# Patient Record
Sex: FEMALE | Race: WHITE | Employment: OTHER | URBAN - METROPOLITAN AREA
[De-identification: names, ages, dates, MRNs, and addresses within clinical notes are randomized per-mention and may not be internally consistent; named-entity substitution may affect disease eponyms.]

---

## 2017-02-28 ENCOUNTER — HOSPITAL ENCOUNTER (EMERGENCY)
Age: 37
Discharge: HOME | End: 2017-02-28
Attending: EMERGENCY MEDICINE | Admitting: EMERGENCY MEDICINE
Payer: MEDICAID

## 2017-02-28 VITALS
BODY MASS INDEX: 32.89 KG/M2 | DIASTOLIC BLOOD PRESSURE: 73 MMHG | HEART RATE: 81 BPM | HEIGHT: 63 IN | SYSTOLIC BLOOD PRESSURE: 104 MMHG | TEMPERATURE: 98.4 F | WEIGHT: 185.63 LBS | OXYGEN SATURATION: 100 % | RESPIRATION RATE: 18 BRPM

## 2017-02-28 DIAGNOSIS — L02.214 ABSCESS OF GROIN, LEFT: Primary | ICD-10-CM

## 2017-02-28 LAB — HCG UR QL: NEGATIVE

## 2017-02-28 PROCEDURE — 74011250637 HC RX REV CODE- 250/637: Performed by: PHYSICIAN ASSISTANT

## 2017-02-28 PROCEDURE — 74011000250 HC RX REV CODE- 250: Performed by: PHYSICIAN ASSISTANT

## 2017-02-28 PROCEDURE — 77030019895 HC PCKNG STRP IODO -A

## 2017-02-28 PROCEDURE — 81025 URINE PREGNANCY TEST: CPT

## 2017-02-28 PROCEDURE — 75810000289 HC I&D ABSCESS SIMP/COMP/MULT

## 2017-02-28 PROCEDURE — 99283 EMERGENCY DEPT VISIT LOW MDM: CPT

## 2017-02-28 RX ORDER — LIDOCAINE HYDROCHLORIDE AND EPINEPHRINE 10; 10 MG/ML; UG/ML
1.5 INJECTION, SOLUTION INFILTRATION; PERINEURAL
Status: COMPLETED | OUTPATIENT
Start: 2017-02-28 | End: 2017-02-28

## 2017-02-28 RX ORDER — CLINDAMYCIN HYDROCHLORIDE 150 MG/1
300 CAPSULE ORAL 4 TIMES DAILY
Qty: 56 CAP | Refills: 0 | Status: SHIPPED | OUTPATIENT
Start: 2017-02-28 | End: 2017-03-07

## 2017-02-28 RX ORDER — IBUPROFEN 800 MG/1
800 TABLET ORAL
Qty: 20 TAB | Refills: 0 | Status: SHIPPED | OUTPATIENT
Start: 2017-02-28 | End: 2017-03-07

## 2017-02-28 RX ORDER — CLINDAMYCIN HYDROCHLORIDE 150 MG/1
300 CAPSULE ORAL
Status: COMPLETED | OUTPATIENT
Start: 2017-02-28 | End: 2017-02-28

## 2017-02-28 RX ORDER — CLINDAMYCIN HYDROCHLORIDE 150 MG/1
300 CAPSULE ORAL 3 TIMES DAILY
Qty: 42 CAP | Refills: 0 | Status: SHIPPED | OUTPATIENT
Start: 2017-02-28 | End: 2017-02-28

## 2017-02-28 RX ADMIN — CLINDAMYCIN HYDROCHLORIDE 300 MG: 150 CAPSULE ORAL at 14:01

## 2017-02-28 RX ADMIN — LIDOCAINE HYDROCHLORIDE,EPINEPHRINE BITARTRATE 15 MG: 10; .01 INJECTION, SOLUTION INFILTRATION; PERINEURAL at 14:02

## 2017-02-28 NOTE — DISCHARGE INSTRUCTIONS
We hope that we have addressed all of your medical concerns. The examination and treatment you received in the Emergency Department were for an emergent problem and were not intended as complete care. It is important that you follow up with your healthcare provider(s) for ongoing care. If your symptoms worsen or do not improve as expected, and you are unable to reach your usual health care provider(s), you should return to the Emergency Department. Today's healthcare is undergoing tremendous change, and patient satisfaction surveys are one of the many tools to assess the quality of medical care. You may receive a survey from the [employer] regarding your experience in the Emergency Department. I hope that your experience has been completely positive, particularly the medical care that I provided. As such, please participate in the survey; anything less than excellent does not meet my expectations or intentions. [facility] and [address] [place] [address] participate in nationally recognized quality of care measures. If your blood pressure is greater than 120/80, as reported below, we urge that you seek medical care to address the potential of high blood pressure, commonly known as hypertension. Hypertension can be hereditary or can be caused by certain medical conditions, pain, stress, or \"white coat syndrome. \"       Please make an appointment with your health care provider(s) for follow up of your Emergency Department visit. VITALS:   Patient Vitals for the past 8 hrs:   Temp Pulse Resp BP SpO2   02/28/17 1252 98.4 °F (36.9 °C) 95 16 115/83 98 %          Thank you for allowing us to provide you with medical care today. We realize that you have many choices for your emergency care needs. Please choose us in the future for any continued health care needs. Yoan Fletcher, [address] [place] [address] [address]   Office: [phone]            No results found for this or any previous visit (from the past 24 hour(s)). No results found. Skin Abscess: Care Instructions  Your Care Instructions    A skin abscess is a bacterial infection that forms a pocket of pus. A boil is a kind of skin abscess. The doctor may have cut an opening in the abscess so that the pus can drain out. You may have gauze in the cut so that the abscess will stay open and keep draining. You may need antibiotics. You will need to follow up with your doctor to make sure the infection has gone away. The doctor has checked you carefully, but problems can develop later. If you notice any problems or new symptoms, get medical treatment right away. Follow-up care is a key part of your treatment and safety. Be sure to make and go to all appointments, and call your doctor if you are having problems. It's also a good idea to know your test results and keep a list of the medicines you take. How can you care for yourself at home? · Apply warm and dry compresses, a heating pad set on low, or a hot water bottle 3 or 4 times a day for pain. Keep a cloth between the heat source and your skin. · If your doctor prescribed antibiotics, take them as directed. Do not stop taking them just because you feel better. You need to take the full course of antibiotics. · Take pain medicines exactly as directed. ¨ If the doctor gave you a prescription medicine for pain, take it as prescribed. ¨ If you are not taking a prescription pain medicine, ask your doctor if you can take an over-the-counter medicine. · Keep your bandage clean and dry. Change the bandage whenever it gets wet or dirty, or at least one time a day. · If the abscess was packed with gauze:  ¨ Keep follow-up appointments to have the gauze changed or removed. If the doctor instructed you to remove the gauze, gently pull out all of the gauze when your doctor tells you to.   ¨ After the gauze is removed, soak the area in warm water for 15 to 20 minutes 2 times a day, until the wound closes. When should you call for help? Call your doctor now or seek immediate medical care if:  · You have signs of worsening infection, such as:  ¨ Increased pain, swelling, warmth, or redness. ¨ Red streaks leading from the infected skin. ¨ Pus draining from the wound. ¨ A fever. Watch closely for changes in your health, and be sure to contact your doctor if:  · You do not get better as expected. Where can you learn more? Go to http://irene-jing.info/. Enter I936 in the search box to learn more about \"Skin Abscess: Care Instructions. \"  Current as of: February 5, 2016  Content Version: 11.1  © 0752-1636 [employer]. Care instructions adapted under license by [employer] (which disclaims liability or warranty for this information). If you have questions about a medical condition or this instruction, always ask your healthcare professional. [address] any warranty or liability for your use of this information.

## 2017-02-28 NOTE — ED TRIAGE NOTES
Pt sent over by PCP for painful cyst on left inner groin. 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

## 2017-02-28 NOTE — ED PROVIDER NOTES
HPI Comments: David Marcial is a 39 y.o. female who presents ambulatory to ER with c/o abscess/cyst to left groin x past few days. Pt reports noticing a bump to left groin several days ago that has since worsened and increased in size and pain. Notes using warm compresses without relief in sx. No drainage/discharge. No fevers, chills. Notes several similar areas in the past that she has had to have opened and drained. No other complaints. She specifically denies any fevers, chills, nausea, vomiting, chest pain, shortness of breath, headache, rash, diarrhea, abdominal pain, urinary/bowel changes, sweating or weight loss. PCP: Shailesh Hayward, MD   PMHx significant for: Past Medical History:  No date: Anxiety  No date: Diabetes ([address]  No date: Fatigue  No date: Headache(784.0)  No date: Ringing in ears  No date: Snoring    PSHx significant for: Past Surgical History:  No date: HX GYN      -- Pcn (Penicillins) -- Unknown (comments)    There are no other complaints, changes or physical findings at this time. The history is provided by the patient. Past Medical History:   Diagnosis Date    Anxiety     Diabetes ([address]     Fatigue     Headache(784.0)     Ringing in ears     Snoring        Past Surgical History:   Procedure Laterality Date    HX GYN           History reviewed. No pertinent family history. Social History     Social History    Marital status: SINGLE     Spouse name: N/A    Number of children: N/A    Years of education: N/A     Occupational History    Not on file. Social History Main Topics    Smoking status: Current Every Day Smoker     Packs/day: 0.50    Smokeless tobacco: Not on file    Alcohol use Yes      Comment: weekends    Drug use: No    Sexual activity: Not on file     Other Topics Concern    Not on file     Social History Narrative         ALLERGIES: Pcn [penicillins]    Review of Systems   Constitutional: Negative. HENT: Negative. Eyes: Negative. Respiratory: Negative. Cardiovascular: Negative. Gastrointestinal: Negative. Genitourinary: Negative. Musculoskeletal: Negative. Skin: Positive for wound (abscess left groin). Neurological: Negative. Hematological: Negative. Psychiatric/Behavioral: Negative. All other systems reviewed and are negative. Vitals:    02/28/17 1252   BP: 115/83   Pulse: 95   Resp: 16   Temp: 98.4 °F (36.9 °C)   SpO2: 98%   Weight: 84.2 kg (185 lb 10 oz)   Height: 5' 3\" (1.6 m)            Physical Exam   Constitutional: She is oriented to person, place, and time. She appears well-developed and well-nourished. No distress. HENT:   Head: Normocephalic and atraumatic. Neck: Normal range of motion. Cardiovascular: Intact distal pulses and normal pulses. Musculoskeletal: Normal range of motion. She exhibits no edema or tenderness. Neurological: She is alert and oriented to person, place, and time. She has normal strength. No sensory deficit. Skin: Skin is warm and dry. No rash noted. She is not diaphoretic. No erythema. No pallor. Psychiatric: She has a normal mood and affect. Her behavior is normal.   Nursing note and vitals reviewed. MDM  Number of Diagnoses or Management Options  Abscess of groin, left:   Diagnosis management comments: DDx: cellulitis, abscess, folliculitis       Amount and/or Complexity of Data Reviewed  Discuss the patient with other providers: yes (Dr. Patsy Zuniga)    Patient Progress  Patient progress: stable    ED Course       Procedures               2:14 PM   Nisha Smith PA-C discussed patient with Harshad Martínez MD who is in agreement with care plan as outlined. No further recommendations. Nisha Smith PA-C      Procedure Note - Incision and Drainage:   2:14 PM  Performed by: Nisha Smith PA-C   Complexity: complex  Skin prepped with safclens. Sterile field established.   Anesthesia achieved with 5 mLs of Lidocaine 1% with epinephrine using a local infiltration. Abscess to left groin was incised with # 11 blade, and small amount of purulent, bloody drainage was expressed. Wound probed and irrigated. Area was packed using 1/4 inch iodoform gauze. Sterile dressing applied. Estimated blood loss: <5ccs  The procedure took 1-15 minutes, and pt tolerated well. 2:38 PM  Pt has been reevaluated. There are no new complaints, changes, or physical findings at this time. Medications have been reviewed w/ pt and/or family. Pt and/or family's questions have been answered. Pt and/or family expressed good understanding of the dx/tx/rx and is in agreement with plan of care. Pt instructed and agreed to f/u w/ PCP and to return to ED upon further deterioration. Pt is ready for discharge. LABORATORY TESTS:  No results found for this or any previous visit (from the past 12 hour(s)). IMAGING RESULTS:  No orders to display     No results found. MEDICATIONS GIVEN:  Medications   clindamycin (CLEOCIN) capsule 300 mg (300 mg Oral Given 2/28/17 1401)   lidocaine-EPINEPHrine (XYLOCAINE) 1 %-1:100,000 injection 15 mg (15 mg IntraDERMal Given 2/28/17 1402)       IMPRESSION:  1. Abscess of groin, left        PLAN:  1. Current Discharge Medication List      START taking these medications    Details   clindamycin (CLEOCIN) 150 mg capsule Take 2 Caps by mouth three (3) times daily for 7 days. Qty: 42 Cap, Refills: 0      ibuprofen (MOTRIN) 800 mg tablet Take 1 Tab by mouth every eight (8) hours as needed for Pain for up to 7 days. Qty: 20 Tab, Refills: 0         CONTINUE these medications which have NOT CHANGED    Details   bacitracin-neomycin-polymyxin b-hydrocortisone (CORTISPORIN) 1 % ointment Apply  to affected area two (2) times a day. topiramate (TOPAMAX) 100 mg tablet Take 1 tablet by mouth nightly. Qty: 30 tablet, Refills: 0    Comments: Follow up appointment required for further refills           2.    Follow-up Information     Follow up With Details Comments Contact Info    Your Primary Care Schedule an appointment as soon as possible for a visit in 2 days For wound re-check and packing removal     [facility] EMERGENCY DEPT Go in 2 days For wound re-check and packing removal Toni Henley [phone] [facility]    [facility] EMERGENCY DEPT  If symptoms worsen 30 [facility]  [phone]            Return to ED if worse

## 2017-03-03 ENCOUNTER — HOSPITAL ENCOUNTER (EMERGENCY)
Age: 37
Discharge: HOME | End: 2017-03-03
Attending: STUDENT IN AN ORGANIZED HEALTH CARE EDUCATION/TRAINING PROGRAM
Payer: MEDICAID

## 2017-03-03 VITALS
HEART RATE: 81 BPM | HEIGHT: 64 IN | OXYGEN SATURATION: 100 % | RESPIRATION RATE: 16 BRPM | SYSTOLIC BLOOD PRESSURE: 129 MMHG | BODY MASS INDEX: 31.58 KG/M2 | WEIGHT: 185 LBS | DIASTOLIC BLOOD PRESSURE: 54 MMHG | TEMPERATURE: 98 F

## 2017-03-03 DIAGNOSIS — Z51.89 VISIT FOR WOUND CHECK: Primary | ICD-10-CM

## 2017-03-03 PROCEDURE — 75810000275 HC EMERGENCY DEPT VISIT NO LEVEL OF CARE

## 2017-03-03 PROCEDURE — 77030011256 HC DRSG MEPILEX <16IN NO BORD MOLN -A

## 2017-03-03 PROCEDURE — 99282 EMERGENCY DEPT VISIT SF MDM: CPT

## 2017-03-03 NOTE — DISCHARGE INSTRUCTIONS
We hope that we have addressed all of your medical concerns. The examination and treatment you received in the Emergency Department were for an emergent problem and were not intended as complete care. It is important that you follow up with your healthcare provider(s) for ongoing care. If your symptoms worsen or do not improve as expected, and you are unable to reach your usual health care provider(s), you should return to the Emergency Department. Today's healthcare is undergoing tremendous change, and patient satisfaction surveys are one of the many tools to assess the quality of medical care. You may receive a survey from the [employer] regarding your experience in the Emergency Department. I hope that your experience has been completely positive, particularly the medical care that I provided. As such, please participate in the survey; anything less than excellent does not meet my expectations or intentions. [facility] and [address] [place] [address] participate in nationally recognized quality of care measures. If your blood pressure is greater than 120/80, as reported below, we urge that you seek medical care to address the potential of high blood pressure, commonly known as hypertension. Hypertension can be hereditary or can be caused by certain medical conditions, pain, stress, or \"white coat syndrome. \"       Please make an appointment with your health care provider(s) for follow up of your Emergency Department visit. VITALS:   Patient Vitals for the past 8 hrs:   Temp Pulse Resp BP SpO2   03/03/17 1122 98 °F (36.7 °C) 81 16 129/54 100 %          Thank you for allowing us to provide you with medical care today. We realize that you have many choices for your emergency care needs. Please choose us in the future for any continued health care needs. Derek Jeffers, [address] [place] [address] [address] Office: [phone]            No results found for this or any previous visit (from the past 24 hour(s)). No results found.

## 2017-03-03 NOTE — ED PROVIDER NOTES
HPI Comments: The pt is a 44year old female who presents for wound evaluation. She was seen in the ED two days prior to abscess, s/p I & D and initiation of clindamycin. She reports compliance with Clindamycin. Pt denies fevers, chills, night sweats, chest pain, pressure, SOB, abdominal pain, n/v/d, melena, hematuria, dysuria, constipation, HA, dizziness, and syncope. Past Medical History:  No date: Anxiety  No date: Diabetes ([address]  No date: Fatigue  No date: Headache(784.0)  No date: Ringing in ears  No date: Snoring    Past Surgical History:  No date: HX GYN          Patient is a 44 y.o. female presenting with wound check. The history is provided by the patient. Wound Check    This is a new problem. Pertinent negatives include no back pain and no neck pain. Past Medical History:   Diagnosis Date    Anxiety     Diabetes ([address]     Fatigue     Headache(784.0)     Ringing in ears     Snoring        Past Surgical History:   Procedure Laterality Date    HX GYN           History reviewed. No pertinent family history. Social History     Social History    Marital status: SINGLE     Spouse name: N/A    Number of children: N/A    Years of education: N/A     Occupational History    Not on file. Social History Main Topics    Smoking status: Current Every Day Smoker     Packs/day: 0.50    Smokeless tobacco: Not on file    Alcohol use Yes      Comment: weekends    Drug use: No    Sexual activity: Not on file     Other Topics Concern    Not on file     Social History Narrative         ALLERGIES: Pcn [penicillins]    Review of Systems   Constitutional: Negative for activity change, appetite change, chills, diaphoresis, fatigue, fever and unexpected weight change. HENT: Negative for congestion, ear pain, rhinorrhea, sinus pressure, sore throat and tinnitus. Eyes: Negative for photophobia, pain, discharge and visual disturbance.    Respiratory: Negative for apnea, cough, choking, chest tightness, shortness of breath, wheezing and stridor. Cardiovascular: Negative for chest pain, palpitations and leg swelling. Gastrointestinal: Negative for abdominal pain, constipation, diarrhea, nausea and vomiting. Endocrine: Negative for polydipsia, polyphagia and polyuria. Genitourinary: Negative for decreased urine volume, dyspareunia, dysuria, enuresis, flank pain, frequency, hematuria and urgency. Musculoskeletal: Negative for arthralgias, back pain, gait problem, myalgias and neck pain. Skin: Positive for wound. Negative for color change, pallor and rash. Allergic/Immunologic: Negative for immunocompromised state. Neurological: Negative for dizziness, seizures, syncope, weakness, light-headedness and headaches. Hematological: Does not bruise/bleed easily. Psychiatric/Behavioral: Negative for agitation and confusion. The patient is not nervous/anxious. Vitals:    03/03/17 1118 03/03/17 1122   BP: 129/46 129/54   Pulse:  81   Resp:  16   Temp:  98 °F (36.7 °C)   SpO2: 99% 100%   Weight:  83.9 kg (185 lb)   Height:  5' 4\" (1.626 m)            Physical Exam   Constitutional: She is oriented to person, place, and time. She appears well-developed and well-nourished. No distress. HENT:   Head: Normocephalic. Right Ear: External ear normal.   Left Ear: External ear normal.   Mouth/Throat: Oropharynx is clear and moist. No oropharyngeal exudate. Eyes: Conjunctivae and EOM are normal. Pupils are equal, round, and reactive to light. Right eye exhibits no discharge. Left eye exhibits no discharge. No scleral icterus. Neck: Normal range of motion. Neck supple. No JVD present. No tracheal deviation present. No thyromegaly present. Cardiovascular: Normal rate, regular rhythm, normal heart sounds and intact distal pulses. Exam reveals no gallop and no friction rub. No murmur heard. Pulmonary/Chest: Effort normal and breath sounds normal. No stridor. No respiratory distress.  She has no wheezes. She has no rales. She exhibits no tenderness. Abdominal: Soft. Bowel sounds are normal. She exhibits no distension and no mass. There is no tenderness. There is no rebound and no guarding. Musculoskeletal: Normal range of motion. She exhibits no edema or tenderness. Lymphadenopathy:     She has no cervical adenopathy. Neurological: She is alert and oriented to person, place, and time. She displays normal reflexes. No cranial nerve deficit. Coordination normal.   Skin: Skin is warm and dry. No rash noted. She is not diaphoretic. No erythema. No pallor. Psychiatric: She has a normal mood and affect. Her behavior is normal. Judgment and thought content normal.   Nursing note and vitals reviewed. MDM  Number of Diagnoses or Management Options  Visit for wound check:   Diagnosis management comments:    * packing removal    Risk of Complications, Morbidity, and/or Mortality  General comments:    - stable, ambulatory pt in NAD    Patient Progress  Patient progress: stable    ED Course       Procedures         12:19 PM  Pt has been reevaluated. There are no new complaints, changes, or physical findings at this time. Medications have been reviewed w/ pt and/or family. Pt and/or family's questions have been answered. Pt and/or family expressed good understanding of the dx/tx/rx and is in agreement with plan of care. Pt instructed and agreed to f/u w/ PCP and to return to ED upon further deterioration. Pt is ready for discharge. LABORATORY TESTS:  No results found for this or any previous visit (from the past 12 hour(s)). IMAGING RESULTS:  No orders to display     No results found. MEDICATIONS GIVEN:  Medications - No data to display    IMPRESSION:  1. Visit for wound check        PLAN:  1.    Discharge Medication List as of 3/3/2017 11:51 AM      CONTINUE these medications which have NOT CHANGED    Details   ibuprofen (MOTRIN) 800 mg tablet Take 1 Tab by mouth every eight (8) hours as needed for Pain for up to 7 days. , Print, Disp-20 Tab, R-0      clindamycin (CLEOCIN) 150 mg capsule Take 2 Caps by mouth four (4) times daily for 7 days. , Print, Disp-56 Cap, R-0           2. Follow-up Information     Follow up With Details Comments Contact Info    Your Primary Care Physician  In 3 days      [facility] EMERGENCY DEPT  As needed, If symptoms worsen [address] [facility]  [phone]        3.  Supportive care     Return to ED if worse         Doni HERBIE Colunga  12:19 PM

## 2017-03-03 NOTE — ED NOTES
Imani Vilchis NP, reviewed discharge instructions with the patient. The patient verbalized understanding.

## 2017-09-22 ENCOUNTER — OFFICE VISIT (OUTPATIENT)
Dept: NEUROLOGY | Age: 37
End: 2017-09-22

## 2017-09-22 ENCOUNTER — TELEPHONE (OUTPATIENT)
Dept: NEUROLOGY | Age: 37
End: 2017-09-22

## 2017-09-22 VITALS
HEART RATE: 89 BPM | RESPIRATION RATE: 16 BRPM | TEMPERATURE: 98.3 F | BODY MASS INDEX: 31.57 KG/M2 | DIASTOLIC BLOOD PRESSURE: 90 MMHG | WEIGHT: 184.9 LBS | HEIGHT: 64 IN | OXYGEN SATURATION: 99 % | SYSTOLIC BLOOD PRESSURE: 130 MMHG

## 2017-09-22 DIAGNOSIS — T39.95XA ANALGESIC OVERUSE HEADACHE: ICD-10-CM

## 2017-09-22 DIAGNOSIS — R41.89 UNRESPONSIVE: Primary | ICD-10-CM

## 2017-09-22 DIAGNOSIS — R40.4 TRANSIENT ALTERATION OF AWARENESS: ICD-10-CM

## 2017-09-22 DIAGNOSIS — G44.40 ANALGESIC OVERUSE HEADACHE: ICD-10-CM

## 2017-09-22 DIAGNOSIS — R40.4 SPELL OF ALTERED CONSCIOUSNESS: ICD-10-CM

## 2017-09-22 DIAGNOSIS — R55 SYNCOPE AND COLLAPSE: ICD-10-CM

## 2017-09-22 RX ORDER — DIAZEPAM 5 MG/1
5 TABLET ORAL
COMMUNITY
Start: 2017-09-06 | End: 2018-05-16

## 2017-09-22 RX ORDER — TRIAMCINOLONE ACETONIDE 5 MG/G
OINTMENT TOPICAL
COMMUNITY
Start: 2017-09-20

## 2017-09-22 RX ORDER — SERTRALINE HYDROCHLORIDE 50 MG/1
50 TABLET, FILM COATED ORAL DAILY
COMMUNITY
Start: 2017-08-28 | End: 2018-02-16

## 2017-09-22 RX ORDER — AZITHROMYCIN 250 MG/1
250 TABLET, FILM COATED ORAL DAILY
COMMUNITY
Start: 2017-09-19 | End: 2017-11-03

## 2017-09-22 NOTE — PROGRESS NOTES
New patient presenting with daily headaches. Have tried imitrex injection and pill and topamax. Reports pain at back of head and temples. Last seen in office 03/2014. Reports blackouts and memory loss for past years. No awareness of what is happening around her when she is having a blackout spells. No testing done.
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
## 2017-09-22 NOTE — TELEPHONE ENCOUNTER
----- Message from Cande Jimenez sent at 9/21/2017  3:39 PM EDT -----  Regarding: Dr. Vanessa Lucia  Pt is calling because the doctors office who has her medical records needs the medical record release form to be sent to them. Fax number [phone].  Pt best contact number [phone]

## 2017-09-22 NOTE — PATIENT INSTRUCTIONS
Information Regarding Testing     If you have physican order for a test or a medication denied by your insurance company, this does not mean the test or medication is not appropriate for you as that is a medical decision, not a decision to be made by an insurance company representative or by an [facility] physician who has not interviewed and examined you. This is a decision to be made between you and your physician. The denial of services is a contractual matter between you and your insurance company, not an issue between your physician and the insurance company. If your test or medication is denied, you can take the following steps to help resolve the issue:    1. File a complaint with the [facility] regarding your insurance company's denial of services ordered for you. You can do this either by calling them directly or by completing an on-line complaint form on the [email or web address]. This can be found at [email or web address]    2. Also file a formal complaint with your insurance company and ask to have the name of the person denying the service so that you may explore a legal option should you be harmed by this denial of service. Again, the fact the insurance company will not pay for the service does not mean it is not medically necessary and I would encourage you to follow through with the plan that was made with your physician    3. File a written complaint with your employer so your employer and benefit manager is aware of the poor coverage they are providing their employees. If you have medicare/medicaid, complain to your representative in the House and to your Bryan Fernandes.     [facility]   Statement to Patients  April 1, 2014      In an effort to ensure the large volume of patient prescription refills is processed in the most efficient and expeditious manner, we are asking our patients to assist us by calling your Pharmacy for all prescription refills, this will include also your  Mail Order Pharmacy. The pharmacy will contact our office electronically to continue the refill process. Please do not wait until the last minute to call your pharmacy. We need at least 48 hours (2days) to fill prescriptions. We also encourage you to call your pharmacy before going to  your prescription to make sure it is ready. With regard to controlled substance prescription refill requests (narcotic refills) that need to be picked up at our office, we ask your cooperation by providing us with at least 72 hours (3days) notice that you will need a refill. We will not refill narcotic prescription refill requests after 4:00pm on any weekday, Monday through Thursday, or after 2:00pm on Fridays, or on the weekends. We encourage everyone to explore another way of getting your prescription refill request processed using [employer], our patient web portal through our electronic medical record system. [employer] is an efficient and effective way to communicate your medication request directly to the office and  downloadable as an noel on your smart phone . [employer] also features a review functionality that allows you to view your medication list as well as leave messages for your physician. Are you ready to get connected? If so please review the attatched instructions or speak to any of our staff to get you set up right away! Thank you so much for your cooperation. Should you have any questions please contact our Practice Administrator. The Physicians and Staff,  [address] [place] [address] [facility]     If we have ordered testing for you, we do not call patients with results and we do not give test results over the phone. We schedule follow up appointments so that your results can be discussed in person and any questions you have regarding them may be addressed.   If something of concern is revealed on your test, we will call you for a sooner follow up appointment. Additionally, results may be found by using the My Chart feature and one of our patient service representatives at the  can give you instructions on how to access this feature of our electronic medical record system    . Learning About Living Efrain  What is a living will? A living will is a legal form you use to write down the kind of care you want at the end of your life. It is used by the health professionals who will treat you if you aren't able to decide for yourself. If you put your wishes in writing, your loved ones and others will know what kind of care you want. They won't need to guess. This can ease your mind and be helpful to others. A living will is not the same as an estate or property will. An estate will explains what you want to happen with your money and property after you die. Is a living will a legal document? A living will is a legal document. Each state has its own laws about living naqvi. If you move to another state, make sure that your living will is legal in the state where you now live. Or you might use a universal form that has been approved by many states. This kind of form can sometimes be completed and stored online. Your electronic copy will then be available wherever you have a connection to the Internet. In most cases, doctors will respect your wishes even if you have a form from a different state. · You don't need an  to complete a living will. But legal advice can be helpful if your state's laws are unclear, your health history is complicated, or your family can't agree on what should be in your living will. · You can change your living will at any time. Some people find that their wishes about end-of-life care change as their health changes. · In addition to making a living will, think about completing a medical power of  form.  This form lets you name the person you want to make end-of-life treatment decisions for you (your \"health care agent\") if you're not able to. Many hospitals and nursing homes will give you the forms you need to complete a living will and a medical power of . · Your living will is used only if you can't make or communicate decisions for yourself anymore. If you become able to make decisions again, you can accept or refuse any treatment, no matter what you wrote in your living will. · Your state may offer an online registry. This is a place where you can store your living will online so the doctors and nurses who need to treat you can find it right away. What should you think about when creating a living will? Talk about your end-of-life wishes with your family members and your doctor. Let them know what you want. That way the people making decisions for you won't be surprised by your choices. Think about these questions as you make your living will:  · Do you know enough about life support methods that might be used? If not, talk to your doctor so you know what might be done if you can't breathe on your own, your heart stops, or you're unable to swallow. · What things would you still want to be able to do after you receive life-support methods? Would you want to be able to walk? To speak? To eat on your own? To live without the help of machines? · If you have a choice, where do you want to be cared for? In your home? At a hospital or nursing home? · Do you want certain Denominational practices performed if you become very ill? · If you have a choice at the end of your life, where would you prefer to die? At home? In a hospital or nursing home? Somewhere else? · Would you prefer to be buried or cremated? · Do you want your organs to be donated after you die? What should you do with your living will? · Make sure that your family members and your health care agent have copies of your living will. · Give your doctor a copy of your living will to keep in your medical record.  If you have more than one doctor, make sure that each one has a copy. · You may want to put a copy of your living will where it can be easily found. Where can you learn more? Go to http://irene-jing.info/. Enter G909 in the search box to learn more about \"Learning About Living [employer]. \"  Current as of: August 8, 2016  Content Version: 11.3  © 8516-7634 [employer]. Care instructions adapted under license by [employer] (which disclaims liability or warranty for this information). If you have questions about a medical condition or this instruction, always ask your healthcare professional. [address] any warranty or liability for your use of this information.

## 2017-09-22 NOTE — MR AVS SNAPSHOT
Visit Information Date & Time Provider Department Dept. Phone Encounter #  
 9/22/2017  2:30 PM Pasquale Angulo MD [facility] [phone] [id number] Follow-up Instructions Return for After Tests. Upcoming Health Maintenance Date Due Pneumococcal 19-64 Medium Risk (1 of 1 - PPSV23) 3/3/1998 DTaP/Tdap/Td series (1 - Tdap) 3/3/2000 PAP AKA CERVICAL CYTOLOGY 3/3/2000 INFLUENZA AGE 9 TO ADULT 8/1/2017 Allergies as of 9/22/2017  Review Complete On: 9/22/2017 By: Pasquale Angulo MD  
  
 Severity Noted Reaction Type Reactions Pcn [Penicillins]  03/28/2014    Unknown (comments) Current Immunizations  Never Reviewed No immunizations on file. Not reviewed this visit You Were Diagnosed With   
  
 Codes Comments Unresponsive    -  Primary ICD-10-CM: R41.89 ICD-9-CM: 780.09 Spell of altered consciousness     ICD-10-CM: R40.4 ICD-9-CM: 780.09 Transient alteration of awareness     ICD-10-CM: R40.4 ICD-9-CM: 780.02 Analgesic overuse headache     ICD-10-CM: T39.91XA, G44.40 ICD-9-CM: 339.3, E935.9 Vitals BP Pulse Temp Resp Height(growth percentile) Weight(growth percentile) 130/90 89 98.3 °F (36.8 °C) 16 5' 4\" (1.626 m) 184 lb 14.4 oz (83.9 kg) SpO2 BMI OB Status Smoking Status 99% 31.74 kg/m2 IUD Current Every Day Smoker Vitals History BMI and BSA Data Body Mass Index Body Surface Area 31.74 kg/m 2 1.95 m 2 Preferred Pharmacy Pharmacy Name Phone [facility] [address] [place], [address] Your Updated Medication List  
  
   
This list is accurate as of: 9/22/17  2:45 PM.  Always use your most recent med list.  
  
  
  
  
 azithromycin 250 mg tablet Commonly known as:  ZITHROMAX  
250 mg daily. diazePAM 5 mg tablet Commonly known as:  VALIUM  
5 mg three (3) times daily as needed. sertraline 50 mg tablet Commonly known as:  ZOLOFT  
100 mg daily. triamcinolone acetonide 0.5 % ointment Commonly known as:  KENALOG Follow-up Instructions Return for After Tests. To-Do List   
 09/22/2017 Imaging:  DUPLEX CAROTID BILATERAL AMB NEURO   
  
 09/22/2017 Neurology:  EEG   
  
 09/22/2017 Imaging:  MRI BRAIN W WO CONT Patient Instructions Information Regarding Testing If you have physican order for a test or a medication denied by your insurance company, this does not mean the test or medication is not appropriate for you as that is a medical decision, not a decision to be made by an insurance company representative or by an [facility] physician who has not interviewed and examined you. This is a decision to be made between you and your physician. The denial of services is a contractual matter between you and your insurance company, not an issue between your physician and the insurance company. If your test or medication is denied, you can take the following steps to help resolve the issue: 1. File a complaint with the [facility] regarding your insurance company's denial of services ordered for you. You can do this either by calling them directly or by completing an on-line complaint form on the [employer]. This can be found at www.[place].[employer] Records & Resources 2. Also file a formal complaint with your insurance company and ask to have the name of the person denying the service so that you may explore a legal option should you be harmed by this denial of service. Again, the fact the insurance company will not pay for the service does not mean it is not medically necessary and I would encourage you to follow through with the plan that was made with your physician 3.    File a written complaint with your employer so your employer and benefit manager is aware of the poor coverage they are providing their employees. If you have medicare/medicaid, complain to your representative in the House and to your Bryan Fernandes. PRESCRIPTION REFILL POLICY [facility] Statement to Patients April 1, 2014 In an effort to ensure the large volume of patient prescription refills is processed in the most efficient and expeditious manner, we are asking our patients to assist us by calling your Pharmacy for all prescription refills, this will include also your  Mail Order Pharmacy. The pharmacy will contact our office electronically to continue the refill process. Please do not wait until the last minute to call your pharmacy. We need at least 48 hours (2days) to fill prescriptions. We also encourage you to call your pharmacy before going to  your prescription to make sure it is ready. With regard to controlled substance prescription refill requests (narcotic refills) that need to be picked up at our office, we ask your cooperation by providing us with at least 72 hours (3days) notice that you will need a refill. We will not refill narcotic prescription refill requests after 4:00pm on any weekday, Monday through Thursday, or after 2:00pm on Fridays, or on the weekends. We encourage everyone to explore another way of getting your prescription refill request processed using [employer], our patient web portal through our electronic medical record system. [employer] is an efficient and effective way to communicate your medication request directly to the office and  downloadable as an noel on your smart phone . [employer] also features a review functionality that allows you to view your medication list as well as leave messages for your physician. Are you ready to get connected? If so please review the attatched instructions or speak to any of our staff to get you set up right away! Thank you so much for your cooperation. Should you have any questions please contact our Practice Administrator. The Physicians and Staff,  [facility] If we have ordered testing for you, we do not call patients with results and we do not give test results over the phone. We schedule follow up appointments so that your results can be discussed in person and any questions you have regarding them may be addressed. If something of concern is revealed on your test, we will call you for a sooner follow up appointment. Additionally, results may be found by using the My Chart feature and one of our patient service representatives at the  can give you instructions on how to access this feature of our electronic medical record system Starla Chaudhary Watson [id number] What is a living will? A living will is a legal form you use to write down the kind of care you want at the end of your life. It is used by the health professionals who will treat you if you aren't able to decide for yourself. If you put your wishes in writing, your loved ones and others will know what kind of care you want. They won't need to guess. This can ease your mind and be helpful to others. A living will is not the same as an estate or property will. An estate will explains what you want to happen with your money and property after you die. Is a living will a legal document? A living will is a legal document. Each state has its own laws about living naqvi. If you move to another state, make sure that your living will is legal in the state where you now live. Or you might use a universal form that has been approved by many states. This kind of form can sometimes be completed and stored online. Your electronic copy will then be available wherever you have a connection to the Internet. In most cases, doctors will respect your wishes even if you have a form from a different state. · You don't need an  to complete a living will.  But legal advice can be helpful if your state's laws are unclear, your health history is complicated, or your family can't agree on what should be in your living will. · You can change your living will at any time. Some people find that their wishes about end-of-life care change as their health changes. · In addition to making a living will, think about completing a medical power of  form. This form lets you name the person you want to make end-of-life treatment decisions for you (your \"health care agent\") if you're not able to. Many hospitals and nursing homes will give you the forms you need to complete a living will and a medical power of . · Your living will is used only if you can't make or communicate decisions for yourself anymore. If you become able to make decisions again, you can accept or refuse any treatment, no matter what you wrote in your living will. · Your state may offer an online registry. This is a place where you can store your living will online so the doctors and nurses who need to treat you can find it right away. What should you think about when creating a living will? Talk about your end-of-life wishes with your family members and your doctor. Let them know what you want. That way the people making decisions for you won't be surprised by your choices. Think about these questions as you make your living will: · Do you know enough about life support methods that might be used? If not, talk to your doctor so you know what might be done if you can't breathe on your own, your heart stops, or you're unable to swallow. · What things would you still want to be able to do after you receive life-support methods? Would you want to be able to walk? To speak? To eat on your own? To live without the help of machines? · If you have a choice, where do you want to be cared for? In your home? At a hospital or nursing home? · Do you want certain Spiritism practices performed if you become very ill? · If you have a choice at the end of your life, where would you prefer to die? At home? In a hospital or nursing home? Somewhere else? · Would you prefer to be buried or cremated? · Do you want your organs to be donated after you die? What should you do with your living will? · Make sure that your family members and your health care agent have copies of your living will. · Give your doctor a copy of your living will to keep in your medical record. If you have more than one doctor, make sure that each one has a copy. · You may want to put a copy of your living will where it can be easily found. Where can you learn more? Go to http://irene-jing.info/. Enter M598 in the search box to learn more about \"Learning About Living Cornel Blanca. \" Current as of: August 8, 2016 Content Version: 11.3 © 0824-3816 [employer]. Care instructions adapted under license by [employer] (which disclaims liability or warranty for this information). If you have questions about a medical condition or this instruction, always ask your healthcare professional. [address] any warranty or liability for your use of this information. Introducing [facility]! Dear Grace Mckenzie: 
Thank you for requesting a Fresh ! account. Our records indicate that you already have an active Fresh ! account. You can access your account anytime at [email or web address] Did you know that you can access your hospital and ER discharge instructions at any time in Fresh !? You can also review all of your test results from your hospital stay or ER visit. Additional Information If you have questions, please visit the Frequently Asked Questions section of the Fresh ! website at [email or web address] Remember, Fresh ! is NOT to be used for urgent needs. For medical emergencies, dial 911. Now available from your iPhone and Android! Please provide this summary of care documentation to your next provider. Your primary care clinician is listed as \[facility] [place] [facility]\"" Host. If you have any questions after today's visit, please call [phone].

## 2017-09-25 NOTE — PROCEDURES
Carotid Doppler:     Date:  09/22/17    Requesting Physician:  Alem Madrigal. MD [place]     Indication:  Syncope and alteration in consciousness. .     B-mode imaging reveals no significant plaque throughout the carotid systems bilaterally. Doppler spectral analysis reveals no elevated velocities. Vertebral artery flow antegrade bilaterally. Interpretation:  Normal study.

## 2017-09-28 ENCOUNTER — TELEPHONE (OUTPATIENT)
Dept: NEUROLOGY | Age: 37
End: 2017-09-28

## 2017-09-28 NOTE — TELEPHONE ENCOUNTER
----- Message from Lavinia Kehr sent at 9/28/2017  3:11 PM EDT -----  Regarding: Dr. Irina Quick  Pt is scheduled for an EEG and MRI tomorrow. Pt is extremely claustrophobic. Pt would like to know if she can take diazepam or take something else that will not interfere with the MRI. Pt has  that will bring her to the appt. Pt can be reached at  [phone].

## 2017-09-29 ENCOUNTER — HOSPITAL ENCOUNTER (OUTPATIENT)
Dept: MRI IMAGING | Age: 37
Discharge: HOME | End: 2017-09-29
Attending: PSYCHIATRY & NEUROLOGY
Payer: MEDICAID

## 2017-09-29 ENCOUNTER — HOSPITAL ENCOUNTER (OUTPATIENT)
Dept: NEUROLOGY | Age: 37
Discharge: HOME | End: 2017-09-29
Attending: PSYCHIATRY & NEUROLOGY
Payer: MEDICAID

## 2017-09-29 ENCOUNTER — HOSPITAL ENCOUNTER (EMERGENCY)
Age: 37
Discharge: STILL ADMITTED OR ADMINISTRATIVE | End: 2017-09-29
Attending: EMERGENCY MEDICINE
Payer: MEDICAID

## 2017-09-29 VITALS — WEIGHT: 184 LBS | BODY MASS INDEX: 31.58 KG/M2

## 2017-09-29 DIAGNOSIS — R40.4 TRANSIENT ALTERATION OF AWARENESS: ICD-10-CM

## 2017-09-29 DIAGNOSIS — R41.89 UNRESPONSIVE: ICD-10-CM

## 2017-09-29 DIAGNOSIS — T39.95XA ANALGESIC OVERUSE HEADACHE: ICD-10-CM

## 2017-09-29 DIAGNOSIS — R40.4 SPELL OF ALTERED CONSCIOUSNESS: ICD-10-CM

## 2017-09-29 DIAGNOSIS — G44.40 ANALGESIC OVERUSE HEADACHE: ICD-10-CM

## 2017-09-29 LAB — CREAT BLD-MCNC: 0.9 MG/DL (ref 0.6–1.3)

## 2017-09-29 PROCEDURE — 82565 ASSAY OF CREATININE: CPT

## 2017-09-29 PROCEDURE — 95816 EEG AWAKE AND DROWSY: CPT

## 2017-09-29 PROCEDURE — 75810000275 HC EMERGENCY DEPT VISIT NO LEVEL OF CARE

## 2017-09-29 PROCEDURE — A9576 INJ PROHANCE MULTIPACK: HCPCS | Performed by: RADIOLOGY

## 2017-09-29 PROCEDURE — 70553 MRI BRAIN STEM W/O & W/DYE: CPT

## 2017-09-29 PROCEDURE — 74011250636 HC RX REV CODE- 250/636: Performed by: RADIOLOGY

## 2017-09-29 RX ADMIN — GADOTERIDOL 15 ML: 279.3 INJECTION, SOLUTION INTRAVENOUS at 16:07

## 2017-10-03 NOTE — PROCEDURES
[facility]   [address] [facility], [address]   ROUTINE EEG REPORT       Name:  Juan Ospina   MR#:  [id number]   :  1980   Account #:  [de-identified]    Date of Procedure:  2017   Date of Adm:  2017       REQUESTING PHYSICIAN: Deniz Ruelas MD    INDICATIONS: An EEG is requested in this 26-year-old woman with   spells of staring, as well as falling out of bed and episodes suspicious   for seizure to evaluate for epileptiform abnormalities. MEDICATIONS   Her medications are said to include:   1. Valium. 2. Zoloft. 3. Kenalog. FINDINGS: This tracing is obtained during the awake state. During   wakefulness, the background consists of diffuse low-voltage fast   frequency beta wave activity. Hyperventilation is performed for 3 minutes and little alters the tracing. During hyperventilation, the technologist notes the patient is reporting   seeing colorful floaters. There is no change to the EEG background. Intermittent photic stimulation induces symmetric posterior driving   responses. Sleep is not attained. INTERPRETATION: This EEG recorded during the awake state is   normal. No epileptiform abnormalities are seen; however, this does not   exclude the possibility of seizures and/or epilepsy.  Should seizures   remain a clinical concern, a repeat tracing following sleep deprivation   may add additional sensitivity to the test.        Shelby Tabares MD      SLE / MP   D:  10/02/2017   16:54   T:  10/03/2017   01:24   Job #:  [id number]

## 2017-10-16 ENCOUNTER — OFFICE VISIT (OUTPATIENT)
Dept: NEUROLOGY | Age: 37
End: 2017-10-16

## 2017-10-16 VITALS
HEIGHT: 64 IN | WEIGHT: 190 LBS | SYSTOLIC BLOOD PRESSURE: 118 MMHG | BODY MASS INDEX: 32.44 KG/M2 | DIASTOLIC BLOOD PRESSURE: 82 MMHG

## 2017-10-16 DIAGNOSIS — R93.89 ABNORMAL MRI: ICD-10-CM

## 2017-10-16 DIAGNOSIS — R40.4 SPELL OF ALTERED CONSCIOUSNESS: Primary | ICD-10-CM

## 2017-10-16 DIAGNOSIS — G43.919 INTRACTABLE MIGRAINE WITHOUT STATUS MIGRAINOSUS, UNSPECIFIED MIGRAINE TYPE: ICD-10-CM

## 2017-10-16 DIAGNOSIS — H53.9 VISUAL DISTURBANCE: ICD-10-CM

## 2017-10-16 RX ORDER — SUMATRIPTAN 100 MG/1
TABLET, FILM COATED ORAL
Qty: 12 TAB | Refills: 5 | Status: SHIPPED | OUTPATIENT
Start: 2017-10-16

## 2017-10-16 RX ORDER — TOPIRAMATE 25 MG/1
TABLET ORAL
Qty: 42 TAB | Refills: 0 | Status: SHIPPED | OUTPATIENT
Start: 2017-10-16 | End: 2017-11-15

## 2017-10-16 RX ORDER — TOPIRAMATE 100 MG/1
TABLET, FILM COATED ORAL
Qty: 30 TAB | Refills: 5 | Status: SHIPPED | OUTPATIENT
Start: 2017-10-16 | End: 2017-11-15 | Stop reason: SDUPTHER

## 2017-10-16 NOTE — PROGRESS NOTES
Ruddy Lindo is a 44 y.o. female who presents with the following  Chief Complaint   Patient presents with    Follow-up       HPI Patient comes in with  for a follow up for test results. She is still having multiple issues. She has noticed since last visit she has wet herself multiple times over night. Is waking up having bite her tongue and side of her mouth. Has even woken up on the floor of the room a few times. She has woken up very stiff and sore out of bed also. No LOC but just feels slow, confused. She is noticing that she is getting about 20 migrianes a month lasting all day everyday. She states they are in the entire head. Get dizziness, visual disturbance where she will see flashing lights, nausea, light sound smell sensitive. She notices that her vision is distorted a lot of the times and this comes for hours upon end. She has tried Topamax in the past with imitrex injections and pills and these worked well to decrease her headaches. imitrex and zembrace both worked well for her abortive therapy and quick. She is having some numbness and tingling in her extremities also. She has not had any trouble during the day. Allergies   Allergen Reactions    Pcn [Penicillins] Unknown (comments)       Current Outpatient Prescriptions   Medication Sig    SUMAtriptan (IMITREX) 100 mg tablet 1 at HA onset and repeat in 2 hours if needed. Max 2 in 24 hours    topiramate (TOPAMAX) 100 mg tablet Take 1 tablet by mouth nightly    topiramate (TOPAMAX) 25 mg tablet 25 mg nightly for 1 week then, 50 mg nightly for 1 week then, 75 mg nightly for 1 week then, 100mg nightly thereafter    SUMAtriptan succinate (ZEMBRACE SYMTOUCH) 3 mg/0.5 mL pnij 1 Syringe by SubCUTAneous route as needed. At headache onset. May repeat every hour X 4 doses. Max 4 doses in 24 hours.  sertraline (ZOLOFT) 50 mg tablet 100 mg daily.  diazePAM (VALIUM) 5 mg tablet 5 mg three (3) times daily as needed.     triamcinolone acetonide (KENALOG) 0.5 % ointment     azithromycin (ZITHROMAX) 250 mg tablet 250 mg daily. No current facility-administered medications for this visit. History   Smoking Status    Current Every Day Smoker    Packs/day: 0.50   Smokeless Tobacco    Never Used       Past Medical History:   Diagnosis Date    Anxiety     Anxiety     Chest pain     Depression     Diabetes (HCC)     Fatigue     Headache     Headache(784.0)     Memory loss     Neuropathy     PTSD (post-traumatic stress disorder)     Rash     Ringing in ears     Ringing in ears     Skipped beats     Snoring     Visual disturbance        Past Surgical History:   Procedure Laterality Date    HX GYN         Family History   Problem Relation Age of Onset    Headache Mother     Neuropathy Mother     Cancer Father     Seizures Other     Heart Disease Other     Stroke Other        Social History     Social History    Marital status: SINGLE     Spouse name: N/A    Number of children: N/A    Years of education: N/A     Social History Main Topics    Smoking status: Current Every Day Smoker     Packs/day: 0.50    Smokeless tobacco: Never Used    Alcohol use Yes      Comment: weekends    Drug use: No    Sexual activity: Not Asked     Other Topics Concern    None     Social History Narrative       Review of Systems   HENT: Negative for ear pain, hearing loss and tinnitus. Eyes: Positive for blurred vision, double vision and photophobia. Respiratory: Negative for shortness of breath and wheezing. Cardiovascular: Negative for chest pain and palpitations. Gastrointestinal: Negative for nausea and vomiting. Neurological: Positive for tingling and headaches. Negative for dizziness, seizures, loss of consciousness and weakness. Remainder of comprehensive review is negative.      Physical Exam :    Visit Vitals    /82    Ht 5' 4\" (1.626 m)    Wt 86.2 kg (190 lb)    BMI 32.61 kg/m2               Results for orders placed or performed during the hospital encounter of 17   POC CREATININE   Result Value Ref Range    Creatinine (POC) 0.9 0.6 - 1.3 MG/DL    GFRAA, POC >60 >60 ml/min/1.73m2    GFRNA, POC >60 >60 ml/min/1.73m2       Orders Placed This Encounter    MRI CERV SPINE W WO CONT     Standing Status:   Future     Standing Expiration Date:   2018     Order Specific Question:   Is Patient Allergic to Contrast Dye? Answer:   No     Order Specific Question:   Is Patient Pregnant? Answer:   No     Order Specific Question:   Stat POC Creatinine as needed for Radiology Policy     Answer: Yes    REFERRAL TO NEUROLOGY     Referral Priority:   Routine     Referral Type:   Consultation     Referral Reason:   Specialty Services Required     Referred to Provider:   Michael Fitzgerald MD    NEURO EEG 24 HR      Standing Status:   Future     Standing Expiration Date:   2018     Order Specific Question:   Reason for Exam:     Answer:   seizures, ALA, incontinence    SUMAtriptan (IMITREX) 100 mg tablet     Si at HA onset and repeat in 2 hours if needed. Max 2 in 24 hours     Dispense:  12 Tab     Refill:  5    topiramate (TOPAMAX) 100 mg tablet     Sig: Take 1 tablet by mouth nightly     Dispense:  30 Tab     Refill:  5    topiramate (TOPAMAX) 25 mg tablet     Si mg nightly for 1 week then, 50 mg nightly for 1 week then, 75 mg nightly for 1 week then, 100mg nightly thereafter     Dispense:  42 Tab     Refill:  0    SUMAtriptan succinate (ZEMBRACE SYMTOUCH) 3 mg/0.5 mL pnij     Si Syringe by SubCUTAneous route as needed. At headache onset. May repeat every hour X 4 doses. Max 4 doses in 24 hours. Dispense:  1 Box     Refill:  5       1. Spell of altered consciousness    2. Visual disturbance    3. Abnormal MRI    4. Intractable migraine without status migrainosus, unspecified migraine type        Follow-up Disposition:  Return after test.    Multiple complaints.  EEG was normal. She is having her symptoms overnight and they seem to be epileptic from what she is saying so we need a 24 hour EEG to look at this further. We also need to get an MRI of the cervical spine to look specifically at lesions indicative of MS as her brain MRI showed abnormalities concerning for age. We also discussed a VEP to look at the optic nerve to look for lesions. We will restart Topamax to 100 mg nightly for headache prevention. Also reinstate imitrex and zembrace depending on the severity of symptoms and pain she is having. We discussed her POC and medications and side effects and she has no questions at this time.            This note will not be viewable in [place]

## 2017-10-16 NOTE — MR AVS SNAPSHOT
Visit Information Date & Time Provider Department Dept. Phone Encounter #  
 10/16/2017  1:00 PM HERBIE [facility] [phone] [id number] Follow-up Instructions Return after test. Upcoming Health Maintenance Date Due Pneumococcal 19-64 Medium Risk (1 of 1 - PPSV23) 3/3/1998 DTaP/Tdap/Td series (1 - Tdap) 3/3/2000 PAP AKA CERVICAL CYTOLOGY 3/3/2000 INFLUENZA AGE 9 TO ADULT 8/1/2017 Allergies as of 10/16/2017  Review Complete On: 10/16/2017 By: Zenaida Lugo Severity Noted Reaction Type Reactions Pcn [Penicillins]  03/28/2014    Unknown (comments) Current Immunizations  Never Reviewed No immunizations on file. Not reviewed this visit You Were Diagnosed With   
  
 Codes Comments Spell of altered consciousness    -  Primary ICD-10-CM: R40.4 ICD-9-CM: 780.09 Visual disturbance     ICD-10-CM: H53.9 ICD-9-CM: 368. 9 Abnormal MRI     ICD-10-CM: R93.8 ICD-9-CM: 793.99 Intractable migraine without status migrainosus, unspecified migraine type     ICD-10-CM: G43.919 ICD-9-CM: 346.91 Vitals BP Height(growth percentile) Weight(growth percentile) BMI OB Status Smoking Status 118/82 5' 4\" (1.626 m) 190 lb (86.2 kg) 32.61 kg/m2 IUD Current Every Day Smoker Vitals History BMI and BSA Data Body Mass Index Body Surface Area  
 32.61 kg/m 2 1.97 m 2 Preferred Pharmacy Pharmacy Name Phone [facility] [address] [phone] Your Updated Medication List  
  
   
This list is accurate as of: 10/16/17  1:40 PM.  Always use your most recent med list.  
  
  
  
  
 azithromycin 250 mg tablet Commonly known as:  ZITHROMAX  
250 mg daily. diazePAM 5 mg tablet Commonly known as:  VALIUM  
5 mg three (3) times daily as needed. sertraline 50 mg tablet Commonly known as:  ZOLOFT  
100 mg daily. * SUMAtriptan 100 mg tablet Commonly known as:  IMITREX  
1 at HA onset and repeat in 2 hours if needed. Max 2 in 24 hours * SUMAtriptan succinate 3 mg/0.5 mL Pnij Commonly known as:  [place]  
1 Syringe by SubCUTAneous route as needed. At headache onset. May repeat every hour X 4 doses. Max 4 doses in 24 hours. * topiramate 100 mg tablet Commonly known as:  TOPAMAX Take 1 tablet by mouth nightly * topiramate 25 mg tablet Commonly known as:  TOPAMAX 25 mg nightly for 1 week then, 50 mg nightly for 1 week then, 75 mg nightly for 1 week then, 100mg nightly thereafter  
  
 triamcinolone acetonide 0.5 % ointment Commonly known as:  KENALOG * Notice: This list has 4 medication(s) that are the same as other medications prescribed for you. Read the directions carefully, and ask your doctor or other care provider to review them with you. Prescriptions Sent to Pharmacy Refills SUMAtriptan (IMITREX) 100 mg tablet 5 Si at HA onset and repeat in 2 hours if needed. Max 2 in 24 hours Class: Normal  
 Pharmacy: [address] Ph #: [phone]  
 topiramate (TOPAMAX) 100 mg tablet 5 Sig: Take 1 tablet by mouth nightly Class: Normal  
 Pharmacy: [address] Ph #: [phone]  
 topiramate (TOPAMAX) 25 mg tablet 0 Si mg nightly for 1 week then, 50 mg nightly for 1 week then, 75 mg nightly for 1 week then, 100mg nightly thereafter Class: Normal  
 Pharmacy: [address] Ph #: [phone] SUMAtriptan succinate (ZEMBRACE SYMTOUCH) 3 mg/0.5 mL pnij 5 Si Syringe by SubCUTAneous route as needed. At headache onset. May repeat every hour X 4 doses. Max 4 doses in 24 hours.   
 Class: Normal  
 Pharmacy: [place] [address] [facility] CLAUDETTE  #: [phone] Route: SubCUTAneous We Performed the Following REFERRAL TO NEUROLOGY [JUI56 Custom] Comments: VEP Follow-up Instructions Return after test.  
  
To-Do List   
 10/16/2017 Imaging:  MRI CERV SPINE W WO CONT   
  
 10/16/2017 Neurology:  NEURO EEG 24 HR Referral Information Referral ID Referred By [facility]  
  
 [id number] Status Start Date End Date 1 New Request 10/16/17 10/16/18 If your referral has a status of pending review or denied, additional information will be sent to support the outcome of this decision. Referral ID Referred By Referred To  
 [id number] [facility] Not Available Visits Status Start Date End Date 1 New Request 10/16/17 10/16/18 If your referral has a status of pending review or denied, additional information will be sent to support the outcome of this decision. Referral ID Referred By Referred To  
 [id number] Lauren, [address] [place] [address], MD Fernandez [address] [place] [address] [address] Phone: [phone] Fax: [phone] Visits Status Start Date End Date 1 New Request 10/16/17 10/16/18 If your referral has a status of pending review or denied, additional information will be sent to support the outcome of this decision. Patient Instructions PRESCRIPTION REFILL POLICY [facility] Statement to Patients April 1, 2014 In an effort to ensure the large volume of patient prescription refills is processed in the most efficient and expeditious manner, we are asking our patients to assist us by calling your Pharmacy for all prescription refills, this will include also your  Mail Order Pharmacy. The pharmacy will contact our office electronically to continue the refill process. Please do not wait until the last minute to call your pharmacy. We need at least 48 hours (2days) to fill prescriptions. We also encourage you to call your pharmacy before going to  your prescription to make sure it is ready. With regard to controlled substance prescription refill requests (narcotic refills) that need to be picked up at our office, we ask your cooperation by providing us with at least 72 hours (3days) notice that you will need a refill. We will not refill narcotic prescription refill requests after 4:00pm on any weekday, Monday through Thursday, or after 2:00pm on Fridays, or on the weekends. We encourage everyone to explore another way of getting your prescription refill request processed using GreatCall, our patient web portal through our electronic medical record system. GreatCall is an efficient and effective way to communicate your medication request directly to the office and  downloadable as an noel on your smart phone . GreatCall also features a review functionality that allows you to view your medication list as well as leave messages for your physician. Are you ready to get connected? If so please review the attatched instructions or speak to any of our staff to get you set up right away! Thank you so much for your cooperation. Should you have any questions please contact our Practice Administrator. The Physicians and Staff,  [facility] Eddie Watson [address] What is a living will? A living will is a legal form you use to write down the kind of care you want at the end of your life. It is used by the health professionals who will treat you if you aren't able to decide for yourself. If you put your wishes in writing, your loved ones and others will know what kind of care you want. They won't need to guess. This can ease your mind and be helpful to others. A living will is not the same as an estate or property will.  An estate will explains what you want to happen with your money and property after you die. Is a living will a legal document? A living will is a legal document. Each state has its own laws about living naqvi. If you move to another state, make sure that your living will is legal in the state where you now live. Or you might use a universal form that has been approved by many states. This kind of form can sometimes be completed and stored online. Your electronic copy will then be available wherever you have a connection to the Internet. In most cases, doctors will respect your wishes even if you have a form from a different state. · You don't need an  to complete a living will. But legal advice can be helpful if your state's laws are unclear, your health history is complicated, or your family can't agree on what should be in your living will. · You can change your living will at any time. Some people find that their wishes about end-of-life care change as their health changes. · In addition to making a living will, think about completing a medical power of  form. This form lets you name the person you want to make end-of-life treatment decisions for you (your \"health care agent\") if you're not able to. Many hospitals and nursing homes will give you the forms you need to complete a living will and a medical power of . · Your living will is used only if you can't make or communicate decisions for yourself anymore. If you become able to make decisions again, you can accept or refuse any treatment, no matter what you wrote in your living will. · Your state may offer an online registry. This is a place where you can store your living will online so the doctors and nurses who need to treat you can find it right away. What should you think about when creating a living will?  
Talk about your end-of-life wishes with your family members and your doctor. Let them know what you want. That way the people making decisions for you won't be surprised by your choices. Think about these questions as you make your living will: · Do you know enough about life support methods that might be used? If not, talk to your doctor so you know what might be done if you can't breathe on your own, your heart stops, or you're unable to swallow. · What things would you still want to be able to do after you receive life-support methods? Would you want to be able to walk? To speak? To eat on your own? To live without the help of machines? · If you have a choice, where do you want to be cared for? In your home? At a hospital or nursing home? · Do you want certain Islam practices performed if you become very ill? · If you have a choice at the end of your life, where would you prefer to die? At home? In a hospital or nursing home? Somewhere else? · Would you prefer to be buried or cremated? · Do you want your organs to be donated after you die? What should you do with your living will? · Make sure that your family members and your health care agent have copies of your living will. · Give your doctor a copy of your living will to keep in your medical record. If you have more than one doctor, make sure that each one has a copy. · You may want to put a copy of your living will where it can be easily found. Where can you learn more? Go to http://irene-jing.info/. Enter X643 in the search box to learn more about \"Learning About Living Efrain. \" Current as of: August 8, 2016 Content Version: 11.3 © 7115-0361 [employer]. Care instructions adapted under license by [employer] (which disclaims liability or warranty for this information).  If you have questions about a medical condition or this instruction, always ask your healthcare professional. [address]  any warranty or liability for your use of this information. Advance Directives: Care Instructions Your Care Instructions An advance directive is a legal way to state your wishes at the end of your life. It tells your family and your doctor what to do if you can no longer say what you want. There are two main types of advance directives. You can change them any time that your wishes change. · A living will tells your family and your doctor your wishes about life support and other treatment. · A durable power of  for health care lets you name a person to make treatment decisions for you when you can't speak for yourself. This person is called a health care agent. If you do not have an advance directive, decisions about your medical care may be made by a doctor or a  who doesn't know you. It may help to think of an advance directive as a gift to the people who care for you. If you have one, they won't have to make tough decisions by themselves. Follow-up care is a key part of your treatment and safety. Be sure to make and go to all appointments, and call your doctor if you are having problems. It's also a good idea to know your test results and keep a list of the medicines you take. How can you care for yourself at home? · Discuss your wishes with your loved ones and your doctor. This way, there are no surprises. · Many states have a unique form. Or you might use a universal form that has been approved by many states. This kind of form can sometimes be completed and stored online. Your electronic copy will then be available wherever you have a connection to the Internet. In most cases, doctors will respect your wishes even if you have a form from a different state. · You don't need a  to do an advance directive. But you may want to get legal advice. · Think about these questions when you prepare an advance directive: ¨ Who do you want to make decisions about your medical care if you are not able to? Many people choose a family member or close friend. ¨ Do you know enough about life support methods that might be used? If not, talk to your doctor so you understand. ¨ What are you most afraid of that might happen? You might be afraid of having pain, losing your independence, or being kept alive by machines. ¨ Where would you prefer to die? Choices include your home, a hospital, or a nursing home. ¨ Would you like to have information about hospice care to support you and your family? ¨ Do you want to donate organs when you die? ¨ Do you want certain Christian practices performed before you die? If so, put your wishes in the advance directive. · Read your advance directive every year, and make changes as needed. When should you call for help? Be sure to contact your doctor if you have any questions. Where can you learn more? Go to http://irene-jing.info/. Enter R264 in the search box to learn more about \"Advance Directives: Care Instructions. \" Current as of: November 17, 2016 Content Version: 11.3 © 5664-1920 [employer]. Care instructions adapted under license by [employer] (which disclaims liability or warranty for this information). If you have questions about a medical condition or this instruction, always ask your healthcare professional. [address] any warranty or liability for your use of this information. Introducing [facility]! Dear Lurlean Runner: 
Thank you for requesting a Playdom account. Our records indicate that you already have an active Playdom account. You can access your account anytime at [email or web address]. [facility]/UNITY Mobile Did you know that you can access your hospital and ER discharge instructions at any time in Playdom? You can also review all of your test results from your hospital stay or ER visit. Additional Information If you have questions, please visit the Frequently Asked Questions section of the [employer] website at [email or web address] Remember, [facility] is NOT to be used for urgent needs. For medical emergencies, dial 911. Now available from your iPhone and Android! Please provide this summary of care documentation to your next provider. Your primary care clinician is listed as Brandee Means. If you have any questions after today's visit, please call [phone].

## 2017-10-16 NOTE — PATIENT INSTRUCTIONS
10 [facility]   Statement to Patients  April 1, 2014      In an effort to ensure the large volume of patient prescription refills is processed in the most efficient and expeditious manner, we are asking our patients to assist us by calling your Pharmacy for all prescription refills, this will include also your  Mail Order Pharmacy. The pharmacy will contact our office electronically to continue the refill process. Please do not wait until the last minute to call your pharmacy. We need at least 48 hours (2days) to fill prescriptions. We also encourage you to call your pharmacy before going to  your prescription to make sure it is ready. With regard to controlled substance prescription refill requests (narcotic refills) that need to be picked up at our office, we ask your cooperation by providing us with at least 72 hours (3days) notice that you will need a refill. We will not refill narcotic prescription refill requests after 4:00pm on any weekday, Monday through Thursday, or after 2:00pm on Fridays, or on the weekends. We encourage everyone to explore another way of getting your prescription refill request processed using Offerum, our patient web portal through our electronic medical record system. Offerum is an efficient and effective way to communicate your medication request directly to the office and  downloadable as an noel on your smart phone . Offerum also features a review functionality that allows you to view your medication list as well as leave messages for your physician. Are you ready to get connected? If so please review the attatched instructions or speak to any of our staff to get you set up right away! Thank you so much for your cooperation. Should you have any questions please contact our Practice Administrator. The Physicians and Staff,  [facility] [address] [address]  What is a living will?   A living will is a legal form you use to write down the kind of care you want at the end of your life. It is used by the health professionals who will treat you if you aren't able to decide for yourself. If you put your wishes in writing, your loved ones and others will know what kind of care you want. They won't need to guess. This can ease your mind and be helpful to others. A living will is not the same as an estate or property will. An estate will explains what you want to happen with your money and property after you die. Is a living will a legal document? A living will is a legal document. Each state has its own laws about living naqvi. If you move to another state, make sure that your living will is legal in the state where you now live. Or you might use a universal form that has been approved by many states. This kind of form can sometimes be completed and stored online. Your electronic copy will then be available wherever you have a connection to the Internet. In most cases, doctors will respect your wishes even if you have a form from a different state. · You don't need an  to complete a living will. But legal advice can be helpful if your state's laws are unclear, your health history is complicated, or your family can't agree on what should be in your living will. · You can change your living will at any time. Some people find that their wishes about end-of-life care change as their health changes. · In addition to making a living will, think about completing a medical power of  form. This form lets you name the person you want to make end-of-life treatment decisions for you (your \"health care agent\") if you're not able to. Many hospitals and nursing homes will give you the forms you need to complete a living will and a medical power of . · Your living will is used only if you can't make or communicate decisions for yourself anymore.  If you become able to make decisions again, you can accept or refuse any treatment, no matter what you wrote in your living will. · Your state may offer an online registry. This is a place where you can store your living will online so the doctors and nurses who need to treat you can find it right away. What should you think about when creating a living will? Talk about your end-of-life wishes with your family members and your doctor. Let them know what you want. That way the people making decisions for you won't be surprised by your choices. Think about these questions as you make your living will:  · Do you know enough about life support methods that might be used? If not, talk to your doctor so you know what might be done if you can't breathe on your own, your heart stops, or you're unable to swallow. · What things would you still want to be able to do after you receive life-support methods? Would you want to be able to walk? To speak? To eat on your own? To live without the help of machines? · If you have a choice, where do you want to be cared for? In your home? At a hospital or nursing home? · Do you want certain Roman Catholic practices performed if you become very ill? · If you have a choice at the end of your life, where would you prefer to die? At home? In a hospital or nursing home? Somewhere else? · Would you prefer to be buried or cremated? · Do you want your organs to be donated after you die? What should you do with your living will? · Make sure that your family members and your health care agent have copies of your living will. · Give your doctor a copy of your living will to keep in your medical record. If you have more than one doctor, make sure that each one has a copy. · You may want to put a copy of your living will where it can be easily found. Where can you learn more? Go to http://irene-jing.info/. Enter O280 in the search box to learn more about \"Learning About Living Persydnee. \"  Current as of: August 8, 2016  Content Version: 11.3  © 7755-6728 [employer]. Care instructions adapted under license by [employer] (which disclaims liability or warranty for this information). If you have questions about a medical condition or this instruction, always ask your healthcare professional. [facility] [address] any warranty or liability for your use of this information. Advance Directives: Care Instructions  Your Care Instructions  An advance directive is a legal way to state your wishes at the end of your life. It tells your family and your doctor what to do if you can no longer say what you want. There are two main types of advance directives. You can change them any time that your wishes change. · A living will tells your family and your doctor your wishes about life support and other treatment. · A durable power of  for health care lets you name a person to make treatment decisions for you when you can't speak for yourself. This person is called a health care agent. If you do not have an advance directive, decisions about your medical care may be made by a doctor or a  who doesn't know you. It may help to think of an advance directive as a gift to the people who care for you. If you have one, they won't have to make tough decisions by themselves. Follow-up care is a key part of your treatment and safety. Be sure to make and go to all appointments, and call your doctor if you are having problems. It's also a good idea to know your test results and keep a list of the medicines you take. How can you care for yourself at home? · Discuss your wishes with your loved ones and your doctor. This way, there are no surprises. · Many states have a unique form. Or you might use a universal form that has been approved by many states. This kind of form can sometimes be completed and stored online.  Your electronic copy will then be available wherever you have a connection to the Internet. In most cases, doctors will respect your wishes even if you have a form from a different state. · You don't need a  to do an advance directive. But you may want to get legal advice. · Think about these questions when you prepare an advance directive:  ¨ Who do you want to make decisions about your medical care if you are not able to? Many people choose a family member or close friend. ¨ Do you know enough about life support methods that might be used? If not, talk to your doctor so you understand. ¨ What are you most afraid of that might happen? You might be afraid of having pain, losing your independence, or being kept alive by machines. ¨ Where would you prefer to die? Choices include your home, a hospital, or a nursing home. ¨ Would you like to have information about hospice care to support you and your family? ¨ Do you want to donate organs when you die? ¨ Do you want certain Advent practices performed before you die? If so, put your wishes in the advance directive. · Read your advance directive every year, and make changes as needed. When should you call for help? Be sure to contact your doctor if you have any questions. Where can you learn more? Go to http://irene-jing.info/. Enter R264 in the search box to learn more about \"Advance Directives: Care Instructions. \"  Current as of: November 17, 2016  Content Version: 11.3  © 2492-9827 [employer]. Care instructions adapted under license by [employer] (which disclaims liability or warranty for this information). If you have questions about a medical condition or this instruction, always ask your healthcare professional. [address] any warranty or liability for your use of this information.

## 2017-10-16 NOTE — PROGRESS NOTES
Pt is here for follow up and MRI, EEG and doppler. Patients  states that flashing lights causes severe migraines, dizziness and nausea. Dr. Sue Navarro told pt not to take any tylenol or aleve and this started after she stopped. Pt has had incontinence on 3 separate occasions, all while sleeping and within the past 3 weeks following a migraine.  states she has fallen out of bed as well. She has bitten the inside of her cheeks. Her left hand through her wrist is numb. Patient states one afternoon she woke up from a nap and her pupils were dilated and had a haziness in her vision for 3 hours. Patient has approximately 20 migraines per month lasting 2-3 hours minimum.

## 2017-10-20 ENCOUNTER — OFFICE VISIT (OUTPATIENT)
Dept: NEUROLOGY | Age: 37
End: 2017-10-20

## 2017-10-20 DIAGNOSIS — H53.9 VISUAL DISTURBANCE: Primary | ICD-10-CM

## 2017-10-20 NOTE — PROGRESS NOTES
EMG/ NCS Report  [facility], [address]  Ph: [phone]/ [phone]  FAX: [phone]/ [phone]  Test Date:  10/20/2017    Patient: Mirna Sanz : 1980 Physician: PREMA GOMES MD   Sex: Female Height: ' \" Ref Phys: Darol Plush   ID#: [id number] Weight:  lbs. Technician: Yadira Miller     Patient History / Exam:  CC:ABN MRI,VISUAL DISTURBANCE. EMG & NCV Findings:    Impression:        ___________________________  PREMA GOMES MD      VEP     Trace N75 (ms) P100 (ms) N145 (ms) H18-N397 (µV)   Norm  <117     *L :  69.9 94.9 150.0 2.16   *R :  69.9 94.9 150.0 3.09   L-R Norm  <7     L-R 0.0 0.0 0.0 0.93   *A 92 millisecond delay was used for the monitor.                    Waveforms:

## 2017-10-23 ENCOUNTER — TELEPHONE (OUTPATIENT)
Dept: NEUROLOGY | Age: 37
End: 2017-10-23

## 2017-10-23 NOTE — TELEPHONE ENCOUNTER
Patient called and would like to know what type of creams she can use for the issues she's having. Also if she can get the flu shot?

## 2017-10-24 NOTE — TELEPHONE ENCOUNTER
Called and spoke to patient she states she has been having loss of balance and last fri she had loss of vision in both eyes lasting 3-4 hrs dizziness and she has been checking her blood sugar it has been staying low even while she has been eating #69    Also would like to know if she can have flu/pneumonia vaccine?   Please advise

## 2017-10-25 NOTE — TELEPHONE ENCOUNTER
Called and spoke to patient relayed per NP      Low blood sugar can cause some of those symptoms especially at 68. Her MRI cervical spine is tomorrow this can evaluate for trouble with balance, and her neck.    with her vision loss she has a VEP scheduled I think soon and her 24 hour EEG also. If she feels like her symptoms are to the point where they are bad enoguh to go to the ER she should.  Stroke can cause some fo these if she has weakness, slurred speech, trouble with balance acutely, or anything questionable go to the ER otherwise her testing is all set up for her and we do not do flu or pneumonia the local drug stores will do both and so will PCP      Patient states understanding

## 2017-10-26 ENCOUNTER — HOSPITAL ENCOUNTER (OUTPATIENT)
Dept: NEUROLOGY | Age: 37
Discharge: HOME | End: 2017-10-26
Attending: NURSE PRACTITIONER
Payer: MEDICAID

## 2017-10-26 ENCOUNTER — HOSPITAL ENCOUNTER (OUTPATIENT)
Dept: MRI IMAGING | Age: 37
Discharge: HOME | End: 2017-10-26
Attending: NURSE PRACTITIONER
Payer: MEDICAID

## 2017-10-26 VITALS — BODY MASS INDEX: 31.58 KG/M2 | WEIGHT: 184 LBS

## 2017-10-26 DIAGNOSIS — H53.9 VISUAL DISTURBANCE: ICD-10-CM

## 2017-10-26 DIAGNOSIS — R93.89 ABNORMAL MRI: ICD-10-CM

## 2017-10-26 DIAGNOSIS — G43.919 INTRACTABLE MIGRAINE WITHOUT STATUS MIGRAINOSUS, UNSPECIFIED MIGRAINE TYPE: ICD-10-CM

## 2017-10-26 DIAGNOSIS — R40.4 SPELL OF ALTERED CONSCIOUSNESS: ICD-10-CM

## 2017-10-26 PROCEDURE — 72156 MRI NECK SPINE W/O & W/DYE: CPT

## 2017-10-26 PROCEDURE — A9576 INJ PROHANCE MULTIPACK: HCPCS | Performed by: RADIOLOGY

## 2017-10-26 PROCEDURE — 95953 NEURO EEG 24 HR: CPT

## 2017-10-26 PROCEDURE — 74011250636 HC RX REV CODE- 250/636: Performed by: RADIOLOGY

## 2017-10-26 RX ADMIN — GADOTERIDOL 15 ML: 279.3 INJECTION, SOLUTION INTRAVENOUS at 11:40

## 2017-10-30 NOTE — PROCEDURES
[facility] [place]   Visual Evoked Potential Report    Date of Service: 10/20/2017  Referring: Deejay Benson NP  Indication: Visual Disturbance    Bilateral full field pattern reversal visual evoked potential is performed. Waveforms are appropriate for interpretation. Absolute latency of the P100 is normal bilaterally.     Interpretation  Normal Study

## 2017-10-31 NOTE — PROCEDURES
[facility] [address]   [address] [facility], [address]   ROUTINE EEG REPORT       Name:  Sarah Rosenthal   MR#:  [id number]   :  1980   Account #:  [de-identified]    Date of Procedure:  10/26/2017   Date of Adm:  10/26/2017       REQUESTING PHYSICIAN: Shari Mitchell NP     INDICATIONS: A 24-hour ambulatory EEG is requested in this 44  year-old woman with nocturnal episodes suspicious for seizure to   evaluate for epileptiform abnormalities. MEDICATIONS: Listed as   1. Imitrex. 2. Topamax. 3. Zoloft. 4. Valium. RECORDING START TIME: 1231 hours, 10/26/2017. RECORDING END TIME: 1000 hours, 10/27/2017. TOTAL RECORDING TIME: 21 hours 29 minutes. A patient diary accompanies the tracing. Computerized spike and   seizure detection software was utilized. FINDINGS: During wakefulness, the background consists of diffuse   low-voltage fast frequency beta wave activity. Normal sleep architecture is seen. The patient diary has entries for loss of balance, right arm goes numb. During these times review of EEG data prior to, during and   immediately following events finds no change in EEG background. Review of computerized spike and seizure detection software reveals   no spikes and no seizures. INTERPRETATION: A 24-hour ambulatory electroencephalogram is   normal. No epileptiform abnormalities are seen. No electrographic   correlate to the patient's diary entries. Should the patient continue to   have frequent events suspicious for seizure, consideration should be   given for continuous electroencephalogram with simultaneous video   monitoring as such is on the epilepsy monitoring unit if clinically   indicated.         MD ANGIE Gonzalez / PAG   D:  10/30/2017   08:06   T:  10/31/2017   09:08   Job #:  [id number]

## 2017-11-03 ENCOUNTER — OFFICE VISIT (OUTPATIENT)
Dept: NEUROLOGY | Age: 37
End: 2017-11-03

## 2017-11-03 ENCOUNTER — TELEPHONE (OUTPATIENT)
Dept: NEUROLOGY | Age: 37
End: 2017-11-03

## 2017-11-03 VITALS
BODY MASS INDEX: 31.99 KG/M2 | SYSTOLIC BLOOD PRESSURE: 112 MMHG | DIASTOLIC BLOOD PRESSURE: 86 MMHG | WEIGHT: 187.4 LBS | HEIGHT: 64 IN

## 2017-11-03 DIAGNOSIS — R40.4 SPELL OF ALTERED CONSCIOUSNESS: Primary | ICD-10-CM

## 2017-11-03 DIAGNOSIS — R20.2 NUMBNESS AND TINGLING IN LEFT ARM: ICD-10-CM

## 2017-11-03 DIAGNOSIS — G43.919 INTRACTABLE MIGRAINE WITHOUT STATUS MIGRAINOSUS, UNSPECIFIED MIGRAINE TYPE: ICD-10-CM

## 2017-11-03 DIAGNOSIS — R20.0 NUMBNESS AND TINGLING IN LEFT ARM: ICD-10-CM

## 2017-11-03 RX ORDER — SUMATRIPTAN 100 MG/1
TABLET, FILM COATED ORAL
Qty: 9 TAB | Refills: 5 | Status: SHIPPED | OUTPATIENT
Start: 2017-11-03 | End: 2018-05-16

## 2017-11-03 RX ORDER — PREDNISONE 10 MG/1
TABLET ORAL
Qty: 42 TAB | Refills: 0 | Status: SHIPPED | OUTPATIENT
Start: 2017-11-03 | End: 2017-11-15 | Stop reason: SDUPTHER

## 2017-11-03 NOTE — PATIENT INSTRUCTIONS
10 [facility]   Statement to Patients  April 1, 2014      In an effort to ensure the large volume of patient prescription refills is processed in the most efficient and expeditious manner, we are asking our patients to assist us by calling your Pharmacy for all prescription refills, this will include also your  Mail Order Pharmacy. The pharmacy will contact our office electronically to continue the refill process. Please do not wait until the last minute to call your pharmacy. We need at least 48 hours (2days) to fill prescriptions. We also encourage you to call your pharmacy before going to  your prescription to make sure it is ready. With regard to controlled substance prescription refill requests (narcotic refills) that need to be picked up at our office, we ask your cooperation by providing us with at least 72 hours (3days) notice that you will need a refill. We will not refill narcotic prescription refill requests after 4:00pm on any weekday, Monday through Thursday, or after 2:00pm on Fridays, or on the weekends. We encourage everyone to explore another way of getting your prescription refill request processed using Introhive, our patient web portal through our electronic medical record system. Introhive is an efficient and effective way to communicate your medication request directly to the office and  downloadable as an noel on your smart phone . Introhive also features a review functionality that allows you to view your medication list as well as leave messages for your physician. Are you ready to get connected? If so please review the attatched instructions or speak to any of our staff to get you set up right away! Thank you so much for your cooperation. Should you have any questions please contact our Practice Administrator.     The Physicians and Staff,  [facility] [address] [address]  What is a living will?    A living will is a legal form you use to write down the kind of care you want at the end of your life. It is used by the health professionals who will treat you if you aren't able to decide for yourself. If you put your wishes in writing, your loved ones and others will know what kind of care you want. They won't need to guess. This can ease your mind and be helpful to others. A living will is not the same as an estate or property will. An estate will explains what you want to happen with your money and property after you die. Is a living will a legal document? A living will is a legal document. Each state has its own laws about living naqvi. If you move to another state, make sure that your living will is legal in the state where you now live. Or you might use a universal form that has been approved by many states. This kind of form can sometimes be completed and stored online. Your electronic copy will then be available wherever you have a connection to the Internet. In most cases, doctors will respect your wishes even if you have a form from a different state. · You don't need an  to complete a living will. But legal advice can be helpful if your state's laws are unclear, your health history is complicated, or your family can't agree on what should be in your living will. · You can change your living will at any time. Some people find that their wishes about end-of-life care change as their health changes. · In addition to making a living will, think about completing a medical power of  form. This form lets you name the person you want to make end-of-life treatment decisions for you (your \"health care agent\") if you're not able to. Many hospitals and nursing homes will give you the forms you need to complete a living will and a medical power of . · Your living will is used only if you can't make or communicate decisions for yourself anymore.  If you become able to make decisions again, you can accept or refuse any treatment, no matter what you wrote in your living will. · Your state may offer an online registry. This is a place where you can store your living will online so the doctors and nurses who need to treat you can find it right away. What should you think about when creating a living will? Talk about your end-of-life wishes with your family members and your doctor. Let them know what you want. That way the people making decisions for you won't be surprised by your choices. Think about these questions as you make your living will:  · Do you know enough about life support methods that might be used? If not, talk to your doctor so you know what might be done if you can't breathe on your own, your heart stops, or you're unable to swallow. · What things would you still want to be able to do after you receive life-support methods? Would you want to be able to walk? To speak? To eat on your own? To live without the help of machines? · If you have a choice, where do you want to be cared for? In your home? At a hospital or nursing home? · Do you want certain Christianity practices performed if you become very ill? · If you have a choice at the end of your life, where would you prefer to die? At home? In a hospital or nursing home? Somewhere else? · Would you prefer to be buried or cremated? · Do you want your organs to be donated after you die? What should you do with your living will? · Make sure that your family members and your health care agent have copies of your living will. · Give your doctor a copy of your living will to keep in your medical record. If you have more than one doctor, make sure that each one has a copy. · You may want to put a copy of your living will where it can be easily found. Where can you learn more? Go to http://irene-jing.info/. Enter H620 in the search box to learn more about \"Learning About Living Persydnee. \"  Current as of: September 24, 2016  Content Version: 11.4  © 9657-9010 [employer]. Care instructions adapted under license by [employer] (which disclaims liability or warranty for this information). If you have questions about a medical condition or this instruction, always ask your healthcare professional. [address] any warranty or liability for your use of this information. Advance Directives: Care Instructions  Your Care Instructions  An advance directive is a legal way to state your wishes at the end of your life. It tells your family and your doctor what to do if you can no longer say what you want. There are two main types of advance directives. You can change them any time that your wishes change. · A living will tells your family and your doctor your wishes about life support and other treatment. · A durable power of  for health care lets you name a person to make treatment decisions for you when you can't speak for yourself. This person is called a health care agent. If you do not have an advance directive, decisions about your medical care may be made by a doctor or a  who doesn't know you. It may help to think of an advance directive as a gift to the people who care for you. If you have one, they won't have to make tough decisions by themselves. Follow-up care is a key part of your treatment and safety. Be sure to make and go to all appointments, and call your doctor if you are having problems. It's also a good idea to know your test results and keep a list of the medicines you take. How can you care for yourself at home? · Discuss your wishes with your loved ones and your doctor. This way, there are no surprises. · Many states have a unique form. Or you might use a universal form that has been approved by many states. This kind of form can sometimes be completed and stored online.  Your electronic copy will then be available wherever you have a connection to the Internet. In most cases, doctors will respect your wishes even if you have a form from a different state. · You don't need a  to do an advance directive. But you may want to get legal advice. · Think about these questions when you prepare an advance directive:  ¨ Who do you want to make decisions about your medical care if you are not able to? Many people choose a family member or close friend. ¨ Do you know enough about life support methods that might be used? If not, talk to your doctor so you understand. ¨ What are you most afraid of that might happen? You might be afraid of having pain, losing your independence, or being kept alive by machines. ¨ Where would you prefer to die? Choices include your home, a hospital, or a nursing home. ¨ Would you like to have information about hospice care to support you and your family? ¨ Do you want to donate organs when you die? ¨ Do you want certain Sikh practices performed before you die? If so, put your wishes in the advance directive. · Read your advance directive every year, and make changes as needed. When should you call for help? Be sure to contact your doctor if you have any questions. Where can you learn more? Go to http://irene-jing.info/. Enter R264 in the search box to learn more about \"Advance Directives: Care Instructions. \"  Current as of: September 24, 2016  Content Version: 11.4  © 7242-3295 [employer]. Care instructions adapted under license by [employer] (which disclaims liability or warranty for this information). If you have questions about a medical condition or this instruction, always ask your healthcare professional. [address] any warranty or liability for your use of this information.

## 2017-11-03 NOTE — PROGRESS NOTES
Jena Mott is a 44 y.o. female who presents with the following  Chief Complaint   Patient presents with    Results     VEP, EEG, MRI cervical spine       HPI Patient comes in for a follow up for test results. She has noticed since last visit she has wet herself multiple times over night. Is waking up having bite her tongue and side of her mouth. Has even woken up on the floor of the room a few times. She has woken up very stiff and sore out of bed also. No during the day but just feels slow, confused. She is noticing that she is getting about 20 migrianes a month lasting all day everyday. Located in entire head. Has associated dizziness, visual disturbance where she will see flashing lights, nausea, light sound smell sensitive. Also has visual concerns. Back on Topamax now and titrating up to 100 mg but is on 75 right now. Has noticed not as painful or intense as they usually are but are still daily and lasting all day. She uses imitrex tablets for abortive therapy and these work well usually after 2 full tablets. She states her left arm weakness, numbness, and tingling is the same. Chronic, daily and she has trouble opening jars at times, keeping strength up in her left arm. She had an MRI cervical spine, VEP, 24 hour EEG and doppler. Allergies   Allergen Reactions    Pcn [Penicillins] Unknown (comments)       Current Outpatient Prescriptions   Medication Sig    predniSONE (DELTASONE) 10 mg tablet 6 po x2 days, 5 po x 2days, 4 po x 2days, 3 po x2days, 2 po x2days, 1 po x 2days    SUMAtriptan (IMITREX) 100 mg tablet 1 at HA onset and repeat in 2 hours if needed. Max 2 in 24 hours    SUMAtriptan (IMITREX) 100 mg tablet 1 at HA onset and repeat in 2 hours if needed.   Max 2 in 24 hours    topiramate (TOPAMAX) 100 mg tablet Take 1 tablet by mouth nightly    topiramate (TOPAMAX) 25 mg tablet 25 mg nightly for 1 week then, 50 mg nightly for 1 week then, 75 mg nightly for 1 week then, 100mg nightly thereafter    sertraline (ZOLOFT) 50 mg tablet 100 mg daily.  diazePAM (VALIUM) 5 mg tablet 5 mg three (3) times daily as needed.  triamcinolone acetonide (KENALOG) 0.5 % ointment      No current facility-administered medications for this visit. History   Smoking Status    Current Every Day Smoker    Packs/day: 0.50   Smokeless Tobacco    Never Used       Past Medical History:   Diagnosis Date    Anxiety     Anxiety     Chest pain     Depression     Diabetes (HCC)     Fatigue     Headache     Headache(784.0)     Memory loss     Neuropathy     PTSD (post-traumatic stress disorder)     Rash     Ringing in ears     Ringing in ears     Skipped beats     Snoring     Visual disturbance        Past Surgical History:   Procedure Laterality Date    HX GYN         Family History   Problem Relation Age of Onset    Headache Mother     Neuropathy Mother     Cancer Father     Seizures Other     Heart Disease Other     Stroke Other        Social History     Social History    Marital status: SINGLE     Spouse name: N/A    Number of children: N/A    Years of education: N/A     Social History Main Topics    Smoking status: Current Every Day Smoker     Packs/day: 0.50    Smokeless tobacco: Never Used    Alcohol use Yes      Comment: weekends    Drug use: No    Sexual activity: Not Asked     Other Topics Concern    None     Social History Narrative       Review of Systems   HENT: Negative for ear pain, hearing loss and tinnitus. Eyes: Positive for blurred vision and photophobia. Respiratory: Negative for shortness of breath and wheezing. Cardiovascular: Negative for chest pain and palpitations. Gastrointestinal: Positive for nausea. Negative for vomiting. Neurological: Positive for dizziness, tingling, sensory change, seizures, loss of consciousness, weakness and headaches. Negative for speech change. Remainder of comprehensive review is negative.      Physical Exam :    Visit Vitals    /86    Ht 5' 4\" (1.626 m)    Wt 85 kg (187 lb 6.4 oz)    BMI 32.17 kg/m2               Results for orders placed or performed during the hospital encounter of 17   POC CREATININE   Result Value Ref Range    Creatinine (POC) 0.9 0.6 - 1.3 MG/DL    GFRAA, POC >60 >60 ml/min/1.73m2    GFRNA, POC >60 >60 ml/min/1.73m2       Orders Placed This Encounter    REFERRAL TO NEUROLOGY     Referral Priority:   Routine     Referral Type:   Consultation     Referral Reason:   Specialty Services Required    REFERRAL TO PHYSICAL THERAPY     Referral Priority:   Routine     Referral Type:   PT/OT/ST     Referral Reason:   Specialty Services Required    predniSONE (DELTASONE) 10 mg tablet     Si po x2 days, 5 po x 2days, 4 po x 2days, 3 po x2days, 2 po x2days, 1 po x 2days     Dispense:  42 Tab     Refill:  0    SUMAtriptan (IMITREX) 100 mg tablet     Si at HA onset and repeat in 2 hours if needed. Max 2 in 24 hours     Dispense:  9 Tab     Refill:  5       1. Spell of altered consciousness    2. Numbness and tingling in left arm    3. Intractable migraine without status migrainosus, unspecified migraine type        Follow-up Disposition:  Return in about 6 weeks (around 12/15/2017). Frequent altered level of consciousness vs. Questionable seizure activity at night. We have had normal EEG and normal 24 hour eeg. She is still having frequent spells weekly basis. We will refer to EMU to figure out what her spells are as they are frequent. MRI cervical spine showed severe foraminal stenosis on left side in a few levels and for this she has neck pain and left arm symptoms. We will try prednisone taper and PT referral to help with this at [facility]. We will perhaps need an EMG to correlate in the future we will see how PT goes. Continue Topamax and call if this does not help headachs at the higher dose.        This note will not be viewable in [facility]

## 2017-11-03 NOTE — MR AVS SNAPSHOT
Visit Information Date & Time Provider Department Dept. Phone Encounter #  
 11/3/2017  2:00 PM HERBIE Goldman Neurology [facility] [phone] [id number] Follow-up Instructions Return in about 6 weeks (around 12/15/2017). Upcoming Health Maintenance Date Due Pneumococcal 19-64 Medium Risk (1 of 1 - PPSV23) 3/3/1998 DTaP/Tdap/Td series (1 - Tdap) 3/3/2000 PAP AKA CERVICAL CYTOLOGY 3/3/2000 INFLUENZA AGE 9 TO ADULT 8/1/2017 Allergies as of 11/3/2017  Review Complete On: 11/3/2017 By: Delma Perkins Severity Noted Reaction Type Reactions Pcn [Penicillins]  03/28/2014    Unknown (comments) Current Immunizations  Never Reviewed No immunizations on file. Not reviewed this visit You Were Diagnosed With   
  
 Codes Comments Spell of altered consciousness    -  Primary ICD-10-CM: R40.4 ICD-9-CM: 780.09 Numbness and tingling in left arm     ICD-10-CM: R20.0, R20.2 ICD-9-CM: 782.0 Intractable migraine without status migrainosus, unspecified migraine type     ICD-10-CM: G43.919 ICD-9-CM: 346.91 Vitals BP Height(growth percentile) Weight(growth percentile) BMI OB Status Smoking Status 112/86 5' 4\" (1.626 m) 187 lb 6.4 oz (85 kg) 32.17 kg/m2 IUD Current Every Day Smoker BMI and BSA Data Body Mass Index Body Surface Area  
 32.17 kg/m 2 1.96 m 2 Preferred Pharmacy Pharmacy Name Phone [facility] [phone] Your Updated Medication List  
  
   
This list is accurate as of: 11/3/17  2:50 PM.  Always use your most recent med list.  
  
  
  
  
 diazePAM 5 mg tablet Commonly known as:  VALIUM  
5 mg three (3) times daily as needed. predniSONE 10 mg tablet Commonly known as:  DELTASONE  
6 po x2 days, 5 po x 2days, 4 po x 2days, 3 po x2days, 2 po x2days, 1 po x 2days  
  
 sertraline 50 mg tablet Commonly known as:  ZOLOFT  
100 mg daily. * SUMAtriptan 100 mg tablet Commonly known as:  IMITREX  
1 at HA onset and repeat in 2 hours if needed. Max 2 in 24 hours * SUMAtriptan 100 mg tablet Commonly known as:  IMITREX  
1 at HA onset and repeat in 2 hours if needed. Max 2 in 24 hours * topiramate 100 mg tablet Commonly known as:  TOPAMAX Take 1 tablet by mouth nightly * topiramate 25 mg tablet Commonly known as:  TOPAMAX 25 mg nightly for 1 week then, 50 mg nightly for 1 week then, 75 mg nightly for 1 week then, 100mg nightly thereafter  
  
 triamcinolone acetonide 0.5 % ointment Commonly known as:  KENALOG * Notice: This list has 4 medication(s) that are the same as other medications prescribed for you. Read the directions carefully, and ask your doctor or other care provider to review them with you. Prescriptions Sent to Pharmacy Refills  
 predniSONE (DELTASONE) 10 mg tablet 0 Si po x2 days, 5 po x 2days, 4 po x 2days, 3 po x2days, 2 po x2days, 1 po x 2days Class: Normal  
 Pharmacy: [address] Ph #: [phone] SUMAtriptan (IMITREX) 100 mg tablet 5 Si at HA onset and repeat in 2 hours if needed. Max 2 in 24 hours Class: Normal  
 Pharmacy: [facility] Ma [address] [place] [address] [place], [facility]  [facility] Ph #: [phone] We Performed the Following REFERRAL TO NEUROLOGY [QHQ56 Custom] Comments: EMU  
 REFERRAL TO PHYSICAL THERAPY [WJU20 Custom] Comments:  
 [facility] Villa Follow-up Instructions Return in about 6 weeks (around 12/15/2017). Referral Information Referral ID Referred By Referred To  
  
 [id number] Millie Born [facility] Not Available Visits Status Start Date End Date 1 New Request 11/3/17 11/3/18  If your referral has a status of pending review or denied, additional information will be sent to support the outcome of this decision. Referral ID Referred By Referred To  
 [id number] Damir Case Not Available Visits Status Start Date End Date 1 New Request 11/3/17 11/3/18 If your referral has a status of pending review or denied, additional information will be sent to support the outcome of this decision. Patient Instructions PRESCRIPTION REFILL POLICY [facility] Statement to Patients April 1, 2014 In an effort to ensure the large volume of patient prescription refills is processed in the most efficient and expeditious manner, we are asking our patients to assist us by calling your Pharmacy for all prescription refills, this will include also your  Mail Order Pharmacy. The pharmacy will contact our office electronically to continue the refill process. Please do not wait until the last minute to call your pharmacy. We need at least 48 hours (2days) to fill prescriptions. We also encourage you to call your pharmacy before going to  your prescription to make sure it is ready. With regard to controlled substance prescription refill requests (narcotic refills) that need to be picked up at our office, we ask your cooperation by providing us with at least 72 hours (3days) notice that you will need a refill. We will not refill narcotic prescription refill requests after 4:00pm on any weekday, Monday through Thursday, or after 2:00pm on Fridays, or on the weekends. We encourage everyone to explore another way of getting your prescription refill request processed using [employer], our patient web portal through our electronic medical record system. [employer] is an efficient and effective way to communicate your medication request directly to the office and  downloadable as an noel on your smart phone .  [employer] also features a review functionality that allows you to view your medication list as well as leave messages for your physician. Are you ready to get connected? If so please review the attatched instructions or speak to any of our staff to get you set up right away! Thank you so much for your cooperation. Should you have any questions please contact our Practice Administrator. The Physicians and Staff,  [facility] [address] 1896 What is a living will? A living will is a legal form you use to write down the kind of care you want at the end of your life. It is used by the health professionals who will treat you if you aren't able to decide for yourself. If you put your wishes in writing, your loved ones and others will know what kind of care you want. They won't need to guess. This can ease your mind and be helpful to others. A living will is not the same as an estate or property will. An estate will explains what you want to happen with your money and property after you die. Is a living will a legal document? A living will is a legal document. Each state has its own laws about living naqvi. If you move to another state, make sure that your living will is legal in the state where you now live. Or you might use a universal form that has been approved by many states. This kind of form can sometimes be completed and stored online. Your electronic copy will then be available wherever you have a connection to the Internet. In most cases, doctors will respect your wishes even if you have a form from a different state. · You don't need an  to complete a living will. But legal advice can be helpful if your state's laws are unclear, your health history is complicated, or your family can't agree on what should be in your living will. · You can change your living will at any time. Some people find that their wishes about end-of-life care change as their health changes.  
· In addition to making a living will, think about completing a medical power of  form. This form lets you name the person you want to make end-of-life treatment decisions for you (your \"health care agent\") if you're not able to. Many hospitals and nursing homes will give you the forms you need to complete a living will and a medical power of . · Your living will is used only if you can't make or communicate decisions for yourself anymore. If you become able to make decisions again, you can accept or refuse any treatment, no matter what you wrote in your living will. · Your state may offer an online registry. This is a place where you can store your living will online so the doctors and nurses who need to treat you can find it right away. What should you think about when creating a living will? Talk about your end-of-life wishes with your family members and your doctor. Let them know what you want. That way the people making decisions for you won't be surprised by your choices. Think about these questions as you make your living will: · Do you know enough about life support methods that might be used? If not, talk to your doctor so you know what might be done if you can't breathe on your own, your heart stops, or you're unable to swallow. · What things would you still want to be able to do after you receive life-support methods? Would you want to be able to walk? To speak? To eat on your own? To live without the help of machines? · If you have a choice, where do you want to be cared for? In your home? At a hospital or nursing home? · Do you want certain Restorationism practices performed if you become very ill? · If you have a choice at the end of your life, where would you prefer to die? At home? In a hospital or nursing home? Somewhere else? · Would you prefer to be buried or cremated? · Do you want your organs to be donated after you die? What should you do with your living will?  
· Make sure that your family members and your health care agent have copies of your living will. · Give your doctor a copy of your living will to keep in your medical record. If you have more than one doctor, make sure that each one has a copy. · You may want to put a copy of your living will where it can be easily found. Where can you learn more? Go to http://irene-jing.info/. Enter Q510 in the search box to learn more about \"Learning About Living Efrain. \" Current as of: September 24, 2016 Content Version: 11.4 © 1603-8926 [employer]. Care instructions adapted under license by [employer] (which disclaims liability or warranty for this information). If you have questions about a medical condition or this instruction, always ask your healthcare professional. [address] any warranty or liability for your use of this information. Advance Directives: Care Instructions Your Care Instructions An advance directive is a legal way to state your wishes at the end of your life. It tells your family and your doctor what to do if you can no longer say what you want. There are two main types of advance directives. You can change them any time that your wishes change. · A living will tells your family and your doctor your wishes about life support and other treatment. · A durable power of  for health care lets you name a person to make treatment decisions for you when you can't speak for yourself. This person is called a health care agent. If you do not have an advance directive, decisions about your medical care may be made by a doctor or a  who doesn't know you. It may help to think of an advance directive as a gift to the people who care for you. If you have one, they won't have to make tough decisions by themselves. Follow-up care is a key part of your treatment and safety.  Be sure to make and go to all appointments, and call your doctor if you are having problems. It's also a good idea to know your test results and keep a list of the medicines you take. How can you care for yourself at home? · Discuss your wishes with your loved ones and your doctor. This way, there are no surprises. · Many states have a unique form. Or you might use a universal form that has been approved by many states. This kind of form can sometimes be completed and stored online. Your electronic copy will then be available wherever you have a connection to the Internet. In most cases, doctors will respect your wishes even if you have a form from a different state. · You don't need a  to do an advance directive. But you may want to get legal advice. · Think about these questions when you prepare an advance directive: ¨ Who do you want to make decisions about your medical care if you are not able to? Many people choose a family member or close friend. ¨ Do you know enough about life support methods that might be used? If not, talk to your doctor so you understand. ¨ What are you most afraid of that might happen? You might be afraid of having pain, losing your independence, or being kept alive by machines. ¨ Where would you prefer to die? Choices include your home, a hospital, or a nursing home. ¨ Would you like to have information about hospice care to support you and your family? ¨ Do you want to donate organs when you die? ¨ Do you want certain Sabianism practices performed before you die? If so, put your wishes in the advance directive. · Read your advance directive every year, and make changes as needed. When should you call for help? Be sure to contact your doctor if you have any questions. Where can you learn more? Go to http://irene-jing.info/. Enter R264 in the search box to learn more about \"Advance Directives: Care Instructions. \" Current as of: September 24, 2016 Content Version: 11.4 © 7389-3223 [employer]. Care instructions adapted under license by [employer] (which disclaims liability or warranty for this information). If you have questions about a medical condition or this instruction, always ask your healthcare professional. [address] any warranty or liability for your use of this information. Introducing [facility]! Dear Radha Trimble: 
Thank you for requesting a mth sense account. Our records indicate that you already have an active mth sense account. You can access your account anytime at [email or web address] Did you know that you can access your hospital and ER discharge instructions at any time in mth sense? You can also review all of your test results from your hospital stay or ER visit. Additional Information If you have questions, please visit the Frequently Asked Questions section of the mth sense website at [email or web address] Remember, mth sense is NOT to be used for urgent needs. For medical emergencies, dial 911. Now available from your iPhone and Android! Please provide this summary of care documentation to your next provider. Your primary care clinician is listed as Vik Freeman. If you have any questions after today's visit, please call [phone].

## 2017-11-03 NOTE — PROGRESS NOTES
Patient is here for MRI, VEP, EEg. She keeps a journal of headaches and symptomes she has. Patient cannot fill treximet until  11/7/17.

## 2017-11-08 ENCOUNTER — TELEPHONE (OUTPATIENT)
Dept: NEUROLOGY | Age: 37
End: 2017-11-08

## 2017-11-10 ENCOUNTER — HOSPITAL ENCOUNTER (OUTPATIENT)
Dept: PHYSICAL THERAPY | Age: 37
Discharge: HOME | End: 2017-11-10
Payer: MEDICAID

## 2017-11-10 ENCOUNTER — APPOINTMENT (OUTPATIENT)
Dept: PHYSICAL THERAPY | Age: 37
End: 2017-11-10
Payer: MEDICAID

## 2017-11-10 PROCEDURE — 97014 ELECTRIC STIMULATION THERAPY: CPT | Performed by: PHYSICAL THERAPIST

## 2017-11-10 PROCEDURE — 97162 PT EVAL MOD COMPLEX 30 MIN: CPT | Performed by: PHYSICAL THERAPIST

## 2017-11-10 PROCEDURE — 97110 THERAPEUTIC EXERCISES: CPT | Performed by: PHYSICAL THERAPIST

## 2017-11-10 PROCEDURE — 97140 MANUAL THERAPY 1/> REGIONS: CPT | Performed by: PHYSICAL THERAPIST

## 2017-11-10 NOTE — PROGRESS NOTES
[address] [facility], [facility], [address]  Phone: [phone]  Fax: [phone]    Plan of Care/ Statement of Necessity for Physical Therapy Services 2-15    Patient name: Rich Salazar  : 1980  Provider#: [id number]  Referral source: Yancy Mendoza NP      Medical/Treatment Diagnosis: Left arm pain [M79.602]     Prior Hospitalization: see medical history     Comorbidities: Depression, heart disease, DM, arthritis, seizure disorder, anxiety, migraines  Prior Level of Function: Independent ADLs, 5year old daughter,   Medications: Verified on Patient Summary List    Start of Care: 11/10/17      Onset Date: 3 months ago       The Plan of Care and following information is based on the information from the initial evaluation. Assessment/ key information: The patient presents with signs and symptoms consistent with cervical radiculopathy complicated by complex PMH,chronic nature of condition, postural dysfunction and inactive lifestyle. Evaluation Complexity History HIGH Complexity :3+ comorbidities / personal factors will impact the outcome/ POC ; Examination HIGH Complexity : 4+ Standardized tests and measures addressing body structure, function, activity limitation and / or participation in recreation  ;Presentation HIGH Complexity : Unstable and unpredictable characteristics  ; Clinical Decision Making MEDIUM Complexity : FOTO score of 26-74  Overall Complexity Rating: MEDIUM    Problem List: pain affecting function, decrease ROM, decrease strength, impaired gait/ balance, decrease ADL/ functional abilitiies, decrease activity tolerance, decrease flexibility/ joint mobility and decrease transfer abilities   Treatment Plan may include any combination of the following: Therapeutic exercise, Physical agent/modality, Gait/balance training, Manual therapy, Patient education, Functional mobility training and Home safety training  Patient / Family readiness to learn indicated by: asking questions, trying to perform skills and interest  Persons(s) to be included in education: patient (P)  Barriers to Learning/Limitations: None  Patient Goal (s): be able to take care of my daughter  Patient Self Reported Health Status: fair  Rehabilitation Potential: good    Short Term Goals: To be accomplished in 4 weeks:  1) The patient will be independent with introductory HEP  2) The patient will improve L shoulder flexion AROM to 120 deg to improve ease with reaching tasks  3) The patient will improve L cervical rotation AROM to 45 deg to improve ease with driving  Long Term Goals: To be accomplished in 12 weeks:  1) The patient will be independent with finalized HEP  2) The patient will demonstrate pain free shoulder AROM WFL to improve ease with household chores  3) The patient will demonstrate pain free cervcial AROM WFL to improve ease with  duties  Frequency / Duration: Patient to be seen 2 times per week for 12 weeks. Patient/ Caregiver education and instruction: self care, activity modification and exercises    [x]  Plan of care has been reviewed with ALEX Marin PT 11/10/2017 9:06 AM    ________________________________________________________________________    I certify that the above Therapy Services are being furnished while the patient is under my care. I agree with the treatment plan and certify that this therapy is necessary.     [de-identified] Signature:____________________  Date:____________Time: _________

## 2017-11-10 NOTE — PROGRESS NOTES
PT INITIAL EVALUATION NOTE 2-15    Patient Name: Jc Griggs  Date:11/10/2017  : 1980  [x]  Patient  Verified  Payor: Rigoberto Ritchie / Plan: Russell Talley / Product Type: Managed Care Medicaid /    In time:8:35 AM  Out time:9:40 AM  Total Treatment Time (min): 65  Visit #: 1     Treatment Area: Left arm pain [M79.602]    SUBJECTIVE  Pain Level (0-10 scale): 9-10/10  Any medication changes, allergies to medications, adverse drug reactions, diagnosis change, or new procedure performed?: [] No    [x] Yes (see summary sheet for update)  Subjective:     Pt reports her doctor thinks he is having seizures at night. Pt reports when she wakes up she looses vision for a few hours and has lost control of her bowels. Pt reports this has happened 3 times this week. \"I have woken up on the floor. \"  Pt reports her pupils are dilated. Pt reports she is scheduled for a sleep study. Pt reports she has been having neck pain and L arm numbness for 3 months. Pain began insidiously. \"I don't feel the cold. \"  Pt reports she has difficulty driving, lifting \"I drop things a lot,\" raising L arm. Pt reports she has had 4-5 falls in the past 3 months. Pt reports if she bends over and goes to stand, she experiences dizziness and then falls. Pt reports she has trouble sleeping. Pt reports at this time she has neck pain and L arm numbness. Pt reports ice helps \"a little bit\"  Pt is taking Prednisone but that has not helped. No hx of neck, shoulder pain. MRI: Mild multilevel disc and facet degenerative change.  Mild canal and moderate to severe left foraminal stenosis at C5-6. Mild canal stenoses at C4-5 and C6-7.       PLOF: Independent ADLs, mother of her 5year old daughter  Mechanism of Injury: Insidious onset  Previous Treatment/Compliance: Yes  PMHx/Surgical Hx: Depression, heart disease, DM, arthritis, Epilepsy?, migraines (04), anxiety  Work Hx: Not working  Living Situation: Pt lives with her parents and her daughter  Pt Goals: \"Take care of my daughter\"  Barriers: complex PMH  Motivation: Good  Substance use: Tobacco, alcohol   FABQ Score: 19%  Cognition: A & O x 3        OBJECTIVE/EXAMINATION  Posture:   Forward head, rounded shoulders, thoracic kyphosis  Other Observations:  Elevated BMI  Gait and Functional Mobility:    Palpation: TTP B levator, B UT      UPPER QUARTER MUSCLE STRENGTH      R  L  C1, C2 Neck Flex  4   C3 Side Flex   4  3+   C4 Sh Elev   4  4   C5 Deltoid/Biceps  4+  4+   C6 Wrist Ext   4+  4+   C7 Triceps   4+  4   C8 Thumb Ext   4+  4   T1 Hand Inst   4+  4        MMT:  see above   : R 53.0 psi L 39.1, 34.7, 40.9 psi    Cervical AROM:        R  L    Flexion    50 deg        Extension   15 deg        Side Bending   30 deg  25 deg        Rotation   50 deg  40 deg    Upper Extremity AROM:          Flexion: R 125 deg L 110 deg  Extension: R 35 deg L 20 deg  Abduction: R 140 deg L 135 deg  External Rotation: R 70 deg L 55 deg  Internal Rotation: R T8 L buttocks     Neurological: Sensation: decreased throughout LUE    Modality rationale: decrease pain and increase tissue extensibility to improve the patients ability to sit, stand, transfer, ambulate, lift, carry, reach, complete ADLs   Min Type Additional Details   15 [x] Estim: []Att   [x]Unatt        []TENS instruct                  []IFC  [x]Premod   []NMES                     []Other:  []w/US   []w/ice   [x]w/heat  Position: supine  Location: neck    []  Traction: [] Cervical       []Lumbar                       [] Prone          []Supine                       []Intermittent   []Continuous Lbs:  [] before manual  [] after manual  []w/heat    []  Ultrasound: []Continuous   [] Pulsed at:                            []1MHz   []3MHz Location:  W/cm2:    []  Paraffin         Location:  []w/heat    []  Ice     []  Heat  []  Ice massage Position:  Location:    []  Laser  []  Other: Position:  Location:    []  Vasopneumatic Device Pressure: [] lo [] med [] hi   Temperature:    [x] Skin assessment post-treatment:  [x]intact []redness- no adverse reaction    []redness  adverse reaction:     15 min Therapeutic Exercise:  [x] See flow sheet :   Rationale: increase ROM, increase strength and improve coordination to improve the patients ability to sit, stand, transfer, ambulate, lift, carry, reach, complete ADLs    10 min Manual Therapy:  MFR to B UT,  B levator, Cervical distraction with suboccipital release, cervical PROM to tolerance   Rationale: decrease pain, increase ROM, increase tissue extensibility and decrease trigger points  to improve the patients ability to sit, stand, transfer, ambulate, lift, carry, reach, complete ADLs         With   [x] TE   [] TA   [] neuro   [] other: Patient Education: [x] Review HEP    [] Progressed/Changed HEP based on:   [x] positioning   [x] body mechanics   [] transfers   [x] heat/ice application    [] other:      Pain Level (0-10 scale) post treatment: \"off\" pt denies pain    ASSESSMENT:      [x]  See Plan of [address] 11/10/2017  8:35 AM

## 2017-11-14 ENCOUNTER — TELEPHONE (OUTPATIENT)
Dept: NEUROLOGY | Age: 37
End: 2017-11-14

## 2017-11-14 ENCOUNTER — HOSPITAL ENCOUNTER (OUTPATIENT)
Dept: PHYSICAL THERAPY | Age: 37
Discharge: HOME | End: 2017-11-14
Payer: MEDICAID

## 2017-11-14 PROCEDURE — 97014 ELECTRIC STIMULATION THERAPY: CPT

## 2017-11-14 PROCEDURE — 97110 THERAPEUTIC EXERCISES: CPT

## 2017-11-14 PROCEDURE — 97140 MANUAL THERAPY 1/> REGIONS: CPT

## 2017-11-14 NOTE — PROGRESS NOTES
PT DAILY TREATMENT NOTE 2-15    Patient Name: Maura Altamirano  Date:2017  : 1980  [x]  Patient  Verified  Payor: Sherren Bari / Plan: Cesar Abarca / Product Type: Managed Care Medicaid /    In time:8:30a  Out time:9:45a  Total Treatment Time (min): 75  Visit #: 2     Treatment Area: Left arm pain [M79.602]    SUBJECTIVE  Pain Level (0-10 scale): 0  Any medication changes, allergies to medications, adverse drug reactions, diagnosis change, or new procedure performed?: [] No    [x] Yes (see summary sheet for update)  Subjective functional status/changes:   [] No changes reported  Patient reports she does not have any pain right now, but is very tired. Patient states she was put on medication for seizures and feels like they are becoming more frequent. Patient reports she had 2 seizures this morning and is having difficulty with her vision being a little blurred.     OBJECTIVE    Modality rationale: decrease pain and increase tissue extensibility to improve the patients ability to lift, carry, reach and complete ADL's   Min Type Additional Details   15 [x] Estim: []Att   [x]Unatt        []TENS instruct                  []IFC  [x]Premod   []NMES                     []Other:  []w/US   []w/ice   [x]w/heat  Position:supine  Location:cspine    []  Traction: [] Cervical       []Lumbar                       [] Prone          []Supine                       []Intermittent   []Continuous Lbs:  [] before manual  [] after manual  []w/heat    []  Ultrasound: []Continuous   [] Pulsed at:                            []1MHz   []3MHz Location:  W/cm2:    []  Paraffin         Location:  []w/heat    []  Ice     []  Heat  []  Ice massage Position:  Location:    []  Laser  []  Other: Position:  Location:    []  Vasopneumatic Device Pressure:       [] lo [] med [] hi   Temperature:    [x] Skin assessment post-treatment:  [x]intact []redness- no adverse reaction    []redness  adverse reaction:     50 min Therapeutic Exercise:  [x] See flow sheet :   Rationale: increase ROM and increase strength to improve the patients ability to lift, carry, reach and complete ADL's    10 min Manual Therapy:  MFR UT, levator scap, suboccipitals   Rationale: decrease pain, increase ROM, increase tissue extensibility and decrease trigger points  to improve the patients ability to lift, carry, reach and complete ADL's      With   [x] TE   [] TA   [] neuro   [] other: Patient Education: [x] Review HEP    [x] Progressed/Changed HEP based on:   [x] positioning   [x] body mechanics   [] transfers   [] heat/ice application    [x] other: Discussed with patient talking with MD about frequency of seizures to get medications adjusted. Patient to go after PT today and talk with MD.     Other Objective/Functional Measures: Vitals pre-exercise: BP- 110/82, SpO2- 98%, HR-71     Pain Level (0-10 scale) post treatment: 0    ASSESSMENT/Changes in Function:     Patient will continue to benefit from skilled PT services to modify and progress therapeutic interventions, address functional mobility deficits, address ROM deficits, address strength deficits, analyze and address soft tissue restrictions, analyze and cue movement patterns, analyze and modify body mechanics/ergonomics and assess and modify postural abnormalities to attain remaining goals. []  See Plan of Care  []  See progress note/recertification  []  See Discharge Summary         Progress towards goals / Updated goals:  Patient able to tolerate advanced exercises with no pain throughout. Patient required mod verbal cues for postural awareness in sitting and standing and is making good progress towards goals.      PLAN  [x]  Upgrade activities as tolerated     [x]  Continue plan of care  []  Update interventions per flow sheet       []  Discharge due to:_  []  Other:_      Deacon Vang 11/14/2017  8:42 AM

## 2017-11-14 NOTE — TELEPHONE ENCOUNTER
Had physical therapy. her Pupils dilated, loose control of bowel, had 2 in morning, over the weekend 4 or 5, had topomax 100mg,   Physical therapy advised patient if you can prescribed prednisone for the neck swelling.

## 2017-11-15 DIAGNOSIS — R93.89 ABNORMAL MRI: ICD-10-CM

## 2017-11-15 DIAGNOSIS — G43.919 INTRACTABLE MIGRAINE WITHOUT STATUS MIGRAINOSUS, UNSPECIFIED MIGRAINE TYPE: ICD-10-CM

## 2017-11-15 DIAGNOSIS — H53.9 VISUAL DISTURBANCE: ICD-10-CM

## 2017-11-15 DIAGNOSIS — R40.4 SPELL OF ALTERED CONSCIOUSNESS: ICD-10-CM

## 2017-11-15 RX ORDER — PREDNISONE 10 MG/1
TABLET ORAL
Qty: 42 TAB | Refills: 0 | Status: SHIPPED | OUTPATIENT
Start: 2017-11-15 | End: 2017-11-30

## 2017-11-15 RX ORDER — TOPIRAMATE 100 MG/1
100 TABLET, FILM COATED ORAL 2 TIMES DAILY
Qty: 60 TAB | Refills: 5 | Status: SHIPPED | OUTPATIENT
Start: 2017-11-15 | End: 2018-05-16 | Stop reason: SDUPTHER

## 2017-11-15 NOTE — TELEPHONE ENCOUNTER
Called and spoke to patient. Over the weekend she had 4 or 5 focal seizures. Yesterday morning she had 2 (1am and 4am), then had 1 at physical therapy yesterday and 1 this morning. She is completely drained after the episodes and feels exhausted. She does not think the topamax is strong enough. She wants to know if it should be switched or if dose should be stronger. Her pupils will become dilated and she will zone out. She has lost complete control of her bowels during these episodes and her urine has had a terrible smell. Therapist has cut her exercise down because of low blood pressure. PT states that patient still had a lot of swelling in her neck and needed prednisone from NP to help the swelling go down.      Patient can be reached on her cell phone @ [phone]

## 2017-11-15 NOTE — TELEPHONE ENCOUNTER
Called and spoke to patient. Informed her of NP's response. She states understanding and appreciation. Per NP      Increase topamax to 100 mg BID. Will send a new prescriptio. Will also send a steroid. Her insurance does not allow them to go into the EMU to look at her symptoms further so we can not get her admittied to [facility] we will have to try to treat her accordingly. Increase Topamax first and we can switch or see simone zayas with that. Follow up with me in December for her appt. Call if things continue to progress.    Thanks (Routing comment)

## 2017-11-17 ENCOUNTER — HOSPITAL ENCOUNTER (OUTPATIENT)
Dept: PHYSICAL THERAPY | Age: 37
Discharge: HOME | End: 2017-11-17
Payer: MEDICAID

## 2017-11-17 PROCEDURE — 97140 MANUAL THERAPY 1/> REGIONS: CPT

## 2017-11-17 PROCEDURE — 97110 THERAPEUTIC EXERCISES: CPT

## 2017-11-17 PROCEDURE — 97014 ELECTRIC STIMULATION THERAPY: CPT

## 2017-11-17 NOTE — PROGRESS NOTES
PT DAILY TREATMENT NOTE 2-15    Patient Name: Kelsea Hernandez  Date:2017  : 1980  [x]  Patient  Verified  Payor: Elio Rey / Plan: Kirill Fried / Product Type: Managed Care Medicaid /    In time:8:30a  Out time:9:45a  Total Treatment Time (min): 75  Visit #: 3     Treatment Area: Left arm pain [M79.602]    SUBJECTIVE  Pain Level (0-10 scale): 0  Any medication changes, allergies to medications, adverse drug reactions, diagnosis change, or new procedure performed?: [] No    [x] Yes (see summary sheet for update)  Subjective functional status/changes:   [] No changes reported  Patient reports she talked with her MD's and was told the Zoloft she has been taking has caused the increased episodes of seizures and is now being weaned off of it. Patient states she had 2 seizures this morning, but her vision has not been affected. Patient reports neck feels good today with some clicking still present with cervical AROM.   Patient states she has a migraine this morning    OBJECTIVE    Modality rationale: decrease pain and increase tissue extensibility to improve the patients ability to lift, carry, reach and complete ADL's   Min Type Additional Details   15 [x] Estim: []Att   [x]Unatt        []TENS instruct                  []IFC  [x]Premod   []NMES                     []Other:  []w/US   []w/ice   [x]w/heat  Position:supine  Location:cspine    []  Traction: [] Cervical       []Lumbar                       [] Prone          []Supine                       []Intermittent   []Continuous Lbs:  [] before manual  [] after manual  []w/heat    []  Ultrasound: []Continuous   [] Pulsed at:                            []1MHz   []3MHz Location:  W/cm2:    []  Paraffin         Location:  []w/heat    []  Ice     []  Heat  []  Ice massage Position:  Location:    []  Laser  []  Other: Position:  Location:    []  Vasopneumatic Device Pressure:       [] lo [] med [] hi   Temperature:    [x] Skin assessment post-treatment:  [x]intact []redness- no adverse reaction    []redness  adverse reaction:     50 min Therapeutic Exercise:  [x] See flow sheet :   Rationale: increase ROM and increase strength to improve the patients ability to lift, carry, reach and complete ADL's    10 min Manual Therapy:  MFR UT, levator scap, suboccipitals   Rationale: decrease pain, increase ROM, increase tissue extensibility and decrease trigger points  to improve the patients ability to lift, carry, reach and complete ADL's      With   [x] TE   [] TA   [] neuro   [] other: Patient Education: [x] Review HEP    [x] Progressed/Changed HEP based on:   [x] positioning   [x] body mechanics   [] transfers   [] heat/ice application    [] other:      Other Objective/Functional Measures: pt with no migraine after manual.     Pain Level (0-10 scale) post treatment: 0    ASSESSMENT/Changes in Function:     Patient will continue to benefit from skilled PT services to modify and progress therapeutic interventions, address functional mobility deficits, address ROM deficits, address strength deficits, analyze and address soft tissue restrictions, analyze and cue movement patterns, analyze and modify body mechanics/ergonomics and assess and modify postural abnormalities to attain remaining goals. []  See Plan of Care  []  See progress note/recertification  []  See Discharge Summary         Progress towards goals / Updated goals:   Patient able to tolerate advanced exercises with no pain throughout. Patient required mod verbal cues for postural awareness in sitting and standing and is making good progress towards goals. Patient will do well with continued progression at next visit.     PLAN  [x]  Upgrade activities as tolerated     [x]  Continue plan of care  []  Update interventions per flow sheet       []  Discharge due to:_  []  Other:_      Michelle Grier 11/17/2017  8:42 AM

## 2017-11-21 ENCOUNTER — HOSPITAL ENCOUNTER (OUTPATIENT)
Dept: PHYSICAL THERAPY | Age: 37
Discharge: HOME | End: 2017-11-21
Payer: MEDICAID

## 2017-11-21 ENCOUNTER — TELEPHONE (OUTPATIENT)
Dept: NEUROLOGY | Age: 37
End: 2017-11-21

## 2017-11-21 DIAGNOSIS — M54.2 NECK PAIN: Primary | ICD-10-CM

## 2017-11-21 DIAGNOSIS — R29.898 LEFT ARM WEAKNESS: ICD-10-CM

## 2017-11-21 PROCEDURE — 97110 THERAPEUTIC EXERCISES: CPT

## 2017-11-21 PROCEDURE — 97014 ELECTRIC STIMULATION THERAPY: CPT

## 2017-11-21 PROCEDURE — 97140 MANUAL THERAPY 1/> REGIONS: CPT

## 2017-11-21 RX ORDER — DICLOFENAC SODIUM 75 MG/1
75 TABLET, DELAYED RELEASE ORAL 2 TIMES DAILY
Qty: 60 TAB | Refills: 5 | Status: SHIPPED | OUTPATIENT
Start: 2017-11-21 | End: 2018-11-13 | Stop reason: SDUPTHER

## 2017-11-21 NOTE — PROGRESS NOTES
PT DAILY TREATMENT NOTE 2-15    Patient Name: Kelsea Hernandez  Date:2017  : 1980  [x]  Patient  Verified  Payor: Elio Rey / Plan: [facility] MEDICAID / Product Type: Managed Care Medicaid /    In time:8:00a  Out time: 9:00a  Total Treatment Time (min): 60  Visit #: 4     Treatment Area: Left arm pain [M79.602]    SUBJECTIVE  Pain Level (0-10 scale): 0  Any medication changes, allergies to medications, adverse drug reactions, diagnosis change, or new procedure performed?: [] No    [x] Yes (see summary sheet for update)  Subjective functional status/changes:   [] No changes reported  Patient reports she was under a lot of stress on  and had 4 episodes of seizures, one on Saturday. Patient reports her neck has been feeling good and feels like it gets less tight everyday.      OBJECTIVE    Modality rationale: decrease pain and increase tissue extensibility to improve the patients ability to lift, carry, reach and complete ADL's   Min Type Additional Details   15 [x] Estim: []Att   [x]Unatt        []TENS instruct                  []IFC  [x]Premod   []NMES                     []Other:  []w/US   []w/ice   [x]w/heat  Position:supine  Location:cspine    []  Traction: [] Cervical       []Lumbar                       [] Prone          []Supine                       []Intermittent   []Continuous Lbs:  [] before manual  [] after manual  []w/heat    []  Ultrasound: []Continuous   [] Pulsed at:                            []1MHz   []3MHz Location:  W/cm2:    []  Paraffin         Location:  []w/heat    []  Ice     []  Heat  []  Ice massage Position:  Location:    []  Laser  []  Other: Position:  Location:    []  Vasopneumatic Device Pressure:       [] lo [] med [] hi   Temperature:    [x] Skin assessment post-treatment:  [x]intact []redness- no adverse reaction    []redness  adverse reaction:     35 min Therapeutic Exercise:  [x] See flow sheet :   Rationale: increase ROM and increase strength to improve the patients ability to lift, carry, reach and complete ADL's    10 min Manual Therapy:  MFR UT, levator scap, suboccipitals   Rationale: decrease pain, increase ROM, increase tissue extensibility and decrease trigger points  to improve the patients ability to lift, carry, reach and complete ADL's      With   [x] TE   [] TA   [] neuro   [] other: Patient Education: [x] Review HEP    [x] Progressed/Changed HEP based on:   [x] positioning   [x] body mechanics   [] transfers   [] heat/ice application    [] other:      Other Objective/Functional Measures: no pain with interventions     Pain Level (0-10 scale) post treatment: 0    ASSESSMENT/Changes in Function:     Patient will continue to benefit from skilled PT services to modify and progress therapeutic interventions, address functional mobility deficits, address ROM deficits, address strength deficits, analyze and address soft tissue restrictions, analyze and cue movement patterns, analyze and modify body mechanics/ergonomics and assess and modify postural abnormalities to attain remaining goals. []  See Plan of Care  []  See progress note/recertification  []  See Discharge Summary         Progress towards goals / Updated goals:   Patient able to tolerate advanced exercises with no pain throughout. Patient required mod verbal cues for postural awareness in sitting and standing and is making good progress towards goals. Short Term Goals: To be accomplished in 4 weeks:  1) The patient will be independent with introductory HEP Met  2) The patient will improve L shoulder flexion AROM to 120 deg to improve ease with reaching tasks. Progressing  3) The patient will improve L cervical rotation AROM to 45 deg to improve ease with driving Progressing  Long Term Goals:  To be accomplished in 12 weeks:  1) The patient will be independent with finalized HEP  2) The patient will demonstrate pain free shoulder AROM WFL to improve ease with household chores  3) The patient will demonstrate pain free cervcial AROM WFL to improve ease with  duties  Frequency / Duration: Patient to be seen 2 times per week for 12 weeks.     PLAN  [x]  Upgrade activities as tolerated     [x]  Continue plan of care  []  Update interventions per flow sheet       []  Discharge due to:_  []  Other:_      Sara Ortega 11/21/2017  8:42 AM

## 2017-11-21 NOTE — TELEPHONE ENCOUNTER
Called and spoke to patient. She just went to physical therapy this morning and is currently going down on the prednisone. The swelling in her neck is better but physical therapist is worried it is going to get worse when she completes the steroid. Patient has no feeling down her left arm and is now dropping things out of her left hand. She had another seizure today after a migraine started. Patient goes back to physical therapy 11/28/17, and is continuing to do the exercises at home. She would like to know what NP would suggest; if she should continue to take more prednisone or something else.

## 2017-11-21 NOTE — TELEPHONE ENCOUNTER
Attempted to contact patient. Left message on voicemail for return call. Per NP:  Dr. Yuridia Laws put in referral         Diclofenac 75 mg tablets BID   Do not take any other ibuprofen or advil with this. Can take tylenol.      Take with a meal  (Routing comment)

## 2017-11-21 NOTE — TELEPHONE ENCOUNTER
----- Message from Jian Sam sent at 11/21/2017 11:46 AM EST -----  Regarding: /Telephone  Pt stated that [facility] was concerned that the swollen over the holidays because she does not come back until 12/28/17. Best contact number is [phone].

## 2017-11-21 NOTE — TELEPHONE ENCOUNTER
Called and spoke to patient. Patient is okay with both. She would like any medication be sent to [facility] on [address]    Per NP:      We can start some anti infmallatory like diclofenac tablets to help with longer acting inflammation and can send to  Orthopedic surgery to evaluate her spine further looking at injections vs. Surgical intervention is she ok with ths?    Thanks  (Routing comment)

## 2017-11-22 ENCOUNTER — TELEPHONE (OUTPATIENT)
Dept: NEUROLOGY | Age: 37
End: 2017-11-22

## 2017-11-22 NOTE — TELEPHONE ENCOUNTER
Called and spoke to patient. Gave her name, address, and phone number of Dr. Walker Marques at [facility]. Patient states understanding. Informed patient NP sent medication diclofenac to pharmacy. And reinforced that she cannot take with ibuprofen and advil.  Patient states understanding

## 2017-11-26 ENCOUNTER — APPOINTMENT (OUTPATIENT)
Dept: CT IMAGING | Age: 37
End: 2017-11-26
Attending: EMERGENCY MEDICINE
Payer: COMMERCIAL

## 2017-11-26 ENCOUNTER — HOSPITAL ENCOUNTER (EMERGENCY)
Age: 37
Discharge: HOME | End: 2017-11-26
Attending: EMERGENCY MEDICINE
Payer: COMMERCIAL

## 2017-11-26 VITALS
DIASTOLIC BLOOD PRESSURE: 77 MMHG | SYSTOLIC BLOOD PRESSURE: 107 MMHG | TEMPERATURE: 97.9 F | HEIGHT: 62 IN | WEIGHT: 180 LBS | OXYGEN SATURATION: 96 % | RESPIRATION RATE: 17 BRPM | BODY MASS INDEX: 33.13 KG/M2 | HEART RATE: 83 BPM

## 2017-11-26 DIAGNOSIS — S01.01XA LACERATION OF SCALP, INITIAL ENCOUNTER: ICD-10-CM

## 2017-11-26 DIAGNOSIS — R56.9 SEIZURE (HCC): Primary | ICD-10-CM

## 2017-11-26 DIAGNOSIS — Z78.9 ALCOHOL USE: ICD-10-CM

## 2017-11-26 LAB
ALBUMIN SERPL-MCNC: 3.6 G/DL (ref 3.5–5)
ALBUMIN/GLOB SERPL: 1 {RATIO} (ref 1.1–2.2)
ALP SERPL-CCNC: 78 U/L (ref 45–117)
ALT SERPL-CCNC: 34 U/L (ref 12–78)
AMPHET UR QL SCN: NEGATIVE
ANION GAP SERPL CALC-SCNC: 10 MMOL/L (ref 5–15)
APPEARANCE UR: CLEAR
AST SERPL-CCNC: 18 U/L (ref 15–37)
BACTERIA URNS QL MICRO: NEGATIVE /HPF
BARBITURATES UR QL SCN: NEGATIVE
BASOPHILS # BLD: 0 K/UL (ref 0–0.1)
BASOPHILS NFR BLD: 0 % (ref 0–1)
BENZODIAZ UR QL: POSITIVE
BILIRUB SERPL-MCNC: 0.1 MG/DL (ref 0.2–1)
BILIRUB UR QL: NEGATIVE
BUN SERPL-MCNC: 9 MG/DL (ref 6–20)
BUN/CREAT SERPL: 10 (ref 12–20)
CALCIUM SERPL-MCNC: 8.1 MG/DL (ref 8.5–10.1)
CANNABINOIDS UR QL SCN: NEGATIVE
CHLORIDE SERPL-SCNC: 108 MMOL/L (ref 97–108)
CO2 SERPL-SCNC: 26 MMOL/L (ref 21–32)
COCAINE UR QL SCN: NEGATIVE
COLOR UR: NORMAL
CREAT SERPL-MCNC: 0.89 MG/DL (ref 0.55–1.02)
DIFFERENTIAL METHOD BLD: ABNORMAL
DRUG SCRN COMMENT,DRGCM: ABNORMAL
EOSINOPHIL # BLD: 0.5 K/UL (ref 0–0.4)
EOSINOPHIL NFR BLD: 3 % (ref 0–7)
EPITH CASTS URNS QL MICRO: NORMAL /LPF
ERYTHROCYTE [DISTWIDTH] IN BLOOD BY AUTOMATED COUNT: 13.2 % (ref 11.5–14.5)
ETHANOL SERPL-MCNC: 185 MG/DL
GLOBULIN SER CALC-MCNC: 3.5 G/DL (ref 2–4)
GLUCOSE SERPL-MCNC: 90 MG/DL (ref 65–100)
GLUCOSE UR STRIP.AUTO-MCNC: NEGATIVE MG/DL
HCG UR QL: NEGATIVE
HCT VFR BLD AUTO: 43.8 % (ref 35–47)
HGB BLD-MCNC: 14.7 G/DL (ref 11.5–16)
HGB UR QL STRIP: NEGATIVE
HYALINE CASTS URNS QL MICRO: NORMAL /LPF (ref 0–5)
KETONES UR QL STRIP.AUTO: NEGATIVE MG/DL
LEUKOCYTE ESTERASE UR QL STRIP.AUTO: NEGATIVE
LYMPHOCYTES # BLD: 5.9 K/UL (ref 0.8–3.5)
LYMPHOCYTES NFR BLD: 39 % (ref 12–49)
MCH RBC QN AUTO: 32.5 PG (ref 26–34)
MCHC RBC AUTO-ENTMCNC: 33.6 G/DL (ref 30–36.5)
MCV RBC AUTO: 96.7 FL (ref 80–99)
METHADONE UR QL: NEGATIVE
MONOCYTES # BLD: 0.9 K/UL (ref 0–1)
MONOCYTES NFR BLD: 6 % (ref 5–13)
NEUTS SEG # BLD: 7.9 K/UL (ref 1.8–8)
NEUTS SEG NFR BLD: 52 % (ref 32–75)
NITRITE UR QL STRIP.AUTO: NEGATIVE
OPIATES UR QL: NEGATIVE
PCP UR QL: NEGATIVE
PH UR STRIP: 6.5 [PH] (ref 5–8)
PLATELET # BLD AUTO: 313 K/UL (ref 150–400)
POTASSIUM SERPL-SCNC: 3.3 MMOL/L (ref 3.5–5.1)
PROT SERPL-MCNC: 7.1 G/DL (ref 6.4–8.2)
PROT UR STRIP-MCNC: NEGATIVE MG/DL
RBC # BLD AUTO: 4.53 M/UL (ref 3.8–5.2)
RBC #/AREA URNS HPF: NORMAL /HPF (ref 0–5)
RBC MORPH BLD: ABNORMAL
SODIUM SERPL-SCNC: 144 MMOL/L (ref 136–145)
SP GR UR REFRACTOMETRY: <1.005 (ref 1–1.03)
UR CULT HOLD, URHOLD: NORMAL
UROBILINOGEN UR QL STRIP.AUTO: 0.2 EU/DL (ref 0.2–1)
WBC # BLD AUTO: 15.2 K/UL (ref 3.6–11)
WBC URNS QL MICRO: NORMAL /HPF (ref 0–4)

## 2017-11-26 PROCEDURE — 36415 COLL VENOUS BLD VENIPUNCTURE: CPT | Performed by: EMERGENCY MEDICINE

## 2017-11-26 PROCEDURE — 70450 CT HEAD/BRAIN W/O DYE: CPT

## 2017-11-26 PROCEDURE — 85025 COMPLETE CBC W/AUTO DIFF WBC: CPT | Performed by: EMERGENCY MEDICINE

## 2017-11-26 PROCEDURE — 90715 TDAP VACCINE 7 YRS/> IM: CPT | Performed by: EMERGENCY MEDICINE

## 2017-11-26 PROCEDURE — 93005 ELECTROCARDIOGRAM TRACING: CPT

## 2017-11-26 PROCEDURE — 74011250636 HC RX REV CODE- 250/636: Performed by: EMERGENCY MEDICINE

## 2017-11-26 PROCEDURE — 94762 N-INVAS EAR/PLS OXIMTRY CONT: CPT

## 2017-11-26 PROCEDURE — 80307 DRUG TEST PRSMV CHEM ANLYZR: CPT | Performed by: EMERGENCY MEDICINE

## 2017-11-26 PROCEDURE — 80201 ASSAY OF TOPIRAMATE: CPT | Performed by: EMERGENCY MEDICINE

## 2017-11-26 PROCEDURE — 81001 URINALYSIS AUTO W/SCOPE: CPT | Performed by: EMERGENCY MEDICINE

## 2017-11-26 PROCEDURE — 90471 IMMUNIZATION ADMIN: CPT

## 2017-11-26 PROCEDURE — 99285 EMERGENCY DEPT VISIT HI MDM: CPT

## 2017-11-26 PROCEDURE — 81025 URINE PREGNANCY TEST: CPT

## 2017-11-26 PROCEDURE — 80053 COMPREHEN METABOLIC PANEL: CPT | Performed by: EMERGENCY MEDICINE

## 2017-11-26 PROCEDURE — 74011250637 HC RX REV CODE- 250/637: Performed by: EMERGENCY MEDICINE

## 2017-11-26 PROCEDURE — 75810000293 HC SIMP/SUPERF WND  RPR

## 2017-11-26 PROCEDURE — 77030008460 HC STPLR SKN PRECIS 3M -A

## 2017-11-26 PROCEDURE — 74011000250 HC RX REV CODE- 250: Performed by: EMERGENCY MEDICINE

## 2017-11-26 RX ORDER — SODIUM CHLORIDE 0.9 % (FLUSH) 0.9 %
5-10 SYRINGE (ML) INJECTION AS NEEDED
Status: DISCONTINUED | OUTPATIENT
Start: 2017-11-26 | End: 2017-11-27 | Stop reason: HOSPADM

## 2017-11-26 RX ORDER — SODIUM CHLORIDE 0.9 % (FLUSH) 0.9 %
5-10 SYRINGE (ML) INJECTION EVERY 8 HOURS
Status: DISCONTINUED | OUTPATIENT
Start: 2017-11-26 | End: 2017-11-27 | Stop reason: HOSPADM

## 2017-11-26 RX ORDER — TOPIRAMATE 100 MG/1
100 TABLET, FILM COATED ORAL
Status: COMPLETED | OUTPATIENT
Start: 2017-11-26 | End: 2017-11-26

## 2017-11-26 RX ADMIN — TOPIRAMATE 100 MG: 100 TABLET, FILM COATED ORAL at 23:11

## 2017-11-26 RX ADMIN — TETANUS TOXOID, REDUCED DIPHTHERIA TOXOID AND ACELLULAR PERTUSSIS VACCINE, ADSORBED 0.5 ML: 5; 2.5; 8; 8; 2.5 SUSPENSION INTRAMUSCULAR at 23:05

## 2017-11-26 RX ADMIN — Medication 2 ML: at 22:07

## 2017-11-26 RX ADMIN — BACITRACIN ZINC, NEOMYCIN SULFATE, POLYMYXIN B SULFATE 1 PACKET: 3.5; 5000; 4 OINTMENT TOPICAL at 23:04

## 2017-11-27 LAB
ATRIAL RATE: 76 BPM
CALCULATED P AXIS, ECG09: 17 DEGREES
CALCULATED R AXIS, ECG10: 48 DEGREES
CALCULATED T AXIS, ECG11: 30 DEGREES
DIAGNOSIS, 93000: NORMAL
P-R INTERVAL, ECG05: 138 MS
Q-T INTERVAL, ECG07: 406 MS
QRS DURATION, ECG06: 86 MS
QTC CALCULATION (BEZET), ECG08: 456 MS
VENTRICULAR RATE, ECG03: 76 BPM

## 2017-11-27 NOTE — ED TRIAGE NOTES
Pt arrives complaining of seizure x 2 , per ems none witnessed by EMS pt has been drinking ETOH tonight. Pt speech is slurred.   PT is awake and alert x4

## 2017-11-27 NOTE — DISCHARGE INSTRUCTIONS
We hope that we have addressed all of your medical concerns. The examination and treatment you received in the Emergency Department were for an emergent problem and were not intended as complete care. It is important that you follow up with your healthcare provider(s) for ongoing care. If your symptoms worsen or do not improve as expected, and you are unable to reach your usual health care provider(s), you should return to the Emergency Department. Today's healthcare is undergoing tremendous change, and patient satisfaction surveys are one of the many tools to assess the quality of medical care. You may receive a survey from the [employer] regarding your experience in the Emergency Department. I hope that your experience has been completely positive, particularly the medical care that I provided. As such, please participate in the survey; anything less than excellent does not meet my expectations or intentions. [facility] and [address] [place] [address] participate in nationally recognized quality of care measures. If your blood pressure is greater than 120/80, as reported below, we urge that you seek medical care to address the potential of high blood pressure, commonly known as hypertension. Hypertension can be hereditary or can be caused by certain medical conditions, pain, stress, or \"white coat syndrome. \"       Please make an appointment with your health care provider(s) for follow up of your Emergency Department visit. VITALS:   Patient Vitals for the past 8 hrs:   Temp Pulse Resp BP SpO2   11/26/17 2142 97.9 °F (36.6 °C) 90 15 122/77 97 %          Thank you for allowing us to provide you with medical care today. We realize that you have many choices for your emergency care needs. Please choose us in the future for any continued health care needs. Dylon Smith, 16 [facility] [place]. Office: [phone]            Recent Results (from the past 24 hour(s))   CBC WITH AUTOMATED DIFF    Collection Time: 11/26/17  9:52 PM   Result Value Ref Range    WBC 15.2 (H) 3.6 - 11.0 K/uL    RBC 4.53 3.80 - 5.20 M/uL    HGB 14.7 11.5 - 16.0 g/dL    HCT 43.8 35.0 - 47.0 %    MCV 96.7 80.0 - 99.0 FL    MCH 32.5 26.0 - 34.0 PG    MCHC 33.6 30.0 - 36.5 g/dL    RDW 13.2 11.5 - 14.5 %    PLATELET 161 391 - 846 K/uL    NEUTROPHILS 52 32 - 75 %    LYMPHOCYTES 39 12 - 49 %    MONOCYTES 6 5 - 13 %    EOSINOPHILS 3 0 - 7 %    BASOPHILS 0 0 - 1 %    ABS. NEUTROPHILS 7.9 1.8 - 8.0 K/UL    ABS. LYMPHOCYTES 5.9 (H) 0.8 - 3.5 K/UL    ABS. MONOCYTES 0.9 0.0 - 1.0 K/UL    ABS. EOSINOPHILS 0.5 (H) 0.0 - 0.4 K/UL    ABS. BASOPHILS 0.0 0.0 - 0.1 K/UL    DF SMEAR SCANNED      RBC COMMENTS NORMOCYTIC, NORMOCHROMIC     METABOLIC PANEL, COMPREHENSIVE    Collection Time: 11/26/17  9:52 PM   Result Value Ref Range    Sodium 144 136 - 145 mmol/L    Potassium 3.3 (L) 3.5 - 5.1 mmol/L    Chloride 108 97 - 108 mmol/L    CO2 26 21 - 32 mmol/L    Anion gap 10 5 - 15 mmol/L    Glucose 90 65 - 100 mg/dL    BUN 9 6 - 20 MG/DL    Creatinine 0.89 0.55 - 1.02 MG/DL    BUN/Creatinine ratio 10 (L) 12 - 20      GFR est AA >60 >60 ml/min/1.73m2    GFR est non-AA >60 >60 ml/min/1.73m2    Calcium 8.1 (L) 8.5 - 10.1 MG/DL    Bilirubin, total 0.1 (L) 0.2 - 1.0 MG/DL    ALT (SGPT) 34 12 - 78 U/L    AST (SGOT) 18 15 - 37 U/L    Alk.  phosphatase 78 45 - 117 U/L    Protein, total 7.1 6.4 - 8.2 g/dL    Albumin 3.6 3.5 - 5.0 g/dL    Globulin 3.5 2.0 - 4.0 g/dL    A-G Ratio 1.0 (L) 1.1 - 2.2     ETHYL ALCOHOL    Collection Time: 11/26/17  9:52 PM   Result Value Ref Range    ALCOHOL(ETHYL),SERUM 185 (H) <10 MG/DL   URINALYSIS W/MICROSCOPIC    Collection Time: 11/26/17  9:52 PM   Result Value Ref Range    Color YELLOW/STRAW      Appearance CLEAR CLEAR      Specific gravity <1.005 1.003 - 1.030    pH (UA) 6.5 5.0 - 8.0      Protein NEGATIVE  NEG mg/dL    Glucose NEGATIVE  NEG mg/dL    Ketone NEGATIVE  NEG mg/dL    Bilirubin NEGATIVE  NEG      Blood NEGATIVE  NEG      Urobilinogen 0.2 0.2 - 1.0 EU/dL    Nitrites NEGATIVE  NEG      Leukocyte Esterase NEGATIVE  NEG      WBC 0-4 0 - 4 /hpf    RBC 0-5 0 - 5 /hpf    Epithelial cells FEW FEW /lpf    Bacteria NEGATIVE  NEG /hpf    Hyaline cast 0-2 0 - 5 /lpf   URINE CULTURE HOLD SAMPLE    Collection Time: 11/26/17  9:52 PM   Result Value Ref Range    Urine culture hold URINE ON HOLD IN MICROBIOLOGY DEPT FOR 3 DAYS     DRUG SCREEN, URINE    Collection Time: 11/26/17  9:52 PM   Result Value Ref Range    AMPHETAMINES NEGATIVE  NEG      BARBITURATES NEGATIVE  NEG      BENZODIAZEPINES POSITIVE (A) NEG      COCAINE NEGATIVE  NEG      METHADONE NEGATIVE  NEG      OPIATES NEGATIVE  NEG      PCP(PHENCYCLIDINE) NEGATIVE  NEG      THC (TH-CANNABINOL) NEGATIVE  NEG      Drug screen comment (NOTE)    HCG URINE, QL. - POC    Collection Time: 11/26/17 10:17 PM   Result Value Ref Range    Pregnancy test,urine (POC) NEGATIVE  NEG         Ct Head Wo Cont    Result Date: 11/26/2017  EXAM:  CT HEAD WO CONT INDICATION:   Possible seizures tonight, per patient report. Positive alcohol use. Slurred speech. Laceration to the back of the head. COMPARISON: None. CONTRAST:  None. TECHNIQUE: Unenhanced CT of the head was performed using 5 mm images. Brain and bone windows were generated. CT dose reduction was achieved through use of a standardized protocol tailored for this examination and automatic exposure control for dose modulation. FINDINGS: The ventricles and sulci are normal in size, shape and configuration and midline. There is no significant white matter disease. There is no intracranial hemorrhage, extra-axial collection, mass, mass effect or midline shift. The basilar cisterns are open. No acute infarct is identified. The bone windows demonstrate no abnormalities. The visualized portions of the paranasal sinuses and mastoid air cells are clear. There is a small laceration along the posterior aspect of the central parietal lobe. IMPRESSION: No acute intracranial process             Seizure: Care Instructions  Your Care Instructions    Seizures are caused by abnormal patterns of electrical signals in the brain. They are different for each person. Seizures can affect movement, speech, vision, or awareness. Some people have only slight shaking of a hand and do not pass out. Other people may pass out and have violent shaking of the whole body. Some people appear to stare into space. They are awake, but they can't respond normally. Later, they may not remember what happened. You may need tests to identify the type and cause of the seizures. A seizure may occur only once, or you may have them more than one time. Taking medicines as directed and following up with your doctor may help keep you from having more seizures. The doctor has checked you carefully, but problems can develop later. If you notice any problems or new symptoms, get medical treatment right away. Follow-up care is a key part of your treatment and safety. Be sure to make and go to all appointments, and call your doctor if you are having problems. It's also a good idea to know your test results and keep a list of the medicines you take. How can you care for yourself at home? · Be safe with medicines. Take your medicines exactly as prescribed. Call your doctor if you think you are having a problem with your medicine. · Do not do any activity that could be dangerous to you or others until your doctor says it is safe to do so. For example, do not drive a car, operate machinery, swim, or climb ladders. · Be sure that anyone treating you for any health problem knows that you have had a seizure and what medicines you are taking for it. · Identify and avoid things that may make you more likely to have a seizure.  These may include lack of sleep, alcohol or drug use, stress, or not eating. · Make sure you go to your follow-up appointment. When should you call for help? Call 911 anytime you think you may need emergency care. For example, call if:  ? · You have another seizure. ? · You have more than one seizure in 24 hours. ? · You have new symptoms, such as trouble walking, speaking, or thinking clearly. ?Call your doctor now or seek immediate medical care if:  ? · You are not acting normally. ? Watch closely for changes in your health, and be sure to contact your doctor if you have any problems. Where can you learn more? Go to http://irene-jing.info/. Enter A799 in the search box to learn more about \"Seizure: Care Instructions. \"  Current as of: October 14, 2016  Content Version: 11.4  © 9025-3565 [employer]. Care instructions adapted under license by [employer] (which disclaims liability or warranty for this information). If you have questions about a medical condition or this instruction, always ask your healthcare professional. [place] [address] any warranty or liability for your use of this information. Cuts Closed With Staples: Care Instructions  Your Care Instructions  A cut can happen anywhere on your body. The doctor used staples to close the cut. Staples easily and quickly close a cut, which helps the cut heal.  Sometimes a cut can injure tendons, blood vessels, or nerves. If the cut went deep and through the skin, the doctor may have put in a layer of stitches below the staples. The deeper layer of stitches brings the deep part of the cut together. These stitches will dissolve and don't need to be removed. The staples in the upper layer are what you see on the cut. You may have a bandage. You will need to have the staples removed, usually in 7 to 14 days. The doctor has checked you carefully, but problems can develop later.  If you notice any problems or new symptoms, get medical treatment right away.  Follow-up care is a key part of your treatment and safety. Be sure to make and go to all appointments, and call your doctor if you are having problems. It's also a good idea to know your test results and keep a list of the medicines you take. How can you care for yourself at home? · Keep the cut dry for the first 24 to 48 hours. After this, you can shower if your doctor okays it. Pat the cut dry. · Don't soak the cut, such as in a bathtub. Your doctor will tell you when it's safe to get the cut wet. · If your doctor told you how to care for your cut, follow your doctor's instructions. If you did not get instructions, follow this general advice:  ¨ After the first 24 to 48 hours, wash around the cut with clean water 2 times a day. Don't use hydrogen peroxide or alcohol, which can slow healing. ¨ You may cover the cut with a thin layer of petroleum jelly, such as Vaseline, and a nonstick bandage. ¨ Apply more petroleum jelly and replace the bandage as needed. · Avoid any activity that could cause your cut to reopen. · Do not remove the staples on your own. Your doctor will tell you when to come back to have the staples removed. · Take pain medicines exactly as directed. ¨ If the doctor gave you a prescription medicine for pain, take it as prescribed. ¨ If you are not taking a prescription pain medicine, ask your doctor if you can take an over-the-counter medicine. When should you call for help? Call your doctor now or seek immediate medical care if:  ? · You have new pain, or your pain gets worse. ? · The skin near the cut is cold or pale or changes color. ? · You have tingling, weakness, or numbness near the cut.   ? · The cut starts to bleed, and blood soaks through the bandage. Oozing small amounts of blood is normal.   ? · You have trouble moving the area near the cut.   ? · You have symptoms of infection, such as:  ¨ Increased pain, swelling, warmth, or redness around the cut.   ¨ Red streaks leading from the cut. ¨ Pus draining from the cut. ¨ A fever. ? Watch closely for changes in your health, and be sure to contact your doctor if:  ? · You do not get better as expected. Where can you learn more? Go to http://irene-jing.info/. Enter T610 in the search box to learn more about \"Cuts Closed With Staples: Care Instructions. \"  Current as of: March 20, 2017  Content Version: 11.4  © 4676-4502 [employer]. Care instructions adapted under license by [employer] (which disclaims liability or warranty for this information). If you have questions about a medical condition or this instruction, always ask your healthcare professional. [address] any warranty or liability for your use of this information.

## 2017-11-27 NOTE — ED PROVIDER NOTES
HPI Comments: 44 y.o. female with past medical history significant for seizures, spinal stenosis, who presents via EMS for evaluation of seizure-like activity PTA. Pt states that she was talking to her boyfriend earlier when she \"blinked out\". After this, patient does not have any recollection of what happened. Pt's boyfriend called EMS for seizure like activity, and he reported that patient had two seizures PTA. EMS did not witness either seizure. Pt is awake and alert at this time, but she admits to drinking EtOH earlier today. She is complaining of 10/10 constant posterior head pain at this time, and there is a laceration noted to the right posterior scalp. Pt states that she has been taking her Topamax, which is prescribed by neurology as an anticonvulsant. She reports that she has been having seizures since 09/2017, and is not supposed to be driving. Pt specifically denies any CP, SOB, fevers, nausea, vomiting, urinary incontinence, or tongue abrasions. There are no other acute medical concerns at this time. Social hx: + Tobacco (current smoker), + EtOH (occasional), - Illicit Drug Abuse   PCP: No primary care provider on file. Neurology: [facility]    Due to patient having seizure and alcohol consumption earlier, patient is unsure of what happened. This limits the HPI. Note written by Olesya Junior. Lata Betancourt, as dictated by Mable Adams, DO 9:57 PM     The history is provided by the patient and the EMS personnel. The history is limited by the condition of the patient. No  was used. No past medical history on file. No past surgical history on file. No family history on file. Social History     Social History    Marital status: N/A     Spouse name: N/A    Number of children: N/A    Years of education: N/A     Occupational History    Not on file.      Social History Main Topics    Smoking status: Not on file    Smokeless tobacco: Not on file    Alcohol use Not on file    Drug use: Not on file    Sexual activity: Not on file     Other Topics Concern    Not on file     Social History Narrative     ALLERGIES: Penicillins    Review of Systems   Constitutional: Negative for appetite change, chills, fever and unexpected weight change. HENT: Negative for ear pain, hearing loss, rhinorrhea and trouble swallowing. Eyes: Negative for pain. Respiratory: Negative for cough, chest tightness and shortness of breath. Cardiovascular: Negative for chest pain and palpitations. Gastrointestinal: Negative for abdominal distention, abdominal pain, blood in stool and vomiting. Genitourinary: Negative for dysuria, hematuria and urgency. Musculoskeletal: Negative for back pain and myalgias. Skin: Positive for wound (posterior scalp laceration). Negative for rash. Neurological: Positive for seizures and headaches. Negative for weakness and numbness. Psychiatric/Behavioral: Negative for confusion and suicidal ideas. All other systems reviewed and are negative. Vitals:    11/26/17 2142   BP: 122/77   Pulse: 90   Resp: 15   Temp: 97.9 °F (36.6 °C)   SpO2: 97%   Weight: 81.6 kg (180 lb)   Height: 5' 2\" (1.575 m)            Physical Exam   Constitutional: She is oriented to person, place, and time. She appears well-developed and well-nourished. No distress. HENT:   Head: Normocephalic. Head is with laceration (4 cm laceration on right posterior scalp). Right Ear: External ear normal.   Left Ear: External ear normal.   Nose: Nose normal.   Mouth/Throat: Oropharynx is clear and moist. No oropharyngeal exudate. Eyes: Conjunctivae are normal. Right eye exhibits no discharge. Left eye exhibits no discharge. No scleral icterus. Right eye exhibits nystagmus. Left eye exhibits nystagmus. + mild horizontal (left to right) nystagmus bilaterally  Pupils are equal and round, but sluggish bilaterally   Neck: Normal range of motion. Neck supple. No JVD present. No tracheal deviation present. Cardiovascular: Normal rate, regular rhythm, normal heart sounds and intact distal pulses. Exam reveals no gallop and no friction rub. No murmur heard. Pulmonary/Chest: Effort normal and breath sounds normal. No stridor. No respiratory distress. She has no decreased breath sounds. She has no wheezes. She has no rhonchi. She has no rales. She exhibits no tenderness. Abdominal: Soft. Bowel sounds are normal. She exhibits no distension. There is no tenderness. There is no rebound and no guarding. Musculoskeletal: Normal range of motion. She exhibits no edema or tenderness. Neurological: She is alert and oriented to person, place, and time. She has normal strength and normal reflexes. No cranial nerve deficit or sensory deficit. She exhibits normal muscle tone. Coordination normal. GCS eye subscore is 4. GCS verbal subscore is 5. GCS motor subscore is 6. Skin: Skin is warm and dry. No rash noted. She is not diaphoretic. No erythema. No pallor. Psychiatric: She has a normal mood and affect. Her behavior is normal. Judgment and thought content normal.   Nursing note and vitals reviewed. Note written by Joycelyn Murray. Adin Pichardo, as dictated by Jovany Dubose, DO 9:57 PM.         MDM  Number of Diagnoses or Management Options  Alcohol use:   Laceration of scalp, initial encounter:   Seizure [facility]):      Amount and/or Complexity of Data Reviewed  Clinical lab tests: ordered and reviewed  Tests in the radiology section of CPT®: ordered and reviewed  Tests in the medicine section of CPT®: ordered and reviewed  Obtain history from someone other than the patient: yes  Independent visualization of images, tracings, or specimens: yes (ekg)    Risk of Complications, Morbidity, and/or Mortality  Presenting problems: moderate  Diagnostic procedures: moderate  Management options: moderate    Patient Progress  Patient progress: stable    ED Course       Wound Repair  Date/Time: 11/26/2017 10:46 PM  Performed by: attendingPreparation: skin prepped with Betadine  Location details: scalp  Wound length:2.6 - 7.5 cm (4 cm)    Anesthesia:  Local Anesthetic: LET (lido,epi,tetracaine)  Foreign bodies: no foreign bodies  Debridement: none  Skin closure: staples  Number of sutures: 6 (staples)  Approximation: close  Dressing: antibiotic ointment  Patient tolerance: Patient tolerated the procedure well with no immediate complications  My total time at bedside, performing this procedure was 1-15 minutes. PROGRESS NOTE:  10:58 PM   Discussed with patient about refraining from driving. Will discharge home with PCP and neurology follow-up. Also discussed things that will lower seizure threshold, ie- alcohol. Chief Complaint   Patient presents with    Seizure       The patients presenting problems have been discussed, and they are in agreement with the care plan formulated and outlined with them. I have encouraged them to ask questions as they arise throughout their visit.     MEDICATIONS GIVEN:  Medications   sodium chloride (NS) flush 5-10 mL (not administered)   sodium chloride (NS) flush 5-10 mL (not administered)   lidocaine/EPINEPHrine/tetracaine/methylcellulose (LET) topical gel gel 2 mL (2 mL Topical Given 11/26/17 2207)   diph,Pertuss(AC),Tet Vac-PF (BOOSTRIX) suspension 0.5 mL (0.5 mL IntraMUSCular Given 11/26/17 2305)   topiramate (TOPAMAX) tablet 100 mg (100 mg Oral Given 11/26/17 2311)   neomycin-bacitracnZn-polymyxnB (NEOSPORIN) ointment 1 Packet (1 Packet Topical Given 11/26/17 2304)       LABS REVIEWED:  Recent Results (from the past 24 hour(s))   CBC WITH AUTOMATED DIFF    Collection Time: 11/26/17  9:52 PM   Result Value Ref Range    WBC 15.2 (H) 3.6 - 11.0 K/uL    RBC 4.53 3.80 - 5.20 M/uL    HGB 14.7 11.5 - 16.0 g/dL    HCT 43.8 35.0 - 47.0 %    MCV 96.7 80.0 - 99.0 FL    MCH 32.5 26.0 - 34.0 PG    MCHC 33.6 30.0 - 36.5 g/dL    RDW 13.2 11.5 - 14.5 % PLATELET 147 732 - 955 K/uL    NEUTROPHILS 52 32 - 75 %    LYMPHOCYTES 39 12 - 49 %    MONOCYTES 6 5 - 13 %    EOSINOPHILS 3 0 - 7 %    BASOPHILS 0 0 - 1 %    ABS. NEUTROPHILS 7.9 1.8 - 8.0 K/UL    ABS. LYMPHOCYTES 5.9 (H) 0.8 - 3.5 K/UL    ABS. MONOCYTES 0.9 0.0 - 1.0 K/UL    ABS. EOSINOPHILS 0.5 (H) 0.0 - 0.4 K/UL    ABS. BASOPHILS 0.0 0.0 - 0.1 K/UL    DF SMEAR SCANNED      RBC COMMENTS NORMOCYTIC, NORMOCHROMIC     METABOLIC PANEL, COMPREHENSIVE    Collection Time: 11/26/17  9:52 PM   Result Value Ref Range    Sodium 144 136 - 145 mmol/L    Potassium 3.3 (L) 3.5 - 5.1 mmol/L    Chloride 108 97 - 108 mmol/L    CO2 26 21 - 32 mmol/L    Anion gap 10 5 - 15 mmol/L    Glucose 90 65 - 100 mg/dL    BUN 9 6 - 20 MG/DL    Creatinine 0.89 0.55 - 1.02 MG/DL    BUN/Creatinine ratio 10 (L) 12 - 20      GFR est AA >60 >60 ml/min/1.73m2    GFR est non-AA >60 >60 ml/min/1.73m2    Calcium 8.1 (L) 8.5 - 10.1 MG/DL    Bilirubin, total 0.1 (L) 0.2 - 1.0 MG/DL    ALT (SGPT) 34 12 - 78 U/L    AST (SGOT) 18 15 - 37 U/L    Alk.  phosphatase 78 45 - 117 U/L    Protein, total 7.1 6.4 - 8.2 g/dL    Albumin 3.6 3.5 - 5.0 g/dL    Globulin 3.5 2.0 - 4.0 g/dL    A-G Ratio 1.0 (L) 1.1 - 2.2     ETHYL ALCOHOL    Collection Time: 11/26/17  9:52 PM   Result Value Ref Range    ALCOHOL(ETHYL),SERUM 185 (H) <10 MG/DL   URINALYSIS W/MICROSCOPIC    Collection Time: 11/26/17  9:52 PM   Result Value Ref Range    Color YELLOW/STRAW      Appearance CLEAR CLEAR      Specific gravity <1.005 1.003 - 1.030    pH (UA) 6.5 5.0 - 8.0      Protein NEGATIVE  NEG mg/dL    Glucose NEGATIVE  NEG mg/dL    Ketone NEGATIVE  NEG mg/dL    Bilirubin NEGATIVE  NEG      Blood NEGATIVE  NEG      Urobilinogen 0.2 0.2 - 1.0 EU/dL    Nitrites NEGATIVE  NEG      Leukocyte Esterase NEGATIVE  NEG      WBC 0-4 0 - 4 /hpf    RBC 0-5 0 - 5 /hpf    Epithelial cells FEW FEW /lpf    Bacteria NEGATIVE  NEG /hpf    Hyaline cast 0-2 0 - 5 /lpf   URINE CULTURE HOLD SAMPLE    Collection Time: 11/26/17  9:52 PM   Result Value Ref Range    Urine culture hold URINE ON HOLD IN MICROBIOLOGY DEPT FOR 3 DAYS     DRUG SCREEN, URINE    Collection Time: 11/26/17  9:52 PM   Result Value Ref Range    AMPHETAMINES NEGATIVE  NEG      BARBITURATES NEGATIVE  NEG      BENZODIAZEPINES POSITIVE (A) NEG      COCAINE NEGATIVE  NEG      METHADONE NEGATIVE  NEG      OPIATES NEGATIVE  NEG      PCP(PHENCYCLIDINE) NEGATIVE  NEG      THC (TH-CANNABINOL) NEGATIVE  NEG      Drug screen comment (NOTE)    HCG URINE, QL. - POC    Collection Time: 11/26/17 10:17 PM   Result Value Ref Range    Pregnancy test,urine (POC) NEGATIVE  NEG         VITAL SIGNS:  Patient Vitals for the past 12 hrs:   Temp Pulse Resp BP SpO2   11/26/17 2300 - 83 17 107/77 96 %   11/26/17 2245 - 80 17 110/67 97 %   11/26/17 2215 - 80 11 110/70 95 %   11/26/17 2142 97.9 °F (36.6 °C) 90 15 122/77 97 %       RADIOLOGY RESULTS:  The following have been ordered and reviewed:  CT HEAD WO CONT   Final Result        Study Result      EXAM:  CT HEAD WO CONT     INDICATION:   Possible seizures tonight, per patient report. Positive alcohol  use. Slurred speech. Laceration to the back of the head.     COMPARISON: None.     CONTRAST:  None.     TECHNIQUE: Unenhanced CT of the head was performed using 5 mm images. Brain and  bone windows were generated. CT dose reduction was achieved through use of a  standardized protocol tailored for this examination and automatic exposure  control for dose modulation.       FINDINGS:  The ventricles and sulci are normal in size, shape and configuration and  midline. There is no significant white matter disease. There is no intracranial  hemorrhage, extra-axial collection, mass, mass effect or midline shift. The  basilar cisterns are open. No acute infarct is identified. The bone windows  demonstrate no abnormalities. The visualized portions of the paranasal sinuses  and mastoid air cells are clear.  There is a small laceration along the posterior  aspect of the central parietal lobe.     IMPRESSION  IMPRESSION: No acute intracranial process     ED EKG interpretation:  Rhythm: normal sinus rhythm; and regular . Rate (approx.): 76; Axis: normal; P wave: normal; QRS interval: normal ; ST/T wave: normal; Other findings: normal. This EKG was interpreted by Nicholas Mcmillan DO,ED Provider. PROCEDURES:  Laceration repair    PROGRESS NOTES:  Discussed results and plan with patient and family. Again, pt instructed on no driving. Patient will be discharged home with PCP and neurology followup. Patient instructed to return to the emergency room for any worsening symptoms or any other concerns. DIAGNOSIS:    1. Seizure ([address]    2. Laceration of scalp, initial encounter    3. Alcohol use        PLAN:  Follow-up Information     Follow up With Details Comments [id number] [facility] Pierre Vinson MD Schedule an appointment as soon as possible for a visit  [address] [facility],[address]  [facility] [place] [address] [address]  [phone]      Keren Brunson NP Schedule an appointment as soon as possible for a visit  [address] [facility] [address]      [facility] EMERGENCY DEPT  If symptoms worsen [address] [facility]  [phone]        There are no discharge medications for this patient. ED COURSE: The patients hospital course has been uncomplicated.

## 2017-11-28 ENCOUNTER — TELEPHONE (OUTPATIENT)
Dept: NEUROLOGY | Age: 37
End: 2017-11-28

## 2017-11-28 ENCOUNTER — HOSPITAL ENCOUNTER (OUTPATIENT)
Dept: PHYSICAL THERAPY | Age: 37
Discharge: HOME | End: 2017-11-28
Payer: MEDICAID

## 2017-11-28 LAB — TOPIRAMATE SERPL-MCNC: 3.6 UG/ML (ref 2–25)

## 2017-11-28 PROCEDURE — 97110 THERAPEUTIC EXERCISES: CPT

## 2017-11-28 PROCEDURE — 97140 MANUAL THERAPY 1/> REGIONS: CPT

## 2017-11-28 PROCEDURE — 97014 ELECTRIC STIMULATION THERAPY: CPT

## 2017-11-28 NOTE — PROGRESS NOTES
PT DAILY TREATMENT NOTE 2-15    Patient Name: Yanna Quispe  Date:2017  : 1980  [x]  Patient  Verified  Payor: Parul Ferguson / Plan: [id number] / Product Type: Managed Care Medicaid /    In time:9:00a  Out time: 10:00a  Total Treatment Time (min): 60  Visit #: 5     Treatment Area: Left arm pain [M79.602]    SUBJECTIVE  Pain Level (0-10 scale): 0  Any medication changes, allergies to medications, adverse drug reactions, diagnosis change, or new procedure performed?: [] No    [x] Yes (see summary sheet for update)  Subjective functional status/changes:   [] No changes reported  Patient reports she continues to be under high levels of stress. Patient states she had a seizure and fell. When she fell she hit her head and required staples. Patient states she has been having trouble sleeping and has had numbness down the hands and LLE.     OBJECTIVE    Modality rationale: decrease pain and increase tissue extensibility to improve the patients ability to lift, carry, reach and complete ADL's   Min Type Additional Details   15 [x] Estim: []Att   [x]Unatt        []TENS instruct                  []IFC  [x]Premod   []NMES                     []Other:  []w/US   []w/ice   [x]w/heat  Position:seated  Location:cspine    []  Traction: [] Cervical       []Lumbar                       [] Prone          []Supine                       []Intermittent   []Continuous Lbs:  [] before manual  [] after manual  []w/heat    []  Ultrasound: []Continuous   [] Pulsed at:                            []1MHz   []3MHz Location:  W/cm2:    []  Paraffin         Location:  []w/heat    []  Ice     []  Heat  []  Ice massage Position:  Location:    []  Laser  []  Other: Position:  Location:    []  Vasopneumatic Device Pressure:       [] lo [] med [] hi   Temperature:    [x] Skin assessment post-treatment:  [x]intact []redness- no adverse reaction    []redness  adverse reaction:     35 min Therapeutic Exercise:  [x] See flow sheet :   Rationale: increase ROM and increase strength to improve the patients ability to lift, carry, reach and complete ADL's    10 min Manual Therapy:  MFR UT, levator scap in sitting   Rationale: decrease pain, increase ROM, increase tissue extensibility and decrease trigger points  to improve the patients ability to lift, carry, reach and complete ADL's      With   [x] TE   [] TA   [] neuro   [] other: Patient Education: [x] Review HEP    [x] Progressed/Changed HEP based on:   [x] positioning   [x] body mechanics   [] transfers   [] heat/ice application    [] other:      Other Objective/Functional Measures: no pain with interventions, pt reports the lights are bothering her today while she was on heat and felt like she was going to get a migraine. Pain Level (0-10 scale) post treatment: 0    ASSESSMENT/Changes in Function:     Patient will continue to benefit from skilled PT services to modify and progress therapeutic interventions, address functional mobility deficits, address ROM deficits, address strength deficits, analyze and address soft tissue restrictions, analyze and cue movement patterns, analyze and modify body mechanics/ergonomics and assess and modify postural abnormalities to attain remaining goals. []  See Plan of Care  []  See progress note/recertification  []  See Discharge Summary         Progress towards goals / Updated goals:   Patient demonstrates good tolerance for interventions with verbal cues required for postural awareness and mechanics. Patient making progress towards goals. Short Term Goals: To be accomplished in 4 weeks:  1) The patient will be independent with introductory HEP Met  2) The patient will improve L shoulder flexion AROM to 120 deg to improve ease with reaching tasks. Progressing  3) The patient will improve L cervical rotation AROM to 45 deg to improve ease with driving Progressing  Long Term Goals:  To be accomplished in 12 weeks:  1) The patient will be independent with finalized HEP  2) The patient will demonstrate pain free shoulder AROM WFL to improve ease with household chores  3) The patient will demonstrate pain free cervcial AROM WFL to improve ease with  duties  Frequency / Duration: Patient to be seen 2 times per week for 12 weeks.     PLAN  [x]  Upgrade activities as tolerated     [x]  Continue plan of care  []  Update interventions per flow sheet       []  Discharge due to:_  []  Other:_      Sona Weiss 11/28/2017  8:42 AM

## 2017-11-28 NOTE — TELEPHONE ENCOUNTER
Pt. Called and stated had a seizure over the weekend, went to ER. Had 6 staples. she hit her head. She went to Physical Therapy today, and they want to know if there is a prescription you can give for her head. Please call back pt.

## 2017-11-28 NOTE — TELEPHONE ENCOUNTER
Called and spoke to patient. She would like to try adding the depakote. And she is asking if she could use any cream to put on her head where the staples are to numb it so it doesn't hurt her as bad. Per NP:              We can try another seizure medication to add to the TOpamax but her insurance did not allow an EMU stay so we can not really say what is going on. If she is having these spells frequently she should get to the ED next time and they can admit her and hook her up to EEG to evaluate things further either at [address] [place] [address] Dr. Franklin Plata or [place] [address] [employer] and do somewhat of an admission for EEG monitoring     Otherwise we can try Depakote for her spells she is having     depakote may also help her sleep

## 2017-11-28 NOTE — TELEPHONE ENCOUNTER
Pt called and said she had a seizure and busted her head over the weekend. She had to go to the hospital in the ambulance and she ended up getting 6 staples in her head. They did X-rays and didn't see anything other than a laceration. She also had two seizures while at Physical Therapy. They wanted to know if she should stop PT for now. Also she didn't know if there was any numbing cream could use on her head so she can sleep. She's been having trouble sleeping since it happened.

## 2017-11-29 ENCOUNTER — TELEPHONE (OUTPATIENT)
Dept: NEUROLOGY | Age: 37
End: 2017-11-29

## 2017-11-29 RX ORDER — DIVALPROEX SODIUM 250 MG/1
TABLET, EXTENDED RELEASE ORAL
Qty: 60 TAB | Refills: 5 | Status: SHIPPED | OUTPATIENT
Start: 2017-11-29 | End: 2018-06-27 | Stop reason: SDUPTHER
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
## 2017-11-29 NOTE — TELEPHONE ENCOUNTER
Called and spoke to patient. Informed her of NP's response. She states understanding. Per NP:  Angeline Reynoso to [facility].  For the first week take 1 tablet by mouth nightly. Then after week 1 increase to 2 tablets nightly nad stay here. Can cause sleepiness, dizziness.  She can get OTC numbing spray from [facility] or [facility] they could direct her in the right direction as a topical anesthetic  As in biofreeze or dermablast these can help

## 2017-11-30 ENCOUNTER — HOSPITAL ENCOUNTER (OUTPATIENT)
Age: 37
Setting detail: OBSERVATION
LOS: 1 days | Discharge: HOME | End: 2017-12-01
Attending: EMERGENCY MEDICINE | Admitting: INTERNAL MEDICINE
Payer: MEDICAID

## 2017-11-30 ENCOUNTER — HOSPITAL ENCOUNTER (OUTPATIENT)
Dept: PHYSICAL THERAPY | Age: 37
Discharge: HOME | End: 2017-11-30
Payer: MEDICAID

## 2017-11-30 DIAGNOSIS — R56.9 SEIZURE (HCC): Primary | ICD-10-CM

## 2017-11-30 DIAGNOSIS — R51.9 NONINTRACTABLE HEADACHE, UNSPECIFIED CHRONICITY PATTERN, UNSPECIFIED HEADACHE TYPE: ICD-10-CM

## 2017-11-30 PROBLEM — G40.909 SEIZURE DISORDER (HCC): Status: ACTIVE | Noted: 2017-11-30

## 2017-11-30 PROBLEM — G43.909 MIGRAINE HEADACHE: Status: ACTIVE | Noted: 2017-11-30

## 2017-11-30 LAB
ALBUMIN SERPL-MCNC: 3.4 G/DL (ref 3.5–5)
ALBUMIN/GLOB SERPL: 1 {RATIO} (ref 1.1–2.2)
ALP SERPL-CCNC: 66 U/L (ref 45–117)
ALT SERPL-CCNC: 43 U/L (ref 12–78)
ANION GAP SERPL CALC-SCNC: 10 MMOL/L (ref 5–15)
AST SERPL-CCNC: 24 U/L (ref 15–37)
BASOPHILS # BLD: 0 K/UL (ref 0–0.1)
BASOPHILS NFR BLD: 0 % (ref 0–1)
BILIRUB SERPL-MCNC: 0.2 MG/DL (ref 0.2–1)
BUN SERPL-MCNC: 11 MG/DL (ref 6–20)
BUN/CREAT SERPL: 13 (ref 12–20)
CALCIUM SERPL-MCNC: 9.1 MG/DL (ref 8.5–10.1)
CHLORIDE SERPL-SCNC: 105 MMOL/L (ref 97–108)
CO2 SERPL-SCNC: 24 MMOL/L (ref 21–32)
CREAT SERPL-MCNC: 0.88 MG/DL (ref 0.55–1.02)
DIFFERENTIAL METHOD BLD: NORMAL
EOSINOPHIL # BLD: 0.4 K/UL (ref 0–0.4)
EOSINOPHIL NFR BLD: 4 % (ref 0–7)
ERYTHROCYTE [DISTWIDTH] IN BLOOD BY AUTOMATED COUNT: 12.9 % (ref 11.5–14.5)
GLOBULIN SER CALC-MCNC: 3.4 G/DL (ref 2–4)
GLUCOSE SERPL-MCNC: 89 MG/DL (ref 65–100)
HCG UR QL: NEGATIVE
HCT VFR BLD AUTO: 41 % (ref 35–47)
HGB BLD-MCNC: 13.5 G/DL (ref 11.5–16)
LYMPHOCYTES # BLD: 3.1 K/UL (ref 0.8–3.5)
LYMPHOCYTES NFR BLD: 31 % (ref 12–49)
MCH RBC QN AUTO: 31.9 PG (ref 26–34)
MCHC RBC AUTO-ENTMCNC: 32.9 G/DL (ref 30–36.5)
MCV RBC AUTO: 96.9 FL (ref 80–99)
MONOCYTES # BLD: 0.7 K/UL (ref 0–1)
MONOCYTES NFR BLD: 6 % (ref 5–13)
NEUTS SEG # BLD: 5.9 K/UL (ref 1.8–8)
NEUTS SEG NFR BLD: 59 % (ref 32–75)
PLATELET # BLD AUTO: 258 K/UL (ref 150–400)
POTASSIUM SERPL-SCNC: 3.8 MMOL/L (ref 3.5–5.1)
PROT SERPL-MCNC: 6.8 G/DL (ref 6.4–8.2)
RBC # BLD AUTO: 4.23 M/UL (ref 3.8–5.2)
SODIUM SERPL-SCNC: 139 MMOL/L (ref 136–145)
WBC # BLD AUTO: 10.1 K/UL (ref 3.6–11)

## 2017-11-30 PROCEDURE — 96372 THER/PROPH/DIAG INJ SC/IM: CPT

## 2017-11-30 PROCEDURE — 36415 COLL VENOUS BLD VENIPUNCTURE: CPT | Performed by: EMERGENCY MEDICINE

## 2017-11-30 PROCEDURE — 74011250636 HC RX REV CODE- 250/636: Performed by: EMERGENCY MEDICINE

## 2017-11-30 PROCEDURE — 96361 HYDRATE IV INFUSION ADD-ON: CPT

## 2017-11-30 PROCEDURE — 99285 EMERGENCY DEPT VISIT HI MDM: CPT

## 2017-11-30 PROCEDURE — 96375 TX/PRO/DX INJ NEW DRUG ADDON: CPT

## 2017-11-30 PROCEDURE — 81025 URINE PREGNANCY TEST: CPT

## 2017-11-30 PROCEDURE — 97014 ELECTRIC STIMULATION THERAPY: CPT

## 2017-11-30 PROCEDURE — 97110 THERAPEUTIC EXERCISES: CPT

## 2017-11-30 PROCEDURE — 95813 EEG EXTND MNTR 61-119 MIN: CPT | Performed by: INTERNAL MEDICINE

## 2017-11-30 PROCEDURE — 74011250637 HC RX REV CODE- 250/637: Performed by: INTERNAL MEDICINE

## 2017-11-30 PROCEDURE — 80053 COMPREHEN METABOLIC PANEL: CPT | Performed by: EMERGENCY MEDICINE

## 2017-11-30 PROCEDURE — 85025 COMPLETE CBC W/AUTO DIFF WBC: CPT | Performed by: EMERGENCY MEDICINE

## 2017-11-30 PROCEDURE — 99218 HC RM OBSERVATION: CPT

## 2017-11-30 PROCEDURE — 96374 THER/PROPH/DIAG INJ IV PUSH: CPT

## 2017-11-30 PROCEDURE — 74011250636 HC RX REV CODE- 250/636: Performed by: INTERNAL MEDICINE

## 2017-11-30 PROCEDURE — 97140 MANUAL THERAPY 1/> REGIONS: CPT

## 2017-11-30 RX ORDER — DIAZEPAM 5 MG/1
5 TABLET ORAL
Status: DISCONTINUED | OUTPATIENT
Start: 2017-11-30 | End: 2017-12-01 | Stop reason: HOSPADM

## 2017-11-30 RX ORDER — DIPHENHYDRAMINE HYDROCHLORIDE 50 MG/ML
25 INJECTION, SOLUTION INTRAMUSCULAR; INTRAVENOUS
Status: COMPLETED | OUTPATIENT
Start: 2017-11-30 | End: 2017-11-30

## 2017-11-30 RX ORDER — SERTRALINE HYDROCHLORIDE 50 MG/1
50 TABLET, FILM COATED ORAL DAILY
Status: DISCONTINUED | OUTPATIENT
Start: 2017-12-01 | End: 2017-12-01 | Stop reason: HOSPADM

## 2017-11-30 RX ORDER — SODIUM CHLORIDE 0.9 % (FLUSH) 0.9 %
5-10 SYRINGE (ML) INJECTION AS NEEDED
Status: DISCONTINUED | OUTPATIENT
Start: 2017-11-30 | End: 2017-12-01 | Stop reason: HOSPADM

## 2017-11-30 RX ORDER — ENOXAPARIN SODIUM 100 MG/ML
40 INJECTION SUBCUTANEOUS EVERY 24 HOURS
Status: DISCONTINUED | OUTPATIENT
Start: 2017-11-30 | End: 2017-12-01 | Stop reason: HOSPADM

## 2017-11-30 RX ORDER — DIVALPROEX SODIUM 250 MG/1
250 TABLET, EXTENDED RELEASE ORAL
Status: DISCONTINUED | OUTPATIENT
Start: 2017-11-30 | End: 2017-12-01 | Stop reason: HOSPADM

## 2017-11-30 RX ORDER — DICLOFENAC SODIUM 25 MG/1
75 TABLET, DELAYED RELEASE ORAL 2 TIMES DAILY
Status: DISCONTINUED | OUTPATIENT
Start: 2017-11-30 | End: 2017-12-01 | Stop reason: HOSPADM

## 2017-11-30 RX ORDER — SUMATRIPTAN 25 MG/1
100 TABLET, FILM COATED ORAL
Status: DISCONTINUED | OUTPATIENT
Start: 2017-11-30 | End: 2017-12-01 | Stop reason: HOSPADM

## 2017-11-30 RX ORDER — SODIUM CHLORIDE 0.9 % (FLUSH) 0.9 %
5-10 SYRINGE (ML) INJECTION EVERY 8 HOURS
Status: DISCONTINUED | OUTPATIENT
Start: 2017-11-30 | End: 2017-12-01 | Stop reason: HOSPADM

## 2017-11-30 RX ORDER — PROCHLORPERAZINE EDISYLATE 5 MG/ML
10 INJECTION INTRAMUSCULAR; INTRAVENOUS
Status: COMPLETED | OUTPATIENT
Start: 2017-11-30 | End: 2017-11-30

## 2017-11-30 RX ORDER — DIVALPROEX SODIUM 500 MG/1
500 TABLET, EXTENDED RELEASE ORAL
Status: DISCONTINUED | OUTPATIENT
Start: 2017-11-30 | End: 2017-11-30

## 2017-11-30 RX ORDER — TOPIRAMATE 100 MG/1
100 TABLET, FILM COATED ORAL 2 TIMES DAILY
Status: DISCONTINUED | OUTPATIENT
Start: 2017-11-30 | End: 2017-12-01 | Stop reason: HOSPADM

## 2017-11-30 RX ADMIN — SUMATRIPTAN SUCCINATE 100 MG: 25 TABLET ORAL at 17:49

## 2017-11-30 RX ADMIN — ENOXAPARIN SODIUM 40 MG: 40 INJECTION SUBCUTANEOUS at 17:45

## 2017-11-30 RX ADMIN — DICLOFENAC SODIUM 75 MG: 25 TABLET, DELAYED RELEASE ORAL at 18:35

## 2017-11-30 RX ADMIN — SODIUM CHLORIDE 1000 ML: 900 INJECTION, SOLUTION INTRAVENOUS at 11:05

## 2017-11-30 RX ADMIN — Medication 10 ML: at 16:00

## 2017-11-30 RX ADMIN — TOPIRAMATE 100 MG: 100 TABLET, FILM COATED ORAL at 17:50

## 2017-11-30 RX ADMIN — PROCHLORPERAZINE EDISYLATE 10 MG: 5 INJECTION INTRAMUSCULAR; INTRAVENOUS at 11:10

## 2017-11-30 RX ADMIN — DIPHENHYDRAMINE HYDROCHLORIDE 25 MG: 50 INJECTION, SOLUTION INTRAMUSCULAR; INTRAVENOUS at 11:08

## 2017-11-30 NOTE — PROGRESS NOTES
Patient was discussed with our nurse practitioner, Di Agrawal. This is a lady we have been following for question seizure. She continues to have frequent spells. Her workup to date has been normal.  We have attempted to get her into the epilepsy monitoring unit to capture set spells for accurate diagnosis however her insurance company, [employer], had been denying those admissions. Given that she has had a recurrent event that took her to the emergency department today we recommended her be admitted to [address] [place] [address] and hooked up on a continuous video EEG and attempts to increase the degree of recording and to capture these events so that they may be accurately diagnosed and she can be appropriately treated. Once she arrives at [address] [place] [address] the EEG techs should be contacted and she should be placed on continuous video EEG.     Kiko Banerjee MD

## 2017-11-30 NOTE — IP AVS SNAPSHOT
[address] 
[phone] Patient: Arnulfo Rowell MRN: [id number] TSM:[id number][id number] About your hospitalization You were admitted on:  November 30, 2017 You last received care in the:  [facility] 3 PROG CARE TELE 1 You were discharged on:  December 1, 2017 Why you were hospitalized Your primary diagnosis was:  Transient Alteration Of Awareness Your diagnoses also included:  Anxiety, Migraine Headache Things You Need To Do (next 8 weeks) Follow up with Juliet Fried MD  
  
Phone:  [phone] Where:  [address] [facility],[address], [address] [facility] [address] Friday Dec 15, 2017 Any with Christiano Fraire NP at  2:00 PM  
Where: [address] [facility]) Discharge Orders None A check baltazar indicates which time of day the medication should be taken. My Medications TAKE these medications as instructed Instructions Each Dose to Equal  
 Morning Noon Evening Bedtime  
 diazePAM 5 mg tablet Commonly known as:  VALIUM Your last dose was: Your next dose is:    
   
   
 5 mg three (3) times daily as needed. 5 mg  
    
   
   
   
  
 diclofenac EC 75 mg EC tablet Commonly known as:  VOLTAREN Your last dose was: Your next dose is: Take 1 Tab by mouth two (2) times a day. 75 mg  
    
   
   
   
  
 divalproex  mg ER tablet Commonly known as:  DEPAKOTE ER Your last dose was: Your next dose is: Take 2 tablets by mouth nightly. sertraline 50 mg tablet Commonly known as:  ZOLOFT Your last dose was: Your next dose is:    
   
   
 50 mg daily. 50 mg  
    
   
   
   
  
 * SUMAtriptan 100 mg tablet Commonly known as:  IMITREX Your last dose was: Your next dose is: 1 at HA onset and repeat in 2 hours if needed. Max 2 in 24 hours * SUMAtriptan 100 mg tablet Commonly known as:  IMITREX Your last dose was: Your next dose is:    
   
   
 1 at HA onset and repeat in 2 hours if needed. Max 2 in 24 hours  
     
   
   
   
  
 topiramate 100 mg tablet Commonly known as:  TOPAMAX Your last dose was: Your next dose is: Take 1 Tab by mouth two (2) times a day. 100 mg  
    
   
   
   
  
 triamcinolone acetonide 0.5 % ointment Commonly known as:  KENALOG Your last dose was: Your next dose is: * Notice: This list has 2 medication(s) that are the same as other medications prescribed for you. Read the directions carefully, and ask your doctor or other care provider to review them with you. Discharge Instructions ACUTE DIAGNOSES: 
Seizure ([facility] [address] CHRONIC MEDICAL DIAGNOSES: 
Problem List as of 12/1/2017  Date Reviewed: 12/1/2017 Codes Class Noted - Resolved Migraine headache ICD-10-CM: W12.998 ICD-9-CM: 346.90  11/30/2017 - Present Unresponsive ICD-10-CM: R41.89 ICD-9-CM: 780.09  9/22/2017 - Present Spell of altered consciousness ICD-10-CM: R40.4 ICD-9-CM: 780.09  9/22/2017 - Present * (Principal)Transient alteration of awareness ICD-10-CM: R40.4 ICD-9-CM: 780.02  9/22/2017 - Present Analgesic overuse headache ICD-10-CM: T39.91XA, G44.40 ICD-9-CM: 339.3, E935.9  9/22/2017 - Present Anxiety ICD-10-CM: F41.9 ICD-9-CM: 300.00  Unknown - Present RESOLVED: Headache ICD-10-CM: Y03 ICD-9-CM: 784.0  11/30/2017 - 11/30/2017 DISCHARGE MEDICATIONS:  
 
 
 
· It is important that you take the medication exactly as they are prescribed.   
· Keep your medication in the bottles provided by the pharmacist and keep a list of the medication names, dosages, and times to be taken in your wallet. · Do not take other medications without consulting your doctor. DIET:  Regular Diet ACTIVITY: Activity as tolerated ADDITIONAL INFORMATION: If you experience any of the following symptoms then please call your primary care physician or return to the emergency room if you cannot get hold of your doctor: Fever, chills, nausea, vomiting, diarrhea, change in mentation, falling, bleeding, shortness of breath. FOLLOW UP CARE: 
Dr. Farshad Mcgarry MD  you are to call and set up an appointment to see them in 2 weeks. Follow-up with neurology as needed Information obtained by : 
I understand that if any problems occur once I am at home I am to contact my physician. I understand and acknowledge receipt of the instructions indicated above. Physician's or R.N.'s Signature                                                                  Date/Time Patient or Representative Signature                                                          Date/Time [employer] Announcement We are excited to announce that we are making your provider's discharge notes available to you in EntraTympanic. You will see these notes when they are completed and signed by the physician that discharged you from your recent hospital stay. If you have any questions or concerns about any information you see in EntraTympanic, please call the Health Information Department where you were seen or reach out to your Primary Care Provider for more information about your plan of care. Introducing \[facility] [place] [facility]\"" & HEALTH SERVICES! Dear Evon Mora: 
Thank you for requesting a EntraTym[employer] account.   Our records indicate that you already have an active Cookapp account. You can access your account anytime at [email or web address] Did you know that you can access your hospital and ER discharge instructions at any time in Cookapp? You can also review all of your test results from your hospital stay or ER visit. Additional Information If you have questions, please visit the Frequently Asked Questions section of the Cookapp website at [email or web address] Remember, Cookapp is NOT to be used for urgent needs. For medical emergencies, dial 911. Now available from your iPhone and Android! Unresulted Labs-Please follow up with your PCP about these lab tests Order Current Status TSH 3RD GENERATION Collected (11/30/17 2030) CULTURE, MRSA Preliminary result Providers Seen During Your Hospitalization Provider Specialty Primary office phone Shannon Ryan MD Emergency Medicine [phone] Ivette England MD Hospitalist [phone] Your Primary Care Physician (PCP) Primary Care Physician Office Phone Office Fax Anuj Ciarrar [phone] You are allergic to the following Allergen Reactions Latex Rash Shellfish Derived Anaphylaxis Pcn (Penicillins) Unknown (comments) Tree Nut Swelling Recent Documentation Height Weight BMI OB Status Smoking Status 1.575 m 81.4 kg 32.82 kg/m2 IUD Current Every Day Smoker Emergency Contacts Name Discharge Info Relation Home Work Mobile Franchesca Nayak DISCHARGE CAREGIVER [3] Parent [1] [phone] Haider Nayak DISCHARGE CAREGIVER [3] Father [15] [phone] AndHaider  Parent [1] [phone] Patient Belongings The following personal items are in your possession at time of discharge: 
     Visual Aid: None Discharge Instructions Attachments/References SEIZURE (ENGLISH) Patient Handouts Seizure: Care Instructions Your Care Instructions Seizures are caused by abnormal patterns of electrical signals in the brain. They are different for each person. Seizures can affect movement, speech, vision, or awareness. Some people have only slight shaking of a hand and do not pass out. Other people may pass out and have violent shaking of the whole body. Some people appear to stare into space. They are awake, but they can't respond normally. Later, they may not remember what happened. You may need tests to identify the type and cause of the seizures. A seizure may occur only once, or you may have them more than one time. Taking medicines as directed and following up with your doctor may help keep you from having more seizures. The doctor has checked you carefully, but problems can develop later. If you notice any problems or new symptoms, get medical treatment right away. Follow-up care is a key part of your treatment and safety. Be sure to make and go to all appointments, and call your doctor if you are having problems. It's also a good idea to know your test results and keep a list of the medicines you take. How can you care for yourself at home? · Be safe with medicines. Take your medicines exactly as prescribed. Call your doctor if you think you are having a problem with your medicine. · Do not do any activity that could be dangerous to you or others until your doctor says it is safe to do so. For example, do not drive a car, operate machinery, swim, or climb ladders. · Be sure that anyone treating you for any health problem knows that you have had a seizure and what medicines you are taking for it. · Identify and avoid things that may make you more likely to have a seizure. These may include lack of sleep, alcohol or drug use, stress, or not eating. · Make sure you go to your follow-up appointment. When should you call for help? Call 911 anytime you think you may need emergency care. For example, call if: 
? · You have another seizure. ? · You have more than one seizure in 24 hours. ? · You have new symptoms, such as trouble walking, speaking, or thinking clearly. ?Call your doctor now or seek immediate medical care if: 
? · You are not acting normally. ? Watch closely for changes in your health, and be sure to contact your doctor if you have any problems. Where can you learn more? Go to http://irene-jing.info/. Enter O824 in the search box to learn more about \"Seizure: Care Instructions. \" Current as of: October 14, 2016 Content Version: 11.4 © 2188-9602 [employer]. Care instructions adapted under license by [employer] (which disclaims liability or warranty for this information). If you have questions about a medical condition or this instruction, always ask your healthcare professional. [address] any warranty or liability for your use of this information. Please provide this summary of care documentation to your next provider. Signatures-by signing, you are acknowledging that this After Visit Summary has been reviewed with you and you have received a copy. Patient Signature:  ____________________________________________________________ Date:  ____________________________________________________________  
  
Rowan Sleight Provider Signature:  ____________________________________________________________ Date:  ____________________________________________________________

## 2017-11-30 NOTE — ROUTINE PROCESS
TRANSFER - OUT REPORT:    Verbal report given to GOKUL Crockett RN(name) on Christie Boss  being transferred to [facility] (unit) for routine progression of care       Report consisted of patients Situation, Background, Assessment and   Recommendations(SBAR). Information from the following report(s) SBAR, MAR, Telemetry,Vitals, all test results was reviewed with the receiving nurse. Lines:   Peripheral IV 11/30/17 Right Antecubital (Active)   Site Assessment Clean, dry, & intact 11/30/2017 10:27 AM   Phlebitis Assessment 0 11/30/2017 10:27 AM   Infiltration Assessment 0 11/30/2017 10:27 AM   Dressing Status Clean, dry, & intact 11/30/2017 10:27 AM   Dressing Type Transparent 11/30/2017 10:27 AM   Hub Color/Line Status Pink 11/30/2017 10:27 AM        Opportunity for questions and clarification was provided. Patient transported with: NSL, Cardiac BP RR monitoring. Reassessment at time of transfer pt is stable for transport.

## 2017-11-30 NOTE — CONSULTS
[facility] Neurology  [address] [place] [address]  [phone]     Inpatient Neurology Consult  Katherine Cheney, [facility]    Name:   Milvia Stewart record #: [id number]  Admission Date: 11/30/2017  Consult Date:  11/30/17    Referring Provider: Dr. Ekaterina House  Chief Complaint:  Potential seizure  Source of Hx:  Chart review, pt  _____________________________________________________________________    HISTORY OF PRESENT ILLNESS:   Tommie Felix is a 44 y.o. female with PMH of depression, DM, PTSD, neuropathy, cervical stenosis, fatigue, HA and anxiety. The Neurology Service is asked to evaluate for a potential seizure, after admission for potential seizure earlier in day. Pt is followed by OP neurologist Dr. Annabelle Sinclair and Demar Queen, NP at [facility]. She describes about 3mo ago, she started having moments where she started staring off into space and wasn't responding. She was not aware of these events and family and her male friend (per chart review) told her she was having these events and not responding when people would speak to her. Her friend told Dr. Annabelle Sinclair she could stare off into space for almost 5-6 minutes and not hear anything he was saying and have some confusion after she was able to respond. She has been told  She tells me she can have up to 4 per day, and an average of 13 per week. She states she only has \"focal seizure\" but her maternal grandmother and biological father had other types of seizures where they would stare off then start shaking. It is unclear if someone is with her physically each time she has a seizure like event when she reports at times she has 4 daily. Denies recent falls, weakness, NV, fever, or new medications.       Past Medical History:   Diagnosis Date    Anxiety     Anxiety     Chest pain     Depression     Diabetes (HCC)     Fatigue     Headache     Headache(784.0)     Ill-defined condition     \"cervicle stenosis\"    Memory loss     Neurological disorder     siezures    Neuropathy     PTSD (post-traumatic stress disorder)     Rash     Ringing in ears     Ringing in ears     Skipped beats     Snoring     Visual disturbance      Past Surgical History:   Procedure Laterality Date    HX GYN       Family History   Problem Relation Age of Onset    Headache Mother     Neuropathy Mother     Cancer Father     Seizures Other     Heart Disease Other     Stroke Other     Seizures Maternal Grandmother      Social History     Social History    Marital status: SINGLE     Spouse name: N/A    Number of children: N/A    Years of education: N/A     Occupational History    Not on file. Social History Main Topics    Smoking status: Current Every Day Smoker     Packs/day: 0.50    Smokeless tobacco: Never Used    Alcohol use Yes      Comment: weekends    Drug use: No    Sexual activity: Not on file     Other Topics Concern    Not on file     Social History Narrative       Objective  Allergies: Allergies   Allergen Reactions    Latex Rash    Shellfish Derived Anaphylaxis    Pcn [Penicillins] Unknown (comments)    Tree Nut Swelling     Outpatient Meds  No current facility-administered medications on file prior to encounter. Current Outpatient Prescriptions on File Prior to Encounter   Medication Sig Dispense Refill    divalproex ER (DEPAKOTE ER) 250 mg ER tablet Take 2 tablets by mouth nightly. 60 Tab 5    diclofenac EC (VOLTAREN) 75 mg EC tablet Take 1 Tab by mouth two (2) times a day. 60 Tab 5    topiramate (TOPAMAX) 100 mg tablet Take 1 Tab by mouth two (2) times a day. 60 Tab 5    sertraline (ZOLOFT) 50 mg tablet 50 mg daily.  diazePAM (VALIUM) 5 mg tablet 5 mg three (3) times daily as needed.  triamcinolone acetonide (KENALOG) 0.5 % ointment       SUMAtriptan (IMITREX) 100 mg tablet 1 at HA onset and repeat in 2 hours if needed.   Max 2 in 24 hours 9 Tab 5    SUMAtriptan (IMITREX) 100 mg tablet 1 at HA onset and repeat in 2 hours if needed.   Max 2 in 24 hours 12 Tab 5       Inpatient Meds    Current Facility-Administered Medications:     sodium chloride (NS) flush 5-10 mL, 5-10 mL, IntraVENous, Q8H, Aditya Burciaga MD, 10 mL at 11/30/17 1600    sodium chloride (NS) flush 5-10 mL, 5-10 mL, IntraVENous, PRN, Aditya Burciaga MD    enoxaparin (LOVENOX) injection 40 mg, 40 mg, SubCUTAneous, Q24H, Adiyta Burciaga MD, 40 mg at 11/30/17 1745    diazePAM (VALIUM) tablet 5 mg, 5 mg, Oral, Q8H PRN, Aditya Burciaga MD    diclofenac EC (VOLTAREN) tablet 75 mg, 75 mg, Oral, BID, Aditya Burciaga MD    [START ON 12/1/2017] sertraline (ZOLOFT) tablet 50 mg, 50 mg, Oral, DAILY, Aditya Burciaga MD    SUMAtriptan (IMITREX) tablet 100 mg, 100 mg, Oral, Q8H PRN, Aditya Burciaga MD, 100 mg at 11/30/17 1749    topiramate (TOPAMAX) tablet 100 mg, 100 mg, Oral, BID, Aditya Burciaga MD, 100 mg at 11/30/17 1750    divalproex ER (DEPAKOTE ER) 24 hour tablet 250 mg, 250 mg, Oral, QHS, Aditya Burciaga MD    PHYSICAL EXAM  Patient Vitals for the past 12 hrs:   Temp Pulse Resp BP SpO2   11/30/17 1515 97.6 °F (36.4 °C) 67 16 114/80 99 %   11/30/17 1425 97.5 °F (36.4 °C) 66 14 107/80 97 %   11/30/17 1400 - 69 22 111/74 98 %   11/30/17 1345 - 77 15 107/69 96 %   11/30/17 1230 - 63 13 98/54 95 %   11/30/17 1200 - 67 13 99/48 94 %   11/30/17 1115 - 70 19 104/63 99 %   11/30/17 1045 - 88 19 113/57 97 %   11/30/17 1025 - 70 9 - 99 %   11/30/17 1018 97.6 °F (36.4 °C) 68 12 118/65 98 %        General: WF in NAD, providing decent history    Psych: Affect is calm, cooperative   Neck: supple, nontender,  No bruit   Heart: regular rhythm and rate     Lungs: clear BBS   Extremities: no LE edema Skin: no rashes      Neurological Examination:    Mental Status:  Alert, oriented x 4, Good insight and judgement        Commands: following    Language:  Comprehension:  Intact       Speech: no dysarthria or aphasia     Cranial Nerves: I: smell   Not tested    II: visual acuity    deferred    II: visual fields   Full to confrontation    II: pupils   Equal, round, reactive to light    II: optic disc   Not examined    III,VII:   no ptosis of either eyelid    III,IV,VI: extraocular muscles    Full EOM, no nystagmus, no intranuclear opthalmoplegia    V: mastication   symmetrical    V: facial sensation:    Equal V1, V2 and V3 bilaterally with LT    VII: facial muscle function     Symmetric, no facial droop    VIII: hearing   Equal bilaterally    IX: soft palate elevation    Uvula midline, elevates symetrically    XI: trapezius strength    5/5    XI: sternocleidomastoid strength   5/5    XI: neck flexion strength    5/5     XII: tongue    Protrudes midline, no fasciculations or atrophy      Strength/Motor   Left:   Proximal:    5-/5   Distal:  5 /5    Right:    Proximal:   5 /5   Distal:   5 /5    Drift:       None             Bulk:  appears symmetric         Tone:  normal      Reflexes:    BR Brachial Patellar Achilles Babinski Startle Glabellar   L 2/4+ 2/4+ 2/4+ NT  downgoing NT NT   R 2/4+ 2/4+ 2/4+ NT downgoing NT NT      Sensory: intact on proximal & distal extremity w/ LT, pressure, temp on R   L: reduced sensation with LT, pressure and temp proximally and distally   Coordination: FNF: Intact on R, dysmetric on L    Heel to león:  Intact bilaterally     Tremors:  no resting tremors, no intention tremors   Gait: deferred   *TOPHER :  Unable to assess due to reduced comprehension, agitation or reduced LOC.     Labs Reviewed  Recent Results (from the past 12 hour(s))   CBC WITH AUTOMATED DIFF    Collection Time: 11/30/17 10:28 AM   Result Value Ref Range    WBC 10.1 3.6 - 11.0 K/uL    RBC 4.23 3.80 - 5.20 M/uL    HGB 13.5 11.5 - 16.0 g/dL    HCT 41.0 35.0 - 47.0 %    MCV 96.9 80.0 - 99.0 FL    MCH 31.9 26.0 - 34.0 PG    MCHC 32.9 30.0 - 36.5 g/dL    RDW 12.9 11.5 - 14.5 %    PLATELET 461 753 - 405 K/uL    NEUTROPHILS 59 32 - 75 %    LYMPHOCYTES 31 12 - 49 %    MONOCYTES 6 5 - 13 %    EOSINOPHILS 4 0 - 7 %    BASOPHILS 0 0 - 1 %    ABS. NEUTROPHILS 5.9 1.8 - 8.0 K/UL    ABS. LYMPHOCYTES 3.1 0.8 - 3.5 K/UL    ABS. MONOCYTES 0.7 0.0 - 1.0 K/UL    ABS. EOSINOPHILS 0.4 0.0 - 0.4 K/UL    ABS. BASOPHILS 0.0 0.0 - 0.1 K/UL    DF AUTOMATED     METABOLIC PANEL, COMPREHENSIVE    Collection Time: 11/30/17 10:28 AM   Result Value Ref Range    Sodium 139 136 - 145 mmol/L    Potassium 3.8 3.5 - 5.1 mmol/L    Chloride 105 97 - 108 mmol/L    CO2 24 21 - 32 mmol/L    Anion gap 10 5 - 15 mmol/L    Glucose 89 65 - 100 mg/dL    BUN 11 6 - 20 MG/DL    Creatinine 0.88 0.55 - 1.02 MG/DL    BUN/Creatinine ratio 13 12 - 20      GFR est AA >60 >60 ml/min/1.73m2    GFR est non-AA >60 >60 ml/min/1.73m2    Calcium 9.1 8.5 - 10.1 MG/DL    Bilirubin, total 0.2 0.2 - 1.0 MG/DL    ALT (SGPT) 43 12 - 78 U/L    AST (SGOT) 24 15 - 37 U/L    Alk. phosphatase 66 45 - 117 U/L    Protein, total 6.8 6.4 - 8.2 g/dL    Albumin 3.4 (L) 3.5 - 5.0 g/dL    Globulin 3.4 2.0 - 4.0 g/dL    A-G Ratio 1.0 (L) 1.1 - 2.2     HCG URINE, QL. - POC    Collection Time: 11/30/17 11:12 AM   Result Value Ref Range    Pregnancy test,urine (POC) NEGATIVE  NEG       Imaging  Reviewed:   CT Results (recent):    Results from Hospital Encounter encounter on 02/21/16   CT ABD PELV W CONT   Narrative **Final Report**      ICD Codes / Adm. Diagnosis: 791151   / Abdominal Pain  appendicitis  Examination:  CT ABD PELVIS W CON  - [id number] - Feb 21 2016  7:46PM  Accession No:  [id number]  Reason:  r/o appy - labs patient first - neg hcg/Cr      REPORT:  EXAM: CT ABDOMEN PELVIS WITH CONTRAST    INDICATION:  Abdominal pain and appendicitis. COMPARISON: None. CONTRAST: 95 mL of Isovue-370. TECHNIQUE:   Multislice helical CT was performed from the diaphragm to the symphysis   pubis during uneventful rapid bolus intravenous contrast administration. Oral contrast was also administered .        Contiguous 5 mm axial images were reconstructed and lung and soft tissue windows were generated. Coronal   and sagittal reformations were generated. CT dose reduction was achieved   through use of a standardized protocol tailored for this examination and   automatic exposure control for dose modulation. FINDINGS:      LOWER CHEST: The visualized portions of the lung bases are clear. ABDOMEN:  Liver: The liver is normal in size and contour with no focal abnormality. Gallbladder and bile ducts: There is no calcified gallstone or biliary   dilatation. Spleen: No abnormality. Pancreas: No abnormality. Adrenal glands: No abnormality. Kidneys: No abnormality. PELVIS:  Reproductive organs: The uterus is normal and contains an intrauterine   device. There are small ovarian cysts. Bladder: No abnormality. BOWEL AND MESENTERY: The small bowel is normal.  There is no mesenteric mass   or adenopathy. The appendix is normal.           PERITONEUM: There is no ascites or free intraperitoneal air. RETROPERITONEUM: The aorta  tapers without aneurysm. There is no   retroperitoneal adenopathy or mass. There is no pelvic mass or adenopathy. BONES AND SOFT TISSUES: The bones and soft tissues of the abdominal wall are   within normal limits. IMPRESSION: No acute abdominal or pelvic abnormality. Normal appendix. Intrauterine device. Signing/Reading Doctor: Momo Zhu ([id number])    Approved: Momo Zhu ([id number])  Feb 21 2016  7:57PM                                 MRI Results (recent):    Results from Hospital Encounter encounter on 10/26/17   MRI CERV SPINE W WO CONT   Narrative EXAM:  MRI CERV SPINE W WO CONT  Clinical history: Altered mental status  INDICATION:  Altered mental status evaluate for possible demyelinating process. .  Transient alteration of awareness    COMPARISON: Correlation with brain MRI performed 9/29/2017    TECHNIQUE: MR imaging of the cervical spine was performed using the following  sequences: sagittal T1, T2, STIR;  axial T2, T1 prior to and following contrast  administration. CONTRAST:  15 mL of ProHance. FINDINGS:    There is no evidence of cord signal abnormality. There is no abnormal  postcontrast intramedullary enhancement. There is no evidence of cervical cord  demyelinating disease. There is no Chiari or syrinx. Cervical vasculature is  grossly unremarkable. Vertebral body heights are maintained. There is mild  congenital narrowing of the cervical canal.    The craniocervical junction is intact. The course, caliber, and signal  intensity of the spinal cord are normal. There is no pathologic intrathecal  enhancement. The paraspinal soft tissues are within normal limits. C2-C3:  No herniation or stenosis. C3-C4:  No herniation or stenosis. Facet arthropathy on the left. C4-C5:  Mild facet arthropathy bilaterally. Disc desiccation. Mild canal  stenosis. Foramina are patent    C5-C6:  Disc bulge/osteophyte with facet arthropathy greater on the left. Mild  canal and moderate to severe left foraminal stenosis. C6-C7:  Disc bulge/osteophyte. Mild canal stenosis. Mild right foraminal  stenosis. C7-T1:  No herniation or stenosis. Impression IMPRESSION:    There is no evidence of cervical cord demyelinating disease. Mild multilevel disc and facet degenerative change. Mild canal and moderate to severe left foraminal stenosis at C5-6. Mild canal stenoses at C4-5 and C6-7.            _____________________________________________________________________    Review of Systems: 10 point ROS was performed. Pertinent positives listed in HPI.    Denies:  balance difficulties, angina, palpitations, paresthesias, weakness, vision loss, slurred speech, aphasia, confusion, fever, chills, falls, headache, diplopia, back pain, neck pain, prior episodes of vertigo, hallucinations, new medications or dosage changes. _____________________________________________________________________  Impression  44 y.o. female with onset of potential seizures at least 3mo ago. Exam findings of potential dysmetria L FNF and slight difficulty following all directions. Imaging reviewed: MRI brain 9/17 showing periventricular signal abnormalities advanced for pt age but no acute findings. Labs : are stable. Pt has reported family history of seizures with her maternal grandmother and biological father. Pt reports being unaware of what is occurring when she has these moments of staring off into the distance for a minute up to 5-6 mintues. No EEG's yet have shown evidence of ictal waves. Refer to plan below. Assessment:  1. Seizure vs. PNES  2.   Migraine HA  3. PTSD  4. Depression  5. Anxiety    Plan  · Medications:  Continue Depakote ER QHS and Topamax  · Testing:  Continue prolonged EEG until Dr. Davis requests to discontinue  · Recommendations:  Seizure and fall precautions    ·   Continue great care by collaborating care team and nursing staff. ·  Testing discussed with patient --- any questions answered. My collaborating care team physician may have further recommendations.     On DVT Prophylaxis yes no   Continue lovenox while inpatient x      Care Plan discussed with:  Patient x   Family    RN x   Care Manager    Consultant/Specialist:  x   Patient will be discussed with Dr. Davis  ______________________________________________________________  Hospital Problems  Date Reviewed: 10/29/2017          Codes Class Noted POA    Seizure disorder ([facility] ICD-10-CM: G40.909  ICD-9-CM: 345.90  11/30/2017 Unknown        Seizure ([facility] ICD-10-CM: R56.9  ICD-9-CM: 780.39  11/30/2017 Unknown        Migraine headache ICD-10-CM: U69.264  ICD-9-CM: 346.90  11/30/2017 Unknown        Anxiety ICD-10-CM: F41.9  ICD-9-CM: 300.00  Unknown Yes              *Thank you for allowing [facility], to participate in the care of your patient. ---Radha Feliz, LOGANP  ================================================    ADDENDUM--> Collaborating Care Team Physician:  Chart reviewed and patient seen behind NP [facility]. Agree with formatting of of history etc... Says stereotypical episodes of headache-last recall, then told she has a blank stare and unable to respond to witnesses, although NP [facility] says her boyfriend does get some responses? No TC activities, eyes open and can lose bladder continence, after 5+ minutes comes to and recalls being fatigued but no pain etc... Told me she can not recall having an episode without an audience. Prior tests as noted. Including routine and 24 hour EEG's and MRI and has Psych dx of depression and anxiety. Lives with parents and unemployed. Proceed with testing as she manages continued unabated spells despite AED's.  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 nilsa

## 2017-11-30 NOTE — ED NOTES
TRANSFER - OUT REPORT:    Verbal report given to TIA Weber NRP with [facility](name) on [facility]  being transferred to [address] [place] [address](unit) for routine progression of care       Report consisted of patients Situation, Background, Assessment and   Recommendations(SBAR). Information from the following report(s) SBAR, MAR, vitals, telemetry, all test results was reviewed with TIA Weber NRP with [facility]. Lines:   Peripheral IV 11/30/17 Right Antecubital (Active)   Site Assessment Clean, dry, & intact 11/30/2017 10:27 AM   Phlebitis Assessment 0 11/30/2017 10:27 AM   Infiltration Assessment 0 11/30/2017 10:27 AM   Dressing Status Clean, dry, & intact 11/30/2017 10:27 AM   Dressing Type Transparent 11/30/2017 10:27 AM   Hub Color/Line Status Pink 11/30/2017 10:27 AM        Opportunity for questions and clarification was provided. Patient transported with: Cardiac, BP, RR, Spo2 monitoring. Reassessment at time of transfer is unchanged pt is stable for transport.

## 2017-11-30 NOTE — ED TRIAGE NOTES
Pt was wheeled to the treatment area. Pt states \"I was in physical therapy in the next building and they said they thought I had a seizure because my eyes got really big. \" \"I was recently having more frequent seizures so my doctor added a new medicine that I started last night. \"

## 2017-11-30 NOTE — H&P
[address] [address]  [phone]    Admission History and Physical      NAME:  Margie Caldera   :   1980   MRN:  [id number]     PCP:  Catherine Capellan MD     Date/Time:  2017         Subjective:     CHIEF COMPLAINT: seizure, headache      HISTORY OF PRESENT ILLNESS:     Ms. Anne Khan is a 44 y.o.  female who is admitted with seizure. Ms. Anne Khan with PMH of focal seizure, migraine headache, anxiety, depression presented to ER after she had seizure. Pt was having physical therapy this morning when she lost consciousness. She stated that she was confused and had bladder incontinent. According to PT note, pt was very lethargic, confused and had difficulty focusing at about the end of session. Pt stated that, she has been having frequent seizures recently. Had new seizure medication (Divalpoex) added yesterday and took first dose last night. Pt is c/o throbbing headache, which is severe in intensity.      Past Medical History:   Diagnosis Date    Anxiety     Anxiety     Chest pain     Depression     Diabetes ([address]     Fatigue     Headache     Headache(784.0)     Ill-defined condition     \"cervicle stenosis\"    Memory loss     Neurological disorder     siezures    Neuropathy     PTSD (post-traumatic stress disorder)     Rash     Ringing in ears     Ringing in ears     Skipped beats     Snoring     Visual disturbance         Past Surgical History:   Procedure Laterality Date    HX GYN         Social History   Substance Use Topics    Smoking status: Current Every Day Smoker     Packs/day: 0.50    Smokeless tobacco: Never Used    Alcohol use Yes      Comment: weekends        Family History   Problem Relation Age of Onset    Headache Mother     Neuropathy Mother     Cancer Father     Seizures Other     Heart Disease Other     Stroke Other         Allergies   Allergen Reactions    Latex Rash    Shellfish Derived Anaphylaxis    Pcn [Penicillins] Unknown (comments)    Tree Nut Swelling        Prior to Admission medications    Medication Sig Start Date End Date Taking? Authorizing Provider   divalproex ER (DEPAKOTE ER) 250 mg ER tablet Take 2 tablets by mouth nightly. 11/29/17  Yes Swati Reese NP   diclofenac EC (VOLTAREN) 75 mg EC tablet Take 1 Tab by mouth two (2) times a day. 11/21/17  Yes Swati Reese NP   topiramate (TOPAMAX) 100 mg tablet Take 1 Tab by mouth two (2) times a day. 11/15/17  Yes Swati Reese NP   sertraline (ZOLOFT) 50 mg tablet 50 mg daily. 8/28/17  Yes Historical Provider   diazePAM (VALIUM) 5 mg tablet 5 mg three (3) times daily as needed. 9/6/17  Yes Historical Provider   triamcinolone acetonide (KENALOG) 0.5 % ointment  9/20/17  Yes Historical Provider   SUMAtriptan (IMITREX) 100 mg tablet 1 at HA onset and repeat in 2 hours if needed. Max 2 in 24 hours 11/3/17   Sam Astorag NP   SUMAtriptan (IMITREX) 100 mg tablet 1 at HA onset and repeat in 2 hours if needed.   Max 2 in 24 hours 10/16/17   Sam Astorga NP         Review of Systems:  (bold if positive, if negative)    Gen:  Eyes:  ENT:  CVS:  Pulm:  GI:    :    MS:  Skin:  Psych:  Endo:    Hem:  Renal:    Neuro:  seizure, headache          Objective:      VITALS:    Vital signs reviewed; most recent are:    Visit Vitals    /80    Pulse 66    Temp 97.5 °F (36.4 °C)    Resp 14    Ht 5' 2\" (1.575 m)    Wt 81.6 kg (180 lb)    SpO2 97%    BMI 32.92 kg/m2     SpO2 Readings from Last 6 Encounters:   11/30/17 97%   09/22/17 99%   03/03/17 100%   02/28/17 100%   12/14/16 98%   12/12/16 96%        No intake or output data in the 24 hours ending 11/30/17 1520         Exam:     Physical Exam:    Gen:  Well-developed, well-nourished, in no acute distress  HEENT:  Pink conjunctivae, PERRL, hearing intact to voice, moist mucous membranes  Neck:  Supple, without masses, thyroid non-tender  Resp:  No accessory muscle use, clear breath sounds without wheezes rales or rhonchi  Card:  No murmurs, normal S1, S2 without thrills, bruits or peripheral edema  Abd:  Soft, non-tender, non-distended, normoactive bowel sounds are present, no palpable organomegaly and no detectable hernias  Lymph:  No cervical or inguinal adenopathy  Musc:  No cyanosis or clubbing  Skin:  No rashes or ulcers, skin turgor is good  Neuro:  Cranial nerves are grossly intact, no focal motor weakness, follows commands appropriately  Psych:  Good insight, oriented to person, place and time, alert       Labs:    Recent Labs      11/30/17   1028   WBC  10.1   HGB  13.5   HCT  41.0   PLT  258     Recent Labs      11/30/17   1028   NA  139   K  3.8   CL  105   CO2  24   GLU  89   BUN  11   CREA  0.88   CA  9.1   ALB  3.4*   TBILI  0.2   SGOT  24   ALT  43     Lab Results   Component Value Date/Time    Glucose (POC) 91 12/12/2016 01:26 PM     No results for input(s): PH, PCO2, PO2, HCO3, FIO2 in the last 72 hours. No results for input(s): INR in the last 72 hours. No lab exists for component: INREXT    Telemetry reviewed:          Assessment/Plan:    1. Seizure disorder ([facility] (11/30/2017). Admit to telemetry. Recommended continuous video EEG monitoring. Continue her home medications and consult neurology. Recently started on Divalpoex and titrating the dose. Seizure precaution. 2.  Anxiety/ depression. Continue home zoloft. 3.  Migraine headache (11/30/2017). Continue Topamax, imitrex.           Previous medical records reviewed     Risk of deterioration: medium      Total time spent with patient: [phone] [facility] discussed with: Patient, Nursing Staff and >50% of time spent in counseling and coordination of care    Discussed:  Care Plan    Prophylaxis:  Lovenox    Probable Disposition:  Home w/Family           ___________________________________________________    Attending Physician: Roselia Alonso MD

## 2017-11-30 NOTE — TELEPHONE ENCOUNTER
Attempted to contact patient. Left message with patients mother Flanagan Specter for a return call.      Per NP:  Can stop until smptoms get better (Routing comment)

## 2017-11-30 NOTE — PROGRESS NOTES
PT DAILY TREATMENT NOTE 2-15    Patient Name: Xavier Quinn  Date:2017  : 1980  [x]  Patient  Verified  Payor: Chaz Challenger / [place] / Product Type: Managed Care Medicaid /    In time:8:30a  Out time: 9:40a  Total Treatment Time (min): 70  Visit #: 6    Treatment Area: Left arm pain [M79.602]    SUBJECTIVE  Pain Level (0-10 scale): 0  Any medication changes, allergies to medications, adverse drug reactions, diagnosis change, or new procedure performed?: [] No    [x] Yes (see summary sheet for update)  Subjective functional status/changes:   [] No changes reported  Patient reports she is doing much better and had her medications adjusted. Patient reports she feels better, but the numbness is still present.     OBJECTIVE    Modality rationale: decrease pain and increase tissue extensibility to improve the patients ability to lift, carry, reach and complete ADL's   Min Type Additional Details   15 [x] Estim: []Att   [x]Unatt        []TENS instruct                  []IFC  [x]Premod   []NMES                     []Other:  []w/US   []w/ice   [x]w/heat  Position:seated  Location:cspine    []  Traction: [] Cervical       []Lumbar                       [] Prone          []Supine                       []Intermittent   []Continuous Lbs:  [] before manual  [] after manual  []w/heat    []  Ultrasound: []Continuous   [] Pulsed at:                            []1MHz   []3MHz Location:  W/cm2:    []  Paraffin         Location:  []w/heat    []  Ice     []  Heat  []  Ice massage Position:  Location:    []  Laser  []  Other: Position:  Location:    []  Vasopneumatic Device Pressure:       [] lo [] med [] hi   Temperature:    [x] Skin assessment post-treatment:  [x]intact []redness- no adverse reaction    []redness  adverse reaction:     45 min Therapeutic Exercise:  [x] See flow sheet :   Rationale: increase ROM and increase strength to improve the patients ability to lift, carry, reach and complete ADL's    10 min Manual Therapy:  MFR UT, levator scap in sitting   Rationale: decrease pain, increase ROM, increase tissue extensibility and decrease trigger points  to improve the patients ability to lift, carry, reach and complete ADL's      With   [x] TE   [] TA   [] neuro   [] other: Patient Education: [x] Review HEP    [x] Progressed/Changed HEP based on:   [x] positioning   [x] body mechanics   [] transfers   [] heat/ice application    [] other:      Other Objective/Functional Measures: no pain with interventions  AROM Cervical: 55 bilaterally  AROM Shoulder flexion:150 deg      Pain Level (0-10 scale) post treatment: 0    ASSESSMENT/Changes in Function: Pt had possible seizure at end of therapy, pt very lethargic, confused and had difficulty focusing. Pt taken to ED due to symptoms. Called Dr. Lisa Diamond to explain patient situation after therapy today who requested to hold PT until her symptoms get better and the seizures are under better control. Patient will continue to benefit from skilled PT services to modify and progress therapeutic interventions, address functional mobility deficits, address ROM deficits, address strength deficits, analyze and address soft tissue restrictions, analyze and cue movement patterns, analyze and modify body mechanics/ergonomics and assess and modify postural abnormalities to attain remaining goals. []  See Plan of Care  []  See progress note/recertification  []  See Discharge Summary         Progress towards goals / Updated goals:   Patient demonstrates good tolerance for interventions with verbal cues required for postural awareness and mechanics. Patient making progress towards goals. Short Term Goals: To be accomplished in 4 weeks:  1) The patient will be independent with introductory HEP Met  2) The patient will improve L shoulder flexion AROM to 120 deg to improve ease with reaching tasks.  Met  3) The patient will improve L cervical rotation AROM to 45 deg to improve ease with driving Met  Long Term Goals: To be accomplished in 12 weeks:  1) The patient will be independent with finalized HEP  2) The patient will demonstrate pain free shoulder AROM WFL to improve ease with household chores  3) The patient will demonstrate pain free cervcial AROM WFL to improve ease with  duties  Frequency / Duration: Patient to be seen 2 times per week for 12 weeks. PLAN  [x]  Upgrade activities as tolerated     [x]  Continue plan of care  []  Update interventions per flow sheet       []  Discharge due to:_  [x]  Other:_  Will hold PT until symptoms get better and MD clears.      Maxine Lyon 11/30/2017  8:42 AM

## 2017-11-30 NOTE — ED PROVIDER NOTES
Patient is a 44 y.o. female presenting with seizures. Seizure    Associated symptoms include headaches. Pertinent negatives include no chest pain, no cough, no nausea, no vomiting and no diarrhea. She reports headaches. She reports no chest pain, no diarrhea, no vomiting, no cough. 45 yo WF presents with seizure. Pt was PT for neck pain when she had a seizure. Pt states she has up to 4 seizures per day and is followed by neurology. Pt reports her seizure is a \"staring spell. \" She feels tired and confused. Pt states she had a small amount of urinary incontinence. Denies tongue biting or injury. Pt is at her baseline at this time. C/o headache, typical for pt and occurring frequently. Denies vomiting or diarrhea, fever, chills, recent illness. Had new seizure medication (valproic acid) added yesterday and took first dose last night. No other complaints. Past Medical History:   Diagnosis Date    Anxiety     Anxiety     Chest pain     Depression     Diabetes (HCC)     Fatigue     Headache     Headache(784.0)     Memory loss     Neurological disorder     siezures    Neuropathy     PTSD (post-traumatic stress disorder)     Rash     Ringing in ears     Ringing in ears     Skipped beats     Snoring     Visual disturbance        Past Surgical History:   Procedure Laterality Date    HX GYN           Family History:   Problem Relation Age of Onset    Headache Mother     Neuropathy Mother     Cancer Father     Seizures Other     Heart Disease Other     Stroke Other        Social History     Social History    Marital status: SINGLE     Spouse name: N/A    Number of children: N/A    Years of education: N/A     Occupational History    Not on file.      Social History Main Topics    Smoking status: Current Every Day Smoker     Packs/day: 0.50    Smokeless tobacco: Never Used    Alcohol use Yes      Comment: weekends    Drug use: No    Sexual activity: Not on file     Other Topics Concern  Not on file     Social History Narrative         ALLERGIES: Latex; Shellfish derived; Pcn [penicillins]; and Tree nut    Review of Systems   Constitutional: Negative for chills and fever. Respiratory: Negative for cough and shortness of breath. Cardiovascular: Negative for chest pain. Gastrointestinal: Negative for abdominal pain, diarrhea, nausea and vomiting. Genitourinary: Negative for dysuria and hematuria. Musculoskeletal: Negative for back pain. Skin: Negative for rash. Neurological: Positive for headaches. Negative for weakness and numbness. All other systems reviewed and are negative.       Vitals:    11/30/17 1018 11/30/17 1025   BP: 118/65    Pulse: 68 70   Resp: 12 9   Temp: 97.6 °F (36.4 °C)    SpO2: 98% 99%   Weight: 81.6 kg (180 lb)    Height: 5' 2\" (1.575 m)             Physical Exam   Physical Examination: General appearance - alert, well appearing, and in no distress, oriented to person, place, and time and normal appearing weight  Eyes - pupils equal and reactive, extraocular eye movements intact  Neck - supple, no significant adenopathy  Chest - clear to auscultation, no wheezes, rales or rhonchi, symmetric air entry  Heart - normal rate, regular rhythm, normal S1, S2, no murmurs, rubs, clicks or gallops  Abdomen - soft, nontender, nondistended, no masses or organomegaly  Back exam - full range of motion, no tenderness, palpable spasm or pain on motion  Neurological - alert, oriented, normal speech, no focal findings or movement disorder noted  Musculoskeletal - no joint tenderness, deformity or swelling  Extremities - peripheral pulses normal, no pedal edema, no clubbing or cyanosis  Skin - normal coloration and turgor, no rashes, no suspicious skin lesions noted  MDM  Number of Diagnoses or Management Options  Nonintractable headache, unspecified chronicity pattern, unspecified headache type:   Seizure [facility]):      Amount and/or Complexity of Data Reviewed  Clinical lab tests: ordered and reviewed  Decide to obtain previous medical records or to obtain history from someone other than the patient: yes  Review and summarize past medical records: yes  Discuss the patient with other providers: yes (Neurology, hospitalist)    Patient Progress  Patient progress: improved    ED Course       Procedures   Pt had MRI brain done 9/2017.    12:15 PM  Discussed with Elizabeth Ramirez, neurology. He discussed with Dr. Emily Beebe and would like pt admitted for further evaluation of seizure. Updated pt and family on test results and plan for admission. 12:25 PM  Discussed with Dr. Adin Castro, hospitalist. Will admit.

## 2017-11-30 NOTE — CONSULTS
NAME:  Talat Alejandro   :  1980  MRN:  [id number]  Date:  2017   PCP:    Nely Hussein MD      Neurology:    · Consult received, chart reviewed. Patient to be seen this afternoon.     _______________  Sweetie Smart

## 2017-11-30 NOTE — ED NOTES
Purposeful rounding done. Pt resting in bed with eyes closed. Offered assist with any needs. Pt states \"pain level 5 and no needs at this time. \" Call bell in reach will continue to monitor.

## 2017-12-01 VITALS
WEIGHT: 179.45 LBS | HEART RATE: 66 BPM | HEIGHT: 62 IN | DIASTOLIC BLOOD PRESSURE: 69 MMHG | BODY MASS INDEX: 33.02 KG/M2 | OXYGEN SATURATION: 99 % | RESPIRATION RATE: 16 BRPM | SYSTOLIC BLOOD PRESSURE: 115 MMHG | TEMPERATURE: 98.4 F

## 2017-12-01 LAB
BACTERIA SPEC CULT: NORMAL
BACTERIA SPEC CULT: NORMAL
SERVICE CMNT-IMP: NORMAL

## 2017-12-01 PROCEDURE — 74011250636 HC RX REV CODE- 250/636: Performed by: INTERNAL MEDICINE

## 2017-12-01 PROCEDURE — 96372 THER/PROPH/DIAG INJ SC/IM: CPT

## 2017-12-01 PROCEDURE — 99218 HC RM OBSERVATION: CPT

## 2017-12-01 PROCEDURE — 74011250637 HC RX REV CODE- 250/637: Performed by: INTERNAL MEDICINE

## 2017-12-01 RX ADMIN — DICLOFENAC SODIUM 75 MG: 25 TABLET, DELAYED RELEASE ORAL at 09:40

## 2017-12-01 RX ADMIN — Medication 10 ML: at 14:00

## 2017-12-01 RX ADMIN — SUMATRIPTAN SUCCINATE 100 MG: 25 TABLET ORAL at 13:37

## 2017-12-01 RX ADMIN — DICLOFENAC SODIUM 75 MG: 25 TABLET, DELAYED RELEASE ORAL at 17:05

## 2017-12-01 RX ADMIN — Medication 10 ML: at 01:53

## 2017-12-01 RX ADMIN — ENOXAPARIN SODIUM 40 MG: 40 INJECTION SUBCUTANEOUS at 17:05

## 2017-12-01 RX ADMIN — DIVALPROEX SODIUM 250 MG: 250 TABLET, FILM COATED, EXTENDED RELEASE ORAL at 01:53

## 2017-12-01 RX ADMIN — TOPIRAMATE 100 MG: 100 TABLET, FILM COATED ORAL at 17:06

## 2017-12-01 RX ADMIN — SERTRALINE HYDROCHLORIDE 50 MG: 50 TABLET ORAL at 09:40

## 2017-12-01 RX ADMIN — TOPIRAMATE 100 MG: 100 TABLET, FILM COATED ORAL at 09:40

## 2017-12-01 NOTE — PROGRESS NOTES
[facility] Neurology  [facility] [address]  [phone]     Inpatient Neurology Progress  Janae Chandler, [facility]  Name: Jay Jaeger     : 1980   MRN: [id number]   Date: 2017   ______________________________________________________________________  * I have reviewed flowsheet data, labs and previous day's notes.     Subjective:   CC:  I slept ok   ·  I have a small HA in back of my head  · No noted seizure-like events per RN  ·  no overnight events  ·  continuous EEG remains  Objective:     Vital Signs:    Visit Vitals    /61 (BP 1 Location: Right arm, BP Patient Position: At rest)    Pulse 76    Temp 98.1 °F (36.7 °C)    Resp 14    Ht 5' 2\" (1.575 m)    Wt 81.4 kg (179 lb 7.3 oz)    SpO2 98%    BMI 32.82 kg/m2       O2 Device: Room air       Temp (24hrs), Av.9 °F (36.6 °C), Min:97.5 °F (36.4 °C), Max:98.4 °F (36.9 °C)       Intake/Output:     Intake/Output Summary (Last 24 hours) at 17 0750  Last data filed at 17 0145   Gross per 24 hour   Intake                0 ml   Output              600 ml   Net             -600 ml       Medications:  Current Facility-Administered Medications   Medication Dose Route Frequency    sodium chloride (NS) flush 5-10 mL  5-10 mL IntraVENous Q8H    sodium chloride (NS) flush 5-10 mL  5-10 mL IntraVENous PRN    enoxaparin (LOVENOX) injection 40 mg  40 mg SubCUTAneous Q24H    diazePAM (VALIUM) tablet 5 mg  5 mg Oral Q8H PRN    diclofenac EC (VOLTAREN) tablet 75 mg  75 mg Oral BID    sertraline (ZOLOFT) tablet 50 mg  50 mg Oral DAILY    SUMAtriptan (IMITREX) tablet 100 mg  100 mg Oral Q8H PRN    topiramate (TOPAMAX) tablet 100 mg  100 mg Oral BID    divalproex ER (DEPAKOTE ER) 24 hour tablet 250 mg  250 mg Oral QHS       Physical Exam:   General: Well developed WF in NAD  Lungs:    Clear to auscultation bilaterally       Cardiac: Regular rate and rhythm   Neck: Supple, no bruits     Extrem: no LE edema Skin:  Intact, dry    Neurological Exam: awake, oriented x 4, slow to follow commands at times and will intermittently open eyes longer during events she is slowly following commands and taking longer to answer questions but does answer question    · Eyes: tracking objects and making eye contact  · Speech:   Clear, no dysarthria   · Strength:  R: prox/dist  5.5,  L prox/dist  5-/5  · reflexes throughout  and toes downgoing    ____________________________________________________________________    Labs:   Recent Labs      11/30/17   1028   WBC  10.1   HGB  13.5   HCT  41.0   PLT  258     Recent Labs      11/30/17   1028   NA  139   K  3.8   CL  105   CO2  24   GLU  89   BUN  11   CREA  0.88   CA  9.1   ALB  3.4*   TBILI  0.2   SGOT  24   ALT  43     No results for input(s): PH, PCO2, PO2, HCO3, FIO2 in the last 72 hours. Imaging:  CT Results (recent):    Results from Hospital Encounter encounter on 02/21/16   CT ABD PELV W CONT   Narrative **Final Report**      ICD Codes / Adm. Diagnosis: 057336   / Abdominal Pain  appendicitis  Examination:  CT ABD PELVIS W CON  - [id number] - Feb 21 2016  7:46PM  Accession No:  [id number]  Reason:  r/o appy - labs patient first - neg hcg/Cr      REPORT:  EXAM: CT ABDOMEN PELVIS WITH CONTRAST    INDICATION:  Abdominal pain and appendicitis. COMPARISON: None. CONTRAST: 95 mL of Isovue-370. TECHNIQUE:   Multislice helical CT was performed from the diaphragm to the symphysis   pubis during uneventful rapid bolus intravenous contrast administration. Oral contrast was also administered . Contiguous 5 mm axial images   were reconstructed and lung and soft tissue windows were generated. Coronal   and sagittal reformations were generated. CT dose reduction was achieved   through use of a standardized protocol tailored for this examination and   automatic exposure control for dose modulation.         FINDINGS:      LOWER CHEST: The visualized portions of the lung bases are clear.         ABDOMEN:  Liver: The liver is normal in size and contour with no focal abnormality. Gallbladder and bile ducts: There is no calcified gallstone or biliary   dilatation. Spleen: No abnormality. Pancreas: No abnormality. Adrenal glands: No abnormality. Kidneys: No abnormality. PELVIS:  Reproductive organs: The uterus is normal and contains an intrauterine   device. There are small ovarian cysts. Bladder: No abnormality. BOWEL AND MESENTERY: The small bowel is normal.  There is no mesenteric mass   or adenopathy. The appendix is normal.           PERITONEUM: There is no ascites or free intraperitoneal air. RETROPERITONEUM: The aorta  tapers without aneurysm. There is no   retroperitoneal adenopathy or mass. There is no pelvic mass or adenopathy. BONES AND SOFT TISSUES: The bones and soft tissues of the abdominal wall are   within normal limits. IMPRESSION: No acute abdominal or pelvic abnormality. Normal appendix. Intrauterine device. Signing/Reading Doctor: Kip Grullon ([id number])    Approved: Kip Grullon ([id number])  Feb 21 2016  7:57PM                                 MRI Results (recent):    Results from Hospital Encounter encounter on 10/26/17   MRI CERV SPINE W WO CONT   Narrative EXAM:  MRI CERV SPINE W WO CONT  Clinical history: Altered mental status  INDICATION:  Altered mental status evaluate for possible demyelinating process. .  Transient alteration of awareness    COMPARISON: Correlation with brain MRI performed 9/29/2017    TECHNIQUE: MR imaging of the cervical spine was performed using the following  sequences: sagittal T1, T2, STIR;  axial T2, T1 prior to and following contrast  administration. CONTRAST:  15 mL of ProHance. FINDINGS:    There is no evidence of cord signal abnormality. There is no abnormal  postcontrast intramedullary enhancement. There is no evidence of cervical cord  demyelinating disease.  There is no Chiari or syrinx. Cervical vasculature is  grossly unremarkable. Vertebral body heights are maintained. There is mild  congenital narrowing of the cervical canal.    The craniocervical junction is intact. The course, caliber, and signal  intensity of the spinal cord are normal. There is no pathologic intrathecal  enhancement. The paraspinal soft tissues are within normal limits. C2-C3:  No herniation or stenosis. C3-C4:  No herniation or stenosis. Facet arthropathy on the left. C4-C5:  Mild facet arthropathy bilaterally. Disc desiccation. Mild canal  stenosis. Foramina are patent    C5-C6:  Disc bulge/osteophyte with facet arthropathy greater on the left. Mild  canal and moderate to severe left foraminal stenosis. C6-C7:  Disc bulge/osteophyte. Mild canal stenosis. Mild right foraminal  stenosis. C7-T1:  No herniation or stenosis. Impression IMPRESSION:    There is no evidence of cervical cord demyelinating disease. Mild multilevel disc and facet degenerative change. Mild canal and moderate to severe left foraminal stenosis at C5-6. Mild canal stenoses at C4-5 and C6-7.          ____________________________________________________________________    Review of Systems: Denies:  SOB, angina, fever, NV, vision changes or weakness    Impression:   Summary:  WF with hx of blank staring events lasting up to 6 minutes that are preceded by a headache and may be associated with stress or lights per pt report; over past 3mo. After EEG and ambulatory EEG no abnormal electrical firing indicating ictal waves since OP neurologic evaluations. Admitted post similar event 11/30 while at physical therapy. 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 PTSD  3. Migraine HA  4. cervical stenosis    Plan:      Pt reports several month hx of increased stress with family matters. She wouldn't go into more detail than this, other than difficulties with her daughters father.   Pt is not employed and lives with her mother and step-father who is disabled.  --->  Have not been able to witness event while she is here  · Medications: continue Topamax 100mg BID and Depakote 250mg ER QHS  · Labs/testing: cont prolonged EEG until stated otherwise per Dr. Shelby Alexis  · Recommendations:  Pt not allowed to drive N2[place] after last seizure like event/blacking out spell occurs until cleared by MD--informed of this. May recommend Psychiatry consult    · Continue great care by collaborating care team and nursing staff. · Testing discussed with patient -- any questions answered. On DVT Prophylaxis yes no   Continue lovenox while inpatient x      Care Plan discussed with:  Patient x   Family:    RN: x   Care Manager    Consultant/Specialist:       My collaborating care team physician may have further recommendations.   Patient will be discussed with Dr. Shelby Alexis  ______________________________________________________________  Hospital Problems  Date Reviewed: 12/1/2017          Codes Class Noted POA    Migraine headache ICD-10-CM: M46.412  ICD-9-CM: 346.90  11/30/2017 Unknown        * (Principal)Transient alteration of awareness ICD-10-CM: R40.4  ICD-9-CM: 780.02  9/22/2017 Yes        Anxiety ICD-10-CM: F41.9  ICD-9-CM: 300.00  Unknown Yes               ================================================  ---GLENDY Etienne  ______________________________________________________________    ADDENDUM--> Collaborating Care Team Physician:

## 2017-12-01 NOTE — PROGRESS NOTES
[facility] [place] [facility]  [address] [facility], [place], [address] [place] [address]  [phone]      Medical Progress Note      NAME: Christie Beaver   :  1980  MRM:  [id number]    Date/Time: 2017  7:14 AM       Assessment and Plan:   1. Seizure disorder ([address] (2017) vs PNEN. Continue continuous video EEG monitoring. Continue her home medications. Neurology evaluation appreciated. Recently started on Divalpoex and titrating the dose. Seizure precaution.       2. Anxiety/ depression. Continue home zoloft.      3. Migraine headache (2017). Continue Topamax, imitrex. Subjective:     Chief Complaint:  Follow up of pt who was admitted with seizure. ROS:  (bold if positive, if negative)      Tolerating PT  Tolerating Diet        Objective:     Last 24hrs VS reviewed since prior progress note.  Most recent are:    Visit Vitals    /61 (BP 1 Location: Right arm, BP Patient Position: At rest)    Pulse 76    Temp 98.1 °F (36.7 °C)    Resp 14    Ht 5' 2\" (1.575 m)    Wt 81.4 kg (179 lb 7.3 oz)    SpO2 98%    BMI 32.82 kg/m2     SpO2 Readings from Last 6 Encounters:   17 98%   17 99%   17 100%   17 100%   16 98%   16 96%          Intake/Output Summary (Last 24 hours) at 17 9063  Last data filed at 17 0145   Gross per 24 hour   Intake                0 ml   Output              600 ml   Net             -600 ml        Physical Exam:    Gen:  Well-developed, well-nourished, in no acute distress  HEENT:  Pink conjunctivae, PERRL, hearing intact to voice, moist mucous membranes  Neck:  Supple, without masses, thyroid non-tender  Resp:  No accessory muscle use, clear breath sounds without wheezes rales or rhonchi  Card:  No murmurs, normal S1, S2 without thrills, bruits or peripheral edema  Abd:  Soft, non-tender, non-distended, normoactive bowel sounds are present, no palpable organomegaly and no detectable hernias  Lymph:  No cervical or inguinal adenopathy  Musc:  No cyanosis or clubbing  Skin:  No rashes or ulcers, skin turgor is good  Neuro:  Cranial nerves are grossly intact, no focal motor weakness, follows commands appropriately  Psych:  Good insight, oriented to person, place and time, alert  __________________________________________________________________  Medications Reviewed: (see below)  Medications:     Current Facility-Administered Medications   Medication Dose Route Frequency    sodium chloride (NS) flush 5-10 mL  5-10 mL IntraVENous Q8H    sodium chloride (NS) flush 5-10 mL  5-10 mL IntraVENous PRN    enoxaparin (LOVENOX) injection 40 mg  40 mg SubCUTAneous Q24H    diazePAM (VALIUM) tablet 5 mg  5 mg Oral Q8H PRN    diclofenac EC (VOLTAREN) tablet 75 mg  75 mg Oral BID    sertraline (ZOLOFT) tablet 50 mg  50 mg Oral DAILY    SUMAtriptan (IMITREX) tablet 100 mg  100 mg Oral Q8H PRN    topiramate (TOPAMAX) tablet 100 mg  100 mg Oral BID    divalproex ER (DEPAKOTE ER) 24 hour tablet 250 mg  250 mg Oral QHS        Lab Data Reviewed: (see below)  Lab Review:     Recent Labs      11/30/17   1028   WBC  10.1   HGB  13.5   HCT  41.0   PLT  258     Recent Labs      11/30/17   1028   NA  139   K  3.8   CL  105   CO2  24   GLU  89   BUN  11   CREA  0.88   CA  9.1   ALB  3.4*   TBILI  0.2   SGOT  24   ALT  43     Lab Results   Component Value Date/Time    Glucose (POC) 91 12/12/2016 01:26 PM     No results for input(s): PH, PCO2, PO2, HCO3, FIO2 in the last 72 hours. No results for input(s): INR in the last 72 hours. No lab exists for component: INREXT  All Micro Results     Procedure Component Value Units Date/Time    CULTURE, MRSA [[id number]] Collected:  11/30/17 1540    Order Status:  Completed Specimen:  Nares Updated:  11/30/17 9932          I have reviewed notes of prior 24hr. Other pertinent lab:       Total time spent with patient: [address] discussed with: Patient, Nursing Staff and >50% of time spent in counseling and coordination of care    Discussed:  Care Plan    Prophylaxis:  Lovenox    Disposition:  Home w/Family           ___________________________________________________    Attending Physician: Shellie Hernandez MD

## 2017-12-01 NOTE — WOUND CARE
Wound care consult for staples in back of scalp, laceration sustained in fall at home after seizure at PT. Discussed with staff nurse, states patient has no wound care needs at this time, currently having EEG. Reconsult if needed.   Terry Valenzuelap

## 2017-12-01 NOTE — DISCHARGE SUMMARY
Hospitalist Discharge Summary     Patient ID:    Abel Alvarez  [id number]  21 y.o.  1980    Admit date: 11/30/2017    Discharge date and time: 12/1/2017    Admission Diagnoses: Seizure [facility])    Chronic Diagnoses:    Problem List as of 12/1/2017  Date Reviewed: 12/1/2017          Codes Class Noted - Resolved    Migraine headache ICD-10-CM: D39.063  ICD-9-CM: 346.90  11/30/2017 - Present        Unresponsive ICD-10-CM: R41.89  ICD-9-CM: 780.09  9/22/2017 - Present        Spell of altered consciousness ICD-10-CM: R40.4  ICD-9-CM: 780.09  9/22/2017 - Present        * (Principal)Transient alteration of awareness ICD-10-CM: R40.4  ICD-9-CM: 780.02  9/22/2017 - Present        Analgesic overuse headache ICD-10-CM: T39.91XA, G44.40  ICD-9-CM: 339.3, E935.9  9/22/2017 - Present        Anxiety ICD-10-CM: F41.9  ICD-9-CM: 300.00  Unknown - Present        RESOLVED: Headache ICD-10-CM: R51  ICD-9-CM: 784.0  11/30/2017 - 11/30/2017              Discharge Medications:   Current Discharge Medication List      CONTINUE these medications which have NOT CHANGED    Details   divalproex ER (DEPAKOTE ER) 250 mg ER tablet Take 2 tablets by mouth nightly. Qty: 60 Tab, Refills: 5      diclofenac EC (VOLTAREN) 75 mg EC tablet Take 1 Tab by mouth two (2) times a day. Qty: 60 Tab, Refills: 5      topiramate (TOPAMAX) 100 mg tablet Take 1 Tab by mouth two (2) times a day. Qty: 60 Tab, Refills: 5    Associated Diagnoses: Spell of altered consciousness; Visual disturbance; Abnormal MRI; Intractable migraine without status migrainosus, unspecified migraine type      sertraline (ZOLOFT) 50 mg tablet 50 mg daily. diazePAM (VALIUM) 5 mg tablet 5 mg three (3) times daily as needed. triamcinolone acetonide (KENALOG) 0.5 % ointment       !! SUMAtriptan (IMITREX) 100 mg tablet 1 at HA onset and repeat in 2 hours if needed. Max 2 in 24 hours  Qty: 9 Tab, Refills: 5      !!  SUMAtriptan (IMITREX) 100 mg tablet 1 at HA onset and repeat in 2 hours if needed. Max 2 in 24 hours  Qty: 12 Tab, Refills: 5    Associated Diagnoses: Spell of altered consciousness; Visual disturbance; Abnormal MRI; Intractable migraine without status migrainosus, unspecified migraine type       !! - Potential duplicate medications found. Please discuss with provider. Follow up Care:    1. Catherine Capellan MD in 1-2 weeks  2. Neurology    Diet:  Regular Diet    Disposition:  Home. Advanced Directive:    Discharge Exam:  See today's note. CONSULTATIONS: Neurology    Significant Diagnostic Studies:   Recent Labs      11/30/17   1028   WBC  10.1   HGB  13.5   HCT  41.0   PLT  258     Recent Labs      11/30/17   1028   NA  139   K  3.8   CL  105   CO2  24   BUN  11   CREA  0.88   GLU  89   CA  9.1     Recent Labs      11/30/17   1028   SGOT  24   ALT  43   AP  66   TBILI  0.2   TP  6.8   ALB  3.4*   GLOB  3.4     No results for input(s): INR, PTP, APTT in the last 72 hours. No lab exists for component: INREXT   No results for input(s): FE, TIBC, PSAT, FERR in the last 72 hours. No results for input(s): PH, PCO2, PO2 in the last 72 hours. No results for input(s): CPK, CKMB in the last 72 hours. No lab exists for component: TROPONINI  Lab Results   Component Value Date/Time    Glucose (POC) 91 12/12/2016 01:26 PM             HOSPITAL COURSE & TREATMENT RENDERED:   1. Seizure disorder ([address] (11/30/2017) vs [facility]. Had 24 hours continuous video EEG monitoring and per neurology, it is normal. Continue her home medications. Neurology evaluation appreciated. Recently started on Divalpoex and titrating the dose. FU with neurology.      2.  Anxiety/ depression. Continue home zoloft.       3.  Migraine headache (11/30/2017). Continue Topamax, imitrex. Discharged in improved condition.       Signed:  Ivette England MD  12/1/2017  6:50 PM  Hospitalist Discharge Summary     Patient ID:    Margie Caldera  [id number]  36 y.o.  1980    Admit date: 11/30/2017    Discharge date and time: 12/1/2017    Admission Diagnoses: Seizure [facility])    Chronic Diagnoses:    Problem List as of 12/1/2017  Date Reviewed: 12/1/2017          Codes Class Noted - Resolved    Migraine headache ICD-10-CM: W97.995  ICD-9-CM: 346.90  11/30/2017 - Present        Unresponsive ICD-10-CM: R41.89  ICD-9-CM: 780.09  9/22/2017 - Present        Spell of altered consciousness ICD-10-CM: R40.4  ICD-9-CM: 780.09  9/22/2017 - Present        * (Principal)Transient alteration of awareness ICD-10-CM: R40.4  ICD-9-CM: 780.02  9/22/2017 - Present        Analgesic overuse headache ICD-10-CM: T39.91XA, G44.40  ICD-9-CM: 339.3, E935.9  9/22/2017 - Present        Anxiety ICD-10-CM: F41.9  ICD-9-CM: 300.00  Unknown - Present        RESOLVED: Headache ICD-10-CM: R51  ICD-9-CM: 784.0  11/30/2017 - 11/30/2017              Discharge Medications:   Current Discharge Medication List      CONTINUE these medications which have NOT CHANGED    Details   divalproex ER (DEPAKOTE ER) 250 mg ER tablet Take 2 tablets by mouth nightly. Qty: 60 Tab, Refills: 5      diclofenac EC (VOLTAREN) 75 mg EC tablet Take 1 Tab by mouth two (2) times a day. Qty: 60 Tab, Refills: 5      topiramate (TOPAMAX) 100 mg tablet Take 1 Tab by mouth two (2) times a day. Qty: 60 Tab, Refills: 5    Associated Diagnoses: Spell of altered consciousness; Visual disturbance; Abnormal MRI; Intractable migraine without status migrainosus, unspecified migraine type      sertraline (ZOLOFT) 50 mg tablet 50 mg daily. diazePAM (VALIUM) 5 mg tablet 5 mg three (3) times daily as needed. triamcinolone acetonide (KENALOG) 0.5 % ointment       !! SUMAtriptan (IMITREX) 100 mg tablet 1 at HA onset and repeat in 2 hours if needed. Max 2 in 24 hours  Qty: 9 Tab, Refills: 5      !! SUMAtriptan (IMITREX) 100 mg tablet 1 at HA onset and repeat in 2 hours if needed.   Max 2 in 24 hours  Qty: 12 Tab, Refills: 5    Associated Diagnoses: Spell of altered consciousness; Visual disturbance; Abnormal MRI; Intractable migraine without status migrainosus, unspecified migraine type       !! - Potential duplicate medications found. Please discuss with provider. Follow up Care:    1. Keon Martinez MD in 1-2 weeks  2. neurolgy    Diet:  Regular Diet    Disposition:  Home. Advanced Directive:    Discharge Exam:  See today's note. CONSULTATIONS: Nephrology    Significant Diagnostic Studies:   Recent Labs      11/30/17   1028   WBC  10.1   HGB  13.5   HCT  41.0   PLT  258     Recent Labs      11/30/17   1028   NA  139   K  3.8   CL  105   CO2  24   BUN  11   CREA  0.88   GLU  89   CA  9.1     Recent Labs      11/30/17   1028   SGOT  24   ALT  43   AP  66   TBILI  0.2   TP  6.8   ALB  3.4*   GLOB  3.4     No results for input(s): INR, PTP, APTT in the last 72 hours. No lab exists for component: INREXT   No results for input(s): FE, TIBC, PSAT, FERR in the last 72 hours. No results for input(s): PH, PCO2, PO2 in the last 72 hours. No results for input(s): CPK, CKMB in the last 72 hours.     No lab exists for component: TROPONINI  Lab Results   Component Value Date/Time    Glucose (POC) 91 12/12/2016 01:26 PM             HOSPITAL COURSE & TREATMENT RENDERED:       Signed:  Eben Spain MD  12/1/2017  6:50 PM

## 2017-12-01 NOTE — PROGRESS NOTES
Pt has a history of seizure disorder,migraines and PTSD. When discharged,pt will follow-up with PCP (Dr Farnaz Sierra) and neurologist,Dr Ruelas and neurology NP Kaylynn Early. At this time,there are no identified home health or rehab needs.   Ashlee Richards  Team B  [phone]

## 2017-12-01 NOTE — PROGRESS NOTES
Neuro:  Patient's 24 hour prolonged EEG just finished and was normal. No capture of spells or electrographic seizure activity seen. Patient can go home as far as I am concerned, and meds as prior to admission can stay the same and follow up with Dr. Toni Blevins and HERBIE Reese in clinic. Thanks.  [email or web address]

## 2017-12-02 NOTE — DISCHARGE INSTRUCTIONS
ACUTE DIAGNOSES:  Seizure [facility])    CHRONIC MEDICAL DIAGNOSES:  Problem List as of 12/1/2017  Date Reviewed: 12/1/2017          Codes Class Noted - Resolved    Migraine headache ICD-10-CM: T58.769  ICD-9-CM: 346.90  11/30/2017 - Present        Unresponsive ICD-10-CM: R41.89  ICD-9-CM: 780.09  9/22/2017 - Present        Spell of altered consciousness ICD-10-CM: R40.4  ICD-9-CM: 780.09  9/22/2017 - Present        * (Principal)Transient alteration of awareness ICD-10-CM: R40.4  ICD-9-CM: 780.02  9/22/2017 - Present        Analgesic overuse headache ICD-10-CM: T39.91XA, G44.40  ICD-9-CM: 339.3, E935.9  9/22/2017 - Present        Anxiety ICD-10-CM: F41.9  ICD-9-CM: 300.00  Unknown - Present        RESOLVED: Headache ICD-10-CM: R51  ICD-9-CM: 784.0  11/30/2017 - 11/30/2017              DISCHARGE MEDICATIONS:         · It is important that you take the medication exactly as they are prescribed. · Keep your medication in the bottles provided by the pharmacist and keep a list of the medication names, dosages, and times to be taken in your wallet. · Do not take other medications without consulting your doctor. DIET:  Regular Diet    ACTIVITY: Activity as tolerated    ADDITIONAL INFORMATION: If you experience any of the following symptoms then please call your primary care physician or return to the emergency room if you cannot get hold of your doctor: Fever, chills, nausea, vomiting, diarrhea, change in mentation, falling, bleeding, shortness of breath. FOLLOW UP CARE:  Dr. Enrique Melendez MD  you are to call and set up an appointment to see them in 2 weeks. Follow-up with neurology as needed      Information obtained by :  I understand that if any problems occur once I am at home I am to contact my physician. I understand and acknowledge receipt of the instructions indicated above. Physician's or R.N.'s Signature                                                                  Date/Time                                                                                                                                              Patient or Representative Signature                                                          Date/Time

## 2017-12-02 NOTE — PROCEDURES
[facility] [place] [facility]   [facility], [address]   ROUTINE EEG REPORT       Name:  Jose Payton   MR#:  [id number]   :  1980   Account #:  [de-identified]    Date of Procedure:  2017   Date of Adm:  2017       PROCEDURE: Prolonged electroencephalogram monitoring inpatient,   [facility], that was begun on 2017 at 3:46 p.m.   with visual input during the recording process. Routine scalp leads were applied according to the [employer]   system. EEG monitor as well. This EEG was considered necessary in   the further evaluation and discrimination of the patient's seizure like   history that began about 3 months or so ago and continues despite   appropriate intervention with antiepileptic drugs. The recording starts off in the awake mode and it is 8+ Hz. Good   modulation with eye opening and eye closure. Photic stimulation   registers an appropriate photic drive at different harmonics. Thereafter,   she settles into some minor drowsiness episodes and around 4:45 she   is awake and has a phone call. Thereafter, it is an alternating scheme of   alertness and drowsiness. Soon thereafter, she is seen to take her   medicine and she is eating, appropriate artifact that is introduced into   these time frames. Around 6:20 p.m. she is examined by yours truly in   the course of a neurological exam. She takes another phone call. She   has further drowsiness, lights out around 8:50 p.m. Around 1:20 a.m. or   there abouts the patient has a visual monitor diverted, as there may   have been a bathroom break at the time (question baltazar). At that point,   the EKG monitor is lost to the body movement. Around 1:33 a.m. or   there abouts she demonstrates some self limited REM sleep. Around   2:40 a.m. she vacillates between light and very self limited moderate   sleep characteristics.  This continues until the monitor stops around 5:59 a.m. and   to be continued thereafter. The spike detector comes on at several   different points of interest. These points of interest were evaluated and   felt to be not representative of true spikes in the location that is   described. May have been transient sleep phenomena such as vertex transient  waves, but not spikes or sharps. This is an unrevealing initial segment on the prolonged EEG recording   of this patient started on 11/30/2017 at 3:46 p.m. and this particular   loop ends at 5:59 a.m. on 12/01/2017. She has alertness, she has   drowsiness, light sleep and very brief REM sleep. Nothing of any   particular interest is spotted so far, certainly not in the way of focal   slowing and/or spontaneous electrocerebral discharges and no spells captured. The recording process will   continue. This will be resumed with another dictation representing the   extended recording.         Casey Madsen MD      [facility] / [place]   D:  12/01/2017   06:41   T:  12/01/2017   22:01   Job #:  [id number]

## 2017-12-05 ENCOUNTER — APPOINTMENT (OUTPATIENT)
Dept: PHYSICAL THERAPY | Age: 37
End: 2017-12-05

## 2017-12-06 ENCOUNTER — TELEPHONE (OUTPATIENT)
Dept: NEUROLOGY | Age: 37
End: 2017-12-06

## 2017-12-06 NOTE — TELEPHONE ENCOUNTER
Attempted to contact patient. Left message on voicemail for return call . Per NP:  Keep the zoloft on. 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      Let me know if she needs a referral or has ever seen anyone thanks  (Routing comment)

## 2017-12-06 NOTE — TELEPHONE ENCOUNTER
----- Message from Kayla Caraballo sent at 12/5/2017  3:20 PM EST -----  Regarding: HERBIE Conklin/ Telephone/ Refill  The pt called because she has a few questions and concerns. She wants to know should she stop taking the mediation \"Zoloft\". Also, the pt had her staples removed yesterday, then had a seizure that she believes may be caused by her migraines. And she wants to know if DMV has been flagged, and told that she shouldn't be driving. Best contact number is [phone].

## 2017-12-06 NOTE — TELEPHONE ENCOUNTER
Pt returning call. Relayed note left in chart. PT believes zoloft may be causing symptoms, was confused about continiuing use Physical therapy has been stopped because of seizures and can she go back to that or does she need to see Dr. Enrrique Barboza. Also wants to know if she has been flagged for dmv because episodes occur during day. Mentioned that cervical stenosis may be causing seizures as well.  [phone]

## 2017-12-07 ENCOUNTER — APPOINTMENT (OUTPATIENT)
Dept: PHYSICAL THERAPY | Age: 37
End: 2017-12-07

## 2017-12-13 NOTE — TELEPHONE ENCOUNTER
Called and spoke to patient. Informed her of NP's response. She states that psychiatrist told her that sertraline(zoloft) can cause seizures and that is why he was weaning her down from 100mg to 50mg. She saw a police car and ambulance with the flashing lights, and she didn't realize it until later but she had urinated all over herself. She wanted to know if the zoloft could have made that happen has well. Per NP:  Cervical stenosis is not causing seizures. 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 That why our seizure medications do not work. Velia Mota to get in with psychiatry. Can get back to PT and see Dr. Patricia Rodriguez if needed. Not sure about DMV.  Not supposed ot drive for 6 months post LOC spell (Routing comment)

## 2017-12-14 NOTE — TELEPHONE ENCOUNTER
Attempted to contact patient. Not able to leave message. Mail box full    Per NP:  Not very concerning for this. Usually it is wellbutrin. They did not catch seizures on her brainwaves so we have no data supporting this.   (Routing comment)

## 2017-12-15 ENCOUNTER — OFFICE VISIT (OUTPATIENT)
Dept: NEUROLOGY | Age: 37
End: 2017-12-15

## 2017-12-15 VITALS
SYSTOLIC BLOOD PRESSURE: 116 MMHG | DIASTOLIC BLOOD PRESSURE: 62 MMHG | HEIGHT: 62 IN | BODY MASS INDEX: 33.49 KG/M2 | WEIGHT: 182 LBS

## 2017-12-15 DIAGNOSIS — R89.9 ABNORMAL LABORATORY TEST: ICD-10-CM

## 2017-12-15 DIAGNOSIS — G43.909 MIGRAINE WITHOUT STATUS MIGRAINOSUS, NOT INTRACTABLE, UNSPECIFIED MIGRAINE TYPE: Primary | ICD-10-CM

## 2017-12-15 NOTE — PROGRESS NOTES
Patient is here for follow up. She has not refilled valium because she has 11 pills left from the last month. Patient thinks Zoloft was causing uncontrollable urination. She read where that could be a side effect. Dr. Gala Rivera [facility] Numbness and tingling is the same. Patient is accompanied by her father.

## 2017-12-15 NOTE — PATIENT INSTRUCTIONS
[facility]   Statement to Patients  April 1, 2014      In an effort to ensure the large volume of patient prescription refills is processed in the most efficient and expeditious manner, we are asking our patients to assist us by calling your Pharmacy for all prescription refills, this will include also your  Mail Order Pharmacy. The pharmacy will contact our office electronically to continue the refill process. Please do not wait until the last minute to call your pharmacy. We need at least 48 hours (2days) to fill prescriptions. We also encourage you to call your pharmacy before going to  your prescription to make sure it is ready. With regard to controlled substance prescription refill requests (narcotic refills) that need to be picked up at our office, we ask your cooperation by providing us with at least 72 hours (3days) notice that you will need a refill. We will not refill narcotic prescription refill requests after 4:00pm on any weekday, Monday through Thursday, or after 2:00pm on Fridays, or on the weekends. We encourage everyone to explore another way of getting your prescription refill request processed using Copperfasten, our patient web portal through our electronic medical record system. Copperfasten is an efficient and effective way to communicate your medication request directly to the office and  downloadable as an noel on your smart phone . Copperfasten also features a review functionality that allows you to view your medication list as well as leave messages for your physician. Are you ready to get connected? If so please review the attatched instructions or speak to any of our staff to get you set up right away! Thank you so much for your cooperation. Should you have any questions please contact our Practice Administrator.     The Physicians and Staff,  [facility] [address] [address]  What is a living will?    A living will is a legal form you use to write down the kind of care you want at the end of your life. It is used by the health professionals who will treat you if you aren't able to decide for yourself. If you put your wishes in writing, your loved ones and others will know what kind of care you want. They won't need to guess. This can ease your mind and be helpful to others. A living will is not the same as an estate or property will. An estate will explains what you want to happen with your money and property after you die. Is a living will a legal document? A living will is a legal document. Each state has its own laws about living naqvi. If you move to another state, make sure that your living will is legal in the state where you now live. Or you might use a universal form that has been approved by many states. This kind of form can sometimes be completed and stored online. Your electronic copy will then be available wherever you have a connection to the Internet. In most cases, doctors will respect your wishes even if you have a form from a different state. · You don't need an  to complete a living will. But legal advice can be helpful if your state's laws are unclear, your health history is complicated, or your family can't agree on what should be in your living will. · You can change your living will at any time. Some people find that their wishes about end-of-life care change as their health changes. · In addition to making a living will, think about completing a medical power of  form. This form lets you name the person you want to make end-of-life treatment decisions for you (your \"health care agent\") if you're not able to. Many hospitals and nursing homes will give you the forms you need to complete a living will and a medical power of . · Your living will is used only if you can't make or communicate decisions for yourself anymore.  If you become able to make decisions again, you can accept or refuse any treatment, no matter what you wrote in your living will. · Your state may offer an online registry. This is a place where you can store your living will online so the doctors and nurses who need to treat you can find it right away. What should you think about when creating a living will? Talk about your end-of-life wishes with your family members and your doctor. Let them know what you want. That way the people making decisions for you won't be surprised by your choices. Think about these questions as you make your living will:  · Do you know enough about life support methods that might be used? If not, talk to your doctor so you know what might be done if you can't breathe on your own, your heart stops, or you're unable to swallow. · What things would you still want to be able to do after you receive life-support methods? Would you want to be able to walk? To speak? To eat on your own? To live without the help of machines? · If you have a choice, where do you want to be cared for? In your home? At a hospital or nursing home? · Do you want certain Protestant practices performed if you become very ill? · If you have a choice at the end of your life, where would you prefer to die? At home? In a hospital or nursing home? Somewhere else? · Would you prefer to be buried or cremated? · Do you want your organs to be donated after you die? What should you do with your living will? · Make sure that your family members and your health care agent have copies of your living will. · Give your doctor a copy of your living will to keep in your medical record. If you have more than one doctor, make sure that each one has a copy. · You may want to put a copy of your living will where it can be easily found. Where can you learn more? Go to http://irene-jing.info/. Enter V094 in the search box to learn more about \"Learning About Living Efrain. \"  Current as of: September 24, 2016  Content Version: 11.4  © 7330-4258 [employer]. Care instructions adapted under license by [employer] (which disclaims liability or warranty for this information). If you have questions about a medical condition or this instruction, always ask your healthcare professional. [address] any warranty or liability for your use of this information. Advance Directives: Care Instructions  Your Care Instructions  An advance directive is a legal way to state your wishes at the end of your life. It tells your family and your doctor what to do if you can no longer say what you want. There are two main types of advance directives. You can change them any time that your wishes change. · A living will tells your family and your doctor your wishes about life support and other treatment. · A durable power of  for health care lets you name a person to make treatment decisions for you when you can't speak for yourself. This person is called a health care agent. If you do not have an advance directive, decisions about your medical care may be made by a doctor or a  who doesn't know you. It may help to think of an advance directive as a gift to the people who care for you. If you have one, they won't have to make tough decisions by themselves. Follow-up care is a key part of your treatment and safety. Be sure to make and go to all appointments, and call your doctor if you are having problems. It's also a good idea to know your test results and keep a list of the medicines you take. How can you care for yourself at home? · Discuss your wishes with your loved ones and your doctor. This way, there are no surprises. · Many states have a unique form. Or you might use a universal form that has been approved by many states. This kind of form can sometimes be completed and stored online.  Your electronic copy will then be available wherever you have a connection to the Internet. In most cases, doctors will respect your wishes even if you have a form from a different state. · You don't need a  to do an advance directive. But you may want to get legal advice. · Think about these questions when you prepare an advance directive:  ¨ Who do you want to make decisions about your medical care if you are not able to? Many people choose a family member or close friend. ¨ Do you know enough about life support methods that might be used? If not, talk to your doctor so you understand. ¨ What are you most afraid of that might happen? You might be afraid of having pain, losing your independence, or being kept alive by machines. ¨ Where would you prefer to die? Choices include your home, a hospital, or a nursing home. ¨ Would you like to have information about hospice care to support you and your family? ¨ Do you want to donate organs when you die? ¨ Do you want certain Voodoo practices performed before you die? If so, put your wishes in the advance directive. · Read your advance directive every year, and make changes as needed. When should you call for help? Be sure to contact your doctor if you have any questions. Where can you learn more? Go to http://irene-jing.info/. Enter R264 in the search box to learn more about \"Advance Directives: Care Instructions. \"  Current as of: September 24, 2016  Content Version: 11.4  © 0310-2265 [employer]. Care instructions adapted under license by [employer] (which disclaims liability or warranty for this information). If you have questions about a medical condition or this instruction, always ask your healthcare professional. [address] any warranty or liability for your use of this information.

## 2017-12-15 NOTE — MR AVS SNAPSHOT
Visit Information Date & Time Provider Department Dept. Phone Encounter #  
 12/15/2017  2:00 PM Julia Almonte NP [facility] [phone] [id number] Follow-up Instructions Return in about 2 months (around 2/15/2018). Upcoming Health Maintenance Date Due Pneumococcal 19-64 Medium Risk (1 of 1 - PPSV23) 3/3/1998 PAP AKA CERVICAL CYTOLOGY 3/3/2000 Influenza Age 5 to Adult 8/1/2017 DTaP/Tdap/Td series (2 - Td) 11/26/2027 Allergies as of 12/15/2017  Review Complete On: 12/15/2017 By: Dipak Banda Severity Noted Reaction Type Reactions Latex  11/30/2017    Rash Penicillins High 11/26/2017   Systemic Anaphylaxis Shellfish Derived High 11/30/2017    Anaphylaxis Pcn [Penicillins]  03/28/2014    Unknown (comments) Tree Nut  11/30/2017    Swelling Current Immunizations  Reviewed on 12/1/2017 Name Date Tdap 11/26/2017 11:05 PM  
  
 Not reviewed this visit You Were Diagnosed With   
  
 Codes Comments Migraine without status migrainosus, not intractable, unspecified migraine type    -  Primary ICD-10-CM: H93.067 ICD-9-CM: 346.90 Abnormal laboratory test     ICD-10-CM: R89.9 ICD-9-CM: 796.4 Vitals BP Height(growth percentile) Weight(growth percentile) BMI OB Status Smoking Status 116/62 5' 2\" (1.575 m) 182 lb (82.6 kg) 33.29 kg/m2 IUD Current Every Day Smoker BMI and BSA Data Body Mass Index Body Surface Area  
 33.29 kg/m 2 1.9 m 2 Preferred Pharmacy Pharmacy Name Phone [facility] [address] [place] [address] [phone] Your Updated Medication List  
  
   
This list is accurate as of: 12/15/17  2:40 PM.  Always use your most recent med list.  
  
  
  
  
 diazePAM 5 mg tablet Commonly known as:  VALIUM  
5 mg three (3) times daily as needed. diclofenac EC 75 mg EC tablet Commonly known as:  VOLTAREN  
 Take 1 Tab by mouth two (2) times a day. divalproex  mg ER tablet Commonly known as:  DEPAKOTE ER Take 2 tablets by mouth nightly. sertraline 50 mg tablet Commonly known as:  ZOLOFT  
50 mg daily. * SUMAtriptan 100 mg tablet Commonly known as:  IMITREX  
1 at HA onset and repeat in 2 hours if needed. Max 2 in 24 hours * SUMAtriptan 100 mg tablet Commonly known as:  IMITREX  
1 at HA onset and repeat in 2 hours if needed. Max 2 in 24 hours * SUMAtriptan succinate 3 mg/0.5 mL Pnij Commonly known as:  Aditi Neas  
1 Syringe by SubCUTAneous route as needed. May repeat every hour X 4 doses. Max 4 doses in 24 hours. topiramate 100 mg tablet Commonly known as:  TOPAMAX Take 1 Tab by mouth two (2) times a day. triamcinolone acetonide 0.5 % ointment Commonly known as:  KENALOG * Notice: This list has 3 medication(s) that are the same as other medications prescribed for you. Read the directions carefully, and ask your doctor or other care provider to review them with you. Prescriptions Sent to Pharmacy Refills SUMAtriptan succinate (ZEMBRACE SYMTOUCH) 3 mg/0.5 mL pnij 5 Si Syringe by SubCUTAneous route as needed. May repeat every hour X 4 doses. Max 4 doses in 24 hours. Class: Normal  
 Pharmacy: [id number] [place] [address] Ph #: [phone] Route: SubCUTAneous We Performed the Following CBC WITH AUTOMATED DIFF [33434 CPT(R)] FERRITIN [78126 CPT(R)] IRON [id number] CPT(R)] Follow-up Instructions Return in about 2 months (around 2/15/2018). Patient Instructions PRESCRIPTION REFILL POLICY [facility] Statement to Patients 2014 In an effort to ensure the large volume of patient prescription refills is processed in the most efficient and expeditious manner, we are asking our patients to assist us by calling your Pharmacy for all prescription refills, this will include also your  Mail Order Pharmacy. The pharmacy will contact our office electronically to continue the refill process. Please do not wait until the last minute to call your pharmacy. We need at least 48 hours (2days) to fill prescriptions. We also encourage you to call your pharmacy before going to  your prescription to make sure it is ready. With regard to controlled substance prescription refill requests (narcotic refills) that need to be picked up at our office, we ask your cooperation by providing us with at least 72 hours (3days) notice that you will need a refill. We will not refill narcotic prescription refill requests after 4:00pm on any weekday, Monday through Thursday, or after 2:00pm on Fridays, or on the weekends. We encourage everyone to explore another way of getting your prescription refill request processed using transOMIC, our patient web portal through our electronic medical record system. transOMIC is an efficient and effective way to communicate your medication request directly to the office and  downloadable as an noel on your smart phone . transOMIC also features a review functionality that allows you to view your medication list as well as leave messages for your physician. Are you ready to get connected? If so please review the attatched instructions or speak to any of our staff to get you set up right away! Thank you so much for your cooperation. Should you have any questions please contact our Practice Administrator. The Physicians and Staff,  [facility] Eddie Watson [address] What is a living will? A living will is a legal form you use to write down the kind of care you want at the end of your life. It is used by the health professionals who will treat you if you aren't able to decide for yourself. If you put your wishes in writing, your loved ones and others will know what kind of care you want. They won't need to guess. This can ease your mind and be helpful to others. A living will is not the same as an estate or property will. An estate will explains what you want to happen with your money and property after you die. Is a living will a legal document? A living will is a legal document. Each state has its own laws about living naqvi. If you move to another state, make sure that your living will is legal in the state where you now live. Or you might use a universal form that has been approved by many states. This kind of form can sometimes be completed and stored online. Your electronic copy will then be available wherever you have a connection to the Internet. In most cases, doctors will respect your wishes even if you have a form from a different state. · You don't need an  to complete a living will. But legal advice can be helpful if your state's laws are unclear, your health history is complicated, or your family can't agree on what should be in your living will. · You can change your living will at any time. Some people find that their wishes about end-of-life care change as their health changes. · In addition to making a living will, think about completing a medical power of  form. This form lets you name the person you want to make end-of-life treatment decisions for you (your \"health care agent\") if you're not able to. Many hospitals and nursing homes will give you the forms you need to complete a living will and a medical power of . · Your living will is used only if you can't make or communicate decisions for yourself anymore. If you become able to make decisions again, you can accept or refuse any treatment, no matter what you wrote in your living will. · Your state may offer an online registry.  This is a place where you can store your living will online so the doctors and nurses who need to treat you can find it right away. What should you think about when creating a living will? Talk about your end-of-life wishes with your family members and your doctor. Let them know what you want. That way the people making decisions for you won't be surprised by your choices. Think about these questions as you make your living will: · Do you know enough about life support methods that might be used? If not, talk to your doctor so you know what might be done if you can't breathe on your own, your heart stops, or you're unable to swallow. · What things would you still want to be able to do after you receive life-support methods? Would you want to be able to walk? To speak? To eat on your own? To live without the help of machines? · If you have a choice, where do you want to be cared for? In your home? At a hospital or nursing home? · Do you want certain Scientologist practices performed if you become very ill? · If you have a choice at the end of your life, where would you prefer to die? At home? In a hospital or nursing home? Somewhere else? · Would you prefer to be buried or cremated? · Do you want your organs to be donated after you die? What should you do with your living will? · Make sure that your family members and your health care agent have copies of your living will. · Give your doctor a copy of your living will to keep in your medical record. If you have more than one doctor, make sure that each one has a copy. · You may want to put a copy of your living will where it can be easily found. Where can you learn more? Go to http://irene-jing.info/. Enter Z301 in the search box to learn more about \"Learning About Living Audra Both. \" Current as of: September 24, 2016 Content Version: 11.4 © 8127-8348 Healthwise, Incorporated.  Care instructions adapted under license by [address] (which disclaims liability or warranty for this information). If you have questions about a medical condition or this instruction, always ask your healthcare professional. [address] any warranty or liability for your use of this information. Advance Directives: Care Instructions Your Care Instructions An advance directive is a legal way to state your wishes at the end of your life. It tells your family and your doctor what to do if you can no longer say what you want. There are two main types of advance directives. You can change them any time that your wishes change. · A living will tells your family and your doctor your wishes about life support and other treatment. · A durable power of  for health care lets you name a person to make treatment decisions for you when you can't speak for yourself. This person is called a health care agent. If you do not have an advance directive, decisions about your medical care may be made by a doctor or a  who doesn't know you. It may help to think of an advance directive as a gift to the people who care for you. If you have one, they won't have to make tough decisions by themselves. Follow-up care is a key part of your treatment and safety. Be sure to make and go to all appointments, and call your doctor if you are having problems. It's also a good idea to know your test results and keep a list of the medicines you take. How can you care for yourself at home? · Discuss your wishes with your loved ones and your doctor. This way, there are no surprises. · Many states have a unique form. Or you might use a universal form that has been approved by many states. This kind of form can sometimes be completed and stored online. Your electronic copy will then be available wherever you have a connection to the Internet.  In most cases, doctors will respect your wishes even if you have a form from a different state. · You don't need a  to do an advance directive. But you may want to get legal advice. · Think about these questions when you prepare an advance directive: ¨ Who do you want to make decisions about your medical care if you are not able to? Many people choose a family member or close friend. ¨ Do you know enough about life support methods that might be used? If not, talk to your doctor so you understand. ¨ What are you most afraid of that might happen? You might be afraid of having pain, losing your independence, or being kept alive by machines. ¨ Where would you prefer to die? Choices include your home, a hospital, or a nursing home. ¨ Would you like to have information about hospice care to support you and your family? ¨ Do you want to donate organs when you die? ¨ Do you want certain Congregation practices performed before you die? If so, put your wishes in the advance directive. · Read your advance directive every year, and make changes as needed. When should you call for help? Be sure to contact your doctor if you have any questions. Where can you learn more? Go to http://irene-jing.info/. Enter R264 in the search box to learn more about \"Advance Directives: Care Instructions. \" Current as of: September 24, 2016 Content Version: 11.4 © 0234-1413 Healthwise, Incorporated. Care instructions adapted under license by [employer] (which disclaims liability or warranty for this information). If you have questions about a medical condition or this instruction, always ask your healthcare professional. [place] [address] any warranty or liability for your use of this information. Introducing \[facility] [place] [facility]\"" & HEALTH SERVICES! Dear Negra Tong: 
Thank you for requesting a [email or web address] account. Our records indicate that you already have an active [email or web address] account.   You can access your account anytime at [email or web address]. [email or web address]/[email or web address] Solutions Did you know that you can access your hospital and ER discharge instructions at any time in Petbrosia? You can also review all of your test results from your hospital stay or ER visit. Additional Information If you have questions, please visit the Frequently Asked Questions section of the Petbrosia website at [email or web address]. DeviceAuthority/'Rock[employer] Your Papert/. Remember, Petbrosia is NOT to be used for urgent needs. For medical emergencies, dial 911. Now available from your iPhone and Android! Please provide this summary of care documentation to your next provider. Your primary care clinician is listed as Leoncio Ross. If you have any questions after today's visit, please call [phone].

## 2017-12-18 NOTE — PROGRESS NOTES
David Marcial is a 44 y.o. female who presents with the following  Chief Complaint   Patient presents with   Daryle Colla comes in for a follow up from hospital stay with father. She is seeing psychiatry and weaning down off Zoloft and her symptoms actually have been getting better. She does want to get onto Lamictal per the psychiatrist. She has noticed since last visit she has wet herself multiple times over night. Is waking up having bite her tongue and side of her mouth. Has even woken up on the floor of the room a few times. She has woken up very stiff and sore out of bed also. Not as frequent though, actually a lot less frequent since coming down on Zoloft.   She is noticing that she is getting about 20 migrianes a month lasting all day everyday. Located in entire head. Has associated dizziness, visual disturbance where she will see flashing lights, nausea, light sound smell sensitive. Also has visual concerns. Back on Topamax now and titrating up to 100 mg but is on 75 right now. Has noticed not as painful or intense as they usually are but are still daily and lasting all day. She uses imitrex tablets for abortive therapy and these work well usually after 2 full tablets. She states her left arm weakness, numbness, and tingling is the same. HPI    Allergies   Allergen Reactions    Latex Rash    Penicillins Anaphylaxis    Shellfish Derived Anaphylaxis    Pcn [Penicillins] Unknown (comments)    Tree Nut Swelling       Current Outpatient Prescriptions   Medication Sig    SUMAtriptan succinate (ZEMBRACE SYMTOUCH) 3 mg/0.5 mL pnij 1 Syringe by SubCUTAneous route as needed. May repeat every hour X 4 doses. Max 4 doses in 24 hours.  divalproex ER (DEPAKOTE ER) 250 mg ER tablet Take 2 tablets by mouth nightly.  diclofenac EC (VOLTAREN) 75 mg EC tablet Take 1 Tab by mouth two (2) times a day.  topiramate (TOPAMAX) 100 mg tablet Take 1 Tab by mouth two (2) times a day.     SUMAtriptan (IMITREX) 100 mg tablet 1 at HA onset and repeat in 2 hours if needed. Max 2 in 24 hours    diazePAM (VALIUM) 5 mg tablet 5 mg three (3) times daily as needed.  triamcinolone acetonide (KENALOG) 0.5 % ointment     SUMAtriptan (IMITREX) 100 mg tablet 1 at HA onset and repeat in 2 hours if needed. Max 2 in 24 hours    sertraline (ZOLOFT) 50 mg tablet 50 mg daily. No current facility-administered medications for this visit. History   Smoking Status    Current Every Day Smoker    Packs/day: 0.50   Smokeless Tobacco    Never Used       Past Medical History:   Diagnosis Date    Anxiety     Anxiety     Chest pain     Depression     Diabetes ([address]     Fatigue     Headache     Headache(784.0)     Ill-defined condition     \"cervicle stenosis\"    Memory loss     Neurological disorder     siezures    Neuropathy     PTSD (post-traumatic stress disorder)     Rash     Ringing in ears     Ringing in ears     Skipped beats     Snoring     Visual disturbance        Past Surgical History:   Procedure Laterality Date    HX GYN         Family History   Problem Relation Age of Onset    Headache Mother     Neuropathy Mother     Cancer Father     Seizures Other     Heart Disease Other     Stroke Other     Seizures Maternal Grandmother        Social History     Social History    Marital status: SINGLE     Spouse name: N/A    Number of children: N/A    Years of education: N/A     Social History Main Topics    Smoking status: Current Every Day Smoker     Packs/day: 0.50    Smokeless tobacco: Never Used    Alcohol use Yes      Comment: weekends    Drug use: No    Sexual activity: Not Asked     Other Topics Concern    None     Social History Narrative       ROS    Remainder of comprehensive review is negative.      Physical Exam :    Visit Vitals    /62    Ht 5' 2\" (1.575 m)    Wt 82.6 kg (182 lb)    BMI 33.29 kg/m2       General: Well defined, nourished, and groomed individual in no acute distress.    Neck: Supple, nontender, no bruits, no pain with resistance to active range of motion.    Heart: Regular rate and rhythm, no murmurs, rub, or gallop. Normal S1S2. Lungs: Clear to auscultation bilaterally with equal chest expansion, no cough, no wheeze  Musculoskeletal: Extremities revealed no edema and had full range of motion of joints.    Psych: Good mood and bright affect    NEUROLOGICAL EXAMINATION:    Mental Status: Alert and oriented to person, place, and time    Cranial Nerves:    II, III, IV, VI: Visual acuity grossly intact. Visual fields are normal.    Pupils are equal, round, and reactive to light and accommodation.    Extra-ocular movements are full and fluid. Fundoscopic exam was benign, no ptosis or nystagmus.    V-XII: Hearing is grossly intact. Facial features are symmetric, with normal sensation and strength. The palate rises symmetrically and the tongue protrudes midline. Sternocleidomastoids 5/5. Motor Examination: Normal tone, bulk, and strength, 5/5 muscle strength throughout. Coordination: Finger to nose was normal. No resting or intention tremor    Gait and Station: Steady while walking. Normal arm swing. No pronator drift. No muscle wasting or fasiculations noted. Reflexes: DTRs 2+ throughout. Results for orders placed or performed during the hospital encounter of 11/30/17   CULTURE, MRSA   Result Value Ref Range    Special Requests: NO SPECIAL REQUESTS      Culture result: MRSA NOT PRESENT      Culture result:            Screening of patient nares for MRSA is for surveillance purposes and, if positive, to facilitate isolation considerations in high risk settings. It is not intended for automatic decolonization interventions per se as regimens are not sufficiently effective to warrant routine use.    CBC WITH AUTOMATED DIFF   Result Value Ref Range    WBC 10.1 3.6 - 11.0 K/uL    RBC 4.23 3.80 - 5.20 M/uL    HGB 13.5 11.5 - 16.0 g/dL    HCT 41.0 35.0 - 47.0 %    MCV 96.9 80.0 - 99.0 FL    MCH 31.9 26.0 - 34.0 PG    MCHC 32.9 30.0 - 36.5 g/dL    RDW 12.9 11.5 - 14.5 %    PLATELET 040 131 - 595 K/uL    NEUTROPHILS 59 32 - 75 %    LYMPHOCYTES 31 12 - 49 %    MONOCYTES 6 5 - 13 %    EOSINOPHILS 4 0 - 7 %    BASOPHILS 0 0 - 1 %    ABS. NEUTROPHILS 5.9 1.8 - 8.0 K/UL    ABS. LYMPHOCYTES 3.1 0.8 - 3.5 K/UL    ABS. MONOCYTES 0.7 0.0 - 1.0 K/UL    ABS. EOSINOPHILS 0.4 0.0 - 0.4 K/UL    ABS. BASOPHILS 0.0 0.0 - 0.1 K/UL    DF AUTOMATED     METABOLIC PANEL, COMPREHENSIVE   Result Value Ref Range    Sodium 139 136 - 145 mmol/L    Potassium 3.8 3.5 - 5.1 mmol/L    Chloride 105 97 - 108 mmol/L    CO2 24 21 - 32 mmol/L    Anion gap 10 5 - 15 mmol/L    Glucose 89 65 - 100 mg/dL    BUN 11 6 - 20 MG/DL    Creatinine 0.88 0.55 - 1.02 MG/DL    BUN/Creatinine ratio 13 12 - 20      GFR est AA >60 >60 ml/min/1.73m2    GFR est non-AA >60 >60 ml/min/1.73m2    Calcium 9.1 8.5 - 10.1 MG/DL    Bilirubin, total 0.2 0.2 - 1.0 MG/DL    ALT (SGPT) 43 12 - 78 U/L    AST (SGOT) 24 15 - 37 U/L    Alk. phosphatase 66 45 - 117 U/L    Protein, total 6.8 6.4 - 8.2 g/dL    Albumin 3.4 (L) 3.5 - 5.0 g/dL    Globulin 3.4 2.0 - 4.0 g/dL    A-G Ratio 1.0 (L) 1.1 - 2.2     HCG URINE, QL. - POC   Result Value Ref Range    Pregnancy test,urine (POC) NEGATIVE  NEG         Orders Placed This Encounter    CBC WITH AUTOMATED DIFF    IRON    FERRITIN    SUMAtriptan succinate (ZEMBRACE SYMTOUCH) 3 mg/0.5 mL pnij     Si Syringe by SubCUTAneous route as needed. May repeat every hour X 4 doses. Max 4 doses in 24 hours. Dispense:  1 Box     Refill:  5       1. Migraine without status migrainosus, not intractable, unspecified migraine type    2. Abnormal laboratory test        Follow-up Disposition:  Return in about 2 months (around 2/15/2018). Migraines the same.  She is having some abnormal blood levels with a high WBC and we will redraw this to evaluate and may send to hematology to evaluate further pending results. In regards to her pseudoseizures we did not catch anything in the admission on her EEG. Continue topamax and depakote but psychiatry may be adding Lamictal and this can help with headache and mood and this would in turn help her spells. They are decreasing and this is good. She is not driving. Can get back into PT for symptoms. Did not get to orthopedics.          This note will not be viewable in [facility]

## 2017-12-19 ENCOUNTER — TELEPHONE (OUTPATIENT)
Dept: NEUROLOGY | Age: 37
End: 2017-12-19

## 2017-12-19 LAB
BASOPHILS # BLD AUTO: 0 X10E3/UL (ref 0–0.2)
BASOPHILS NFR BLD AUTO: 0 %
EOSINOPHIL # BLD AUTO: 0.2 X10E3/UL (ref 0–0.4)
EOSINOPHIL NFR BLD AUTO: 3 %
ERYTHROCYTE [DISTWIDTH] IN BLOOD BY AUTOMATED COUNT: 12.7 % (ref 12.3–15.4)
FERRITIN SERPL-MCNC: 203 NG/ML (ref 15–150)
HCT VFR BLD AUTO: 39.2 % (ref 34–46.6)
HGB BLD-MCNC: 13 G/DL (ref 11.1–15.9)
IMM GRANULOCYTES # BLD: 0 X10E3/UL (ref 0–0.1)
IMM GRANULOCYTES NFR BLD: 0 %
IRON SERPL-MCNC: 151 UG/DL (ref 27–159)
LYMPHOCYTES # BLD AUTO: 3.8 X10E3/UL (ref 0.7–3.1)
LYMPHOCYTES NFR BLD AUTO: 40 %
MCH RBC QN AUTO: 31.6 PG (ref 26.6–33)
MCHC RBC AUTO-ENTMCNC: 33.2 G/DL (ref 31.5–35.7)
MCV RBC AUTO: 95 FL (ref 79–97)
MONOCYTES # BLD AUTO: 0.6 X10E3/UL (ref 0.1–0.9)
MONOCYTES NFR BLD AUTO: 6 %
NEUTROPHILS # BLD AUTO: 4.8 X10E3/UL (ref 1.4–7)
NEUTROPHILS NFR BLD AUTO: 51 %
PLATELET # BLD AUTO: 338 X10E3/UL (ref 150–379)
RBC # BLD AUTO: 4.12 X10E6/UL (ref 3.77–5.28)
WBC # BLD AUTO: 9.5 X10E3/UL (ref 3.4–10.8)

## 2017-12-19 NOTE — TELEPHONE ENCOUNTER
----- Message from Kayla Caraballo sent at 12/19/2017  3:22 PM EST -----  Regarding: HERBIE Conklin/ Telephone  The pt called to see if her lab work results have been received at the office. Best contact number is [phone].

## 2017-12-20 ENCOUNTER — TELEPHONE (OUTPATIENT)
Dept: NEUROLOGY | Age: 37
End: 2017-12-20

## 2017-12-20 NOTE — TELEPHONE ENCOUNTER
Called and spoke to patient. Informed her of NP's response. She states understanding and appreciation. Per NP:  Al of her labs are [facility] LONG TERM within normal limits.  She must have had a virus or infection of some sort as they are all normal now  (Routing comment)

## 2018-01-04 NOTE — ANCILLARY DISCHARGE INSTRUCTIONS
[address] [facility] [address]  Phone: [phone]  Fax: [phone]    Medicaid Discharge Summary  2-15    Patient name: Yanna Quispe  : 1980  Provider#: [id number]  Referral source: Dc Murray NP      Medical/Treatment Diagnosis: Left arm pain [M79.602]     Prior Hospitalization: see medical history     Comorbidities: See Plan of Care  Prior Level of Function:See Plan of Care  Medications: Verified on Patient Summary List    Start of Care: 11/10/17      Onset Date:3 months prior to start of care   Visits from Start of Care: 6    Missed Visits: 0  Reporting Period : 11/10/17 to 17      ASSESSMENT/SUMMARY OF CARE:  The patient has not been seen since 17 due to need for additional follow up on seizure disorder. RECOMMENDATIONS:  [x]Discontinue therapy: []Patient has reached or is progressing toward set goals      []Patient is non-compliant or has abdicated      []Due to lack of appreciable progress towards set goals      [x]Other: additional testing needed to address continued daily seizures    Charlyne Lesch, PT 2018 11:55 AM    ______________________________________________________________________    NOTE TO PHYSICIAN:  Please complete the following and fax to: [facility]: [phone]  Retain this original for your records. If you are unable to process this request in 24 hours, please contact our office.        [de-identified] Signature:____________________  Date:____________Time:_________

## 2018-01-05 ENCOUNTER — TELEPHONE (OUTPATIENT)
Dept: NEUROLOGY | Age: 38
End: 2018-01-05

## 2018-01-10 ENCOUNTER — TELEPHONE (OUTPATIENT)
Dept: NEUROLOGY | Age: 38
End: 2018-01-10

## 2018-02-07 ENCOUNTER — HOSPITAL ENCOUNTER (EMERGENCY)
Age: 38
Discharge: HOME | End: 2018-02-07
Attending: EMERGENCY MEDICINE
Payer: MEDICAID

## 2018-02-07 ENCOUNTER — APPOINTMENT (OUTPATIENT)
Dept: GENERAL RADIOLOGY | Age: 38
End: 2018-02-07
Attending: EMERGENCY MEDICINE
Payer: MEDICAID

## 2018-02-07 VITALS
BODY MASS INDEX: 32.39 KG/M2 | SYSTOLIC BLOOD PRESSURE: 137 MMHG | OXYGEN SATURATION: 100 % | TEMPERATURE: 98.7 F | HEART RATE: 78 BPM | WEIGHT: 176 LBS | HEIGHT: 62 IN | DIASTOLIC BLOOD PRESSURE: 81 MMHG | RESPIRATION RATE: 18 BRPM

## 2018-02-07 DIAGNOSIS — F17.200 SMOKING: ICD-10-CM

## 2018-02-07 DIAGNOSIS — B34.9 VIRAL SYNDROME: Primary | ICD-10-CM

## 2018-02-07 PROCEDURE — 99283 EMERGENCY DEPT VISIT LOW MDM: CPT

## 2018-02-07 PROCEDURE — 71046 X-RAY EXAM CHEST 2 VIEWS: CPT

## 2018-02-07 PROCEDURE — 74011250637 HC RX REV CODE- 250/637: Performed by: EMERGENCY MEDICINE

## 2018-02-07 RX ORDER — ONDANSETRON 4 MG/1
4 TABLET, ORALLY DISINTEGRATING ORAL
Qty: 10 TAB | Refills: 0 | Status: SHIPPED | OUTPATIENT
Start: 2018-02-07

## 2018-02-07 RX ORDER — ONDANSETRON 4 MG/1
4 TABLET, ORALLY DISINTEGRATING ORAL
Status: COMPLETED | OUTPATIENT
Start: 2018-02-07 | End: 2018-02-07

## 2018-02-07 RX ADMIN — ONDANSETRON 4 MG: 4 TABLET, ORALLY DISINTEGRATING ORAL at 13:57

## 2018-02-07 NOTE — ED PROVIDER NOTES
HPI Comments: 44 y.o. female with past medical history significant for diabetes, anxiety, ringing in ears, fatigue, snoring, PTSD, chest pain, memory loss, neuropathy, seizures, and cervical stenosis who presents from home with chief complaint of fatigue. Patient admits she has been feeling unwell since 01/29/18 with fatigue, fevers up to 100, and chills. Patient also complains of nausea and vomiting over the last 3 days with around 1-2 episodes daily. Patient also mentions 3-4 episodes of diarrhea over the last week. Patient states she had a flu shot this year, and she denies any chance of pregnancy since she is on Mirena. There are no other acute medical concerns at this time. Old Chart Review: Per note patient was adsmitted here on 11/30/17 for seizure and nonintractable headache. She was also evaluated here on 11/26/17 for seizure and laceration of the scalp. Social hx: Tobacco Use: Yes (1/2 pack per day), Alcohol Use: Yes (weekends), Drug Use: No    PCP: Marcia Leal MD    Note written by Bulmaro Sandoval, as dictated by Dejan Kelley MD 1:53 PM      The history is provided by the patient and medical records.         Past Medical History:   Diagnosis Date    Anxiety     Anxiety     Chest pain     Depression     Diabetes ([address]     Fatigue     Headache     Headache(784.0)     Ill-defined condition     \"cervicle stenosis\"    Memory loss     Neurological disorder     siezures    Neuropathy     PTSD (post-traumatic stress disorder)     Rash     Ringing in ears     Ringing in ears     Skipped beats     Snoring     Visual disturbance        Past Surgical History:   Procedure Laterality Date    HX GYN           Family History:   Problem Relation Age of Onset    Headache Mother     Neuropathy Mother     Cancer Father     Seizures Other     Heart Disease Other     Stroke Other     Seizures Maternal Grandmother        Social History     Social History    Marital status: SINGLE     Spouse name: N/A    Number of children: N/A    Years of education: N/A     Occupational History    Not on file. Social History Main Topics    Smoking status: Current Every Day Smoker     Packs/day: 0.50    Smokeless tobacco: Never Used    Alcohol use Yes      Comment: weekends    Drug use: No    Sexual activity: Not on file     Other Topics Concern    Not on file     Social History Narrative         ALLERGIES: Latex; Penicillins; Shellfish derived; Pcn [penicillins]; and Tree nut    Review of Systems   Constitutional: Positive for chills and fever. Respiratory: Positive for cough. Gastrointestinal: Positive for diarrhea, nausea and vomiting. Musculoskeletal: Positive for myalgias (generalized body aches). All other systems reviewed and are negative. Vitals:    02/07/18 1339   BP: 137/81   Pulse: 78   Resp: 18   Temp: 98.7 °F (37.1 °C)   SpO2: 100%   Weight: 79.8 kg (176 lb)   Height: 5' 2\" (1.575 m)            Physical Exam   Constitutional: She is oriented to person, place, and time. She appears well-developed and well-nourished. No distress. NAD, AxOx4, speaking in complete sentences     HENT:   Head: Normocephalic and atraumatic. Nose: Nose normal.   Mouth/Throat: Oropharynx is clear and moist. No oropharyngeal exudate. Eyes: Conjunctivae are normal. Pupils are equal, round, and reactive to light. Right eye exhibits no discharge. No scleral icterus. Neck: Normal range of motion. Neck supple. No JVD present. No tracheal deviation present. Cardiovascular: Normal rate, regular rhythm and normal heart sounds. Exam reveals no gallop and no friction rub. No murmur heard. Pulmonary/Chest: Effort normal and breath sounds normal. No respiratory distress. She has no wheezes. She has no rales. She exhibits no tenderness. Abdominal: Soft. Bowel sounds are normal. There is no tenderness. There is no rebound and no guarding.    nttp   Genitourinary: No vaginal discharge found.   Genitourinary Comments: Pt denies urinary/ vaginal complaints   Musculoskeletal: Normal range of motion. She exhibits no edema, tenderness or deformity. Neurological: She is alert and oriented to person, place, and time. She has normal reflexes. No cranial nerve deficit. She exhibits normal muscle tone. Coordination normal.   Skin: Skin is warm and dry. No rash noted. No erythema. No pallor. Psychiatric: She has a normal mood and affect. Her behavior is normal. Thought content normal.   Nursing note and vitals reviewed. [facility]      ED Course       Procedures    'feels better'/ agrees w/ plans;   Theodore Nayak's  results have been reviewed with her. She has been counseled regarding her diagnosis. She verbally conveys understanding and agreement of the signs, symptoms, diagnosis, treatment and prognosis and additionally agrees to Call/ Arrange follow up as recommended with Dr. Zohra Garcia MD in 24 - 48 hours. She also agrees with the care-plan and conveys that all of her questions have been answered. I have also put together some discharge instructions for her that include: 1) educational information regarding their diagnosis, 2) how to care for their diagnosis at home, as well a 3) list of reasons why they would want to return to the ED prior to their follow-up appointment, should their condition change or for concerns.

## 2018-02-07 NOTE — Clinical Note
Thank you for allowing us to provide you with medical care today. We realize that you have many choices for your emergency care needs. We thank you for choosing [facility]. Please choose us in the future for any continued health care needs. We hope we addressed all of your medical concerns. We strive to provide excellent quality care in the Emergency Department. Anything less than excellent does not meet our expectations. The exam and treatment you received in the Emergency Department  were for an emergent problem and are not intended as complete care. It is important that you follow up with a doctor, nurse practitioner, or [address] [place] [address] assistant for ongoing care. If your symptoms worsen or you do not improve as expected and you are un able to reach your usual health care provider, you should return to the Emergency Department. We are available 24 hours a day. Take this sheet with you when you go to your follow-up visit. If you have any problem arranging the follow-up visit, co ntact the Emergency Department immediately. Make an appointment your family doctor for follow up of this visit. Return to the ER if you are unable to be seen in a timely manner.

## 2018-02-07 NOTE — ED NOTES
The patient was given discharge instructions by MD and questions were answered. Patient is in no apparent distress at time of discharge. Ambulatory with steady gait.

## 2018-02-07 NOTE — ED TRIAGE NOTES
\"I think I have the flu, I have body aches and I'm throwing up and my nose is running, and I have fever and a cough. \" Patient does have a PCP. She was seen there on the 29th and was diagnosed with the flu, although they did not test her for influenza. She feels like she's gotten worse since then. She was prescribed Azythromycin at the PCP.

## 2018-02-07 NOTE — DISCHARGE INSTRUCTIONS
Viral Infections: Care Instructions  Your Care Instructions    You don't feel well, but it's not clear what's causing it. You may have a viral infection. Viruses cause many illnesses, such as the common cold, influenza, fever, rashes, and the diarrhea, nausea, and vomiting that are often called \"stomach flu. \" You may wonder if antibiotic medicines could make you feel better. But antibiotics only treat infections caused by bacteria. They don't work on viruses. The good news is that viral infections usually aren't serious. Most will go away in a few days without medical treatment. In the meantime, there are a few things you can do to make yourself more comfortable. Follow-up care is a key part of your treatment and safety. Be sure to make and go to all appointments, and call your doctor if you are having problems. It's also a good idea to know your test results and keep a list of the medicines you take. How can you care for yourself at home? · Get plenty of rest if you feel tired. · Take an over-the-counter pain medicine if needed, such as acetaminophen (Tylenol), ibuprofen (Advil, Motrin), or naproxen (Aleve). Read and follow all instructions on the label. · Be careful when taking over-the-counter cold or flu medicines and Tylenol at the same time. Many of these medicines have acetaminophen, which is Tylenol. Read the labels to make sure that you are not taking more than the recommended dose. Too much acetaminophen (Tylenol) can be harmful. · Drink plenty of fluids, enough so that your urine is light yellow or clear like water. If you have kidney, heart, or liver disease and have to limit fluids, talk with your doctor before you increase the amount of fluids you drink. · Stay home from work, school, and other public places while you have a fever. When should you call for help? Call 911 anytime you think you may need emergency care. For example, call if:  ? · You have severe trouble breathing.    ? · You passed out (lost consciousness). ?Call your doctor now or seek immediate medical care if:  ? · You seem to be getting much sicker. ? · You have a new or higher fever. ? · You have blood in your stools. ? · You have new belly pain, or your pain gets worse. ? · You have a new rash. ? Watch closely for changes in your health, and be sure to contact your doctor if:  ? · You start to get better and then get worse. ? · You do not get better as expected. Where can you learn more? Go to http://irene-jing.info/. Enter L170 in the search box to learn more about \"Viral Infections: Care Instructions. \"  Current as of: March 3, 2017  Content Version: 11.4  © 4927-8629 [employer]. Care instructions adapted under license by [employer] (which disclaims liability or warranty for this information). If you have questions about a medical condition or this instruction, always ask your healthcare professional. [place] [address] any warranty or liability for your use of this information. Stopping Smoking: Care Instructions  Your Care Instructions    Cigarette smokers crave the nicotine in cigarettes. Giving it up is much harder than simply changing a habit. Your body has to stop craving the nicotine. It is hard to quit, but you can do it. There are many tools that people use to quit smoking. You may find that combining tools works best for you. There are several steps to quitting. First you get ready to quit. Then you get support to help you. After that, you learn new skills and behaviors to become a nonsmoker. For many people, a necessary step is getting and using medicine. Your doctor will help you set up the plan that best meets your needs. You may want to attend a smoking cessation program to help you quit smoking. When you choose a program, look for one that has proven success. Ask your doctor for ideas.  You will greatly increase your chances of success if you take medicine as well as get counseling or join a cessation program.  Some of the changes you feel when you first quit tobacco are uncomfortable. Your body will miss the nicotine at first, and you may feel short-tempered and grumpy. You may have trouble sleeping or concentrating. Medicine can help you deal with these symptoms. You may struggle with changing your smoking habits and rituals. The last step is the tricky one: Be prepared for the smoking urge to continue for a time. This is a lot to deal with, but keep at it. You will feel better. Follow-up care is a key part of your treatment and safety. Be sure to make and go to all appointments, and call your doctor if you are having problems. It's also a good idea to know your test results and keep a list of the medicines you take. How can you care for yourself at home? · Ask your family, friends, and coworkers for support. You have a better chance of quitting if you have help and support. · Join a support group, such as Nicotine Anonymous, for people who are trying to quit smoking. · Consider signing up for a smoking cessation program, such as the American Lung Association's Freedom from Smoking program.  · Set a quit date. Pick your date carefully so that it is not right in the middle of a big deadline or stressful time. Once you quit, do not even take a puff. Get rid of all ashtrays and lighters after your last cigarette. Clean your house and your clothes so that they do not smell of smoke. · Learn how to be a nonsmoker. Think about ways you can avoid those things that make you reach for a cigarette. ¨ Avoid situations that put you at greatest risk for smoking. For some people, it is hard to have a drink with friends without smoking. For others, they might skip a coffee break with coworkers who smoke. ¨ Change your daily routine. Take a different route to work or eat a meal in a different place. · Cut down on stress.  Calm yourself or release tension by doing an activity you enjoy, such as reading a book, taking a hot bath, or gardening. · Talk to your doctor or pharmacist about nicotine replacement therapy, which replaces the nicotine in your body. You still get nicotine but you do not use tobacco. Nicotine replacement products help you slowly reduce the amount of nicotine you need. These products come in several forms, many of them available over-the-counter:  ¨ Nicotine patches  ¨ Nicotine gum and lozenges  ¨ Nicotine inhaler  · Ask your doctor about bupropion (Wellbutrin) or varenicline (Chantix), which are prescription medicines. They do not contain nicotine. They help you by reducing withdrawal symptoms, such as stress and anxiety. · Some people find hypnosis, acupuncture, and massage helpful for ending the smoking habit. · Eat a healthy diet and get regular exercise. Having healthy habits will help your body move past its craving for nicotine. · Be prepared to keep trying. Most people are not successful the first few times they try to quit. Do not get mad at yourself if you smoke again. Make a list of things you learned and think about when you want to try again, such as next week, next month, or next year. Where can you learn more? Go to http://ireneGlokalisejing.info/. Enter F954 in the search box to learn more about \"Stopping Smoking: Care Instructions. \"  Current as of: March 20, 2017  Content Version: 11.4  © 3762-7041 [employer]. Care instructions adapted under license by [employer] (which disclaims liability or warranty for this information). If you have questions about a medical condition or this instruction, always ask your healthcare professional. [address] any warranty or liability for your use of this information. We hope that we have addressed all of your medical concerns.  The examination and treatment you received in the Emergency Department were for an emergent problem and were not intended as complete care. It is important that you follow up with your healthcare provider(s) for ongoing care. If your symptoms worsen or do not improve as expected, and you are unable to reach your usual health care provider(s), you should return to the Emergency Department. Today's healthcare is undergoing tremendous change, and patient satisfaction surveys are one of the many tools to assess the quality of medical care. You may receive a survey from the [employer] regarding your experience in the Emergency Department. I hope that your experience has been completely positive, particularly the medical care that I provided. As such, please participate in the survey; anything less than excellent does not meet my expectations or intentions. [facility] and [facility] participate in nationally recognized quality of care measures. If your blood pressure is greater than 120/80, as reported below, we urge that you seek medical care to address the potential of high blood pressure, commonly known as hypertension. Hypertension can be hereditary or can be caused by certain medical conditions, pain, stress, or \"white coat syndrome. \"       Please make an appointment with your health care provider(s) for follow up of your Emergency Department visit. VITALS:   Patient Vitals for the past 8 hrs:   Temp Pulse Resp BP SpO2   02/07/18 1339 98.7 °F (37.1 °C) 78 18 137/81 100 %          Thank you for allowing us to provide you with medical care today. We realize that you have many choices for your emergency care needs. Please choose us in the future for any continued health care needs. [address], [address]: [phone]            No results found for this or any previous visit (from the past 24 hour(s)).     Xr Chest Pa Lat    Result Date: 2/7/2018  Exam:  2 view chest Indication: Cough, fever PA and lateral views demonstrate normal heart size. There is no acute process in the lung fields. The osseous structures are unremarkable.      Impression: Normal exam.

## 2018-02-16 ENCOUNTER — OFFICE VISIT (OUTPATIENT)
Dept: NEUROLOGY | Age: 38
End: 2018-02-16

## 2018-02-16 VITALS
DIASTOLIC BLOOD PRESSURE: 70 MMHG | WEIGHT: 176 LBS | OXYGEN SATURATION: 98 % | HEART RATE: 68 BPM | HEIGHT: 62 IN | SYSTOLIC BLOOD PRESSURE: 108 MMHG | BODY MASS INDEX: 32.39 KG/M2

## 2018-02-16 DIAGNOSIS — G43.909 MIGRAINE WITHOUT STATUS MIGRAINOSUS, NOT INTRACTABLE, UNSPECIFIED MIGRAINE TYPE: Primary | ICD-10-CM

## 2018-02-16 RX ORDER — LAMOTRIGINE 25 MG/1
TABLET ORAL
COMMUNITY

## 2018-02-16 RX ORDER — ONDANSETRON 4 MG/1
4 TABLET, FILM COATED ORAL
Qty: 30 TAB | Refills: 5 | Status: SHIPPED | OUTPATIENT
Start: 2018-02-16 | End: 2018-05-16

## 2018-02-16 NOTE — PROGRESS NOTES
Follow up for migraines. Patient states they are brought on by bright flashing lights, in which she tries to avoid.

## 2018-02-16 NOTE — PROGRESS NOTES
Qian Mooney is a 44 y.o. female who presents with the following  Chief Complaint   Patient presents with    Migraine       HPI Connor comes in for a follow up for migraines and LOC spells. No LOC spells since coming off the Zoloft. She has not lost bowel, bladder, or had any syncope since stopping this. She is seeing psychiatry and was just started on Lamictal. Doing well with this. Her mood is stable. . Only 1 bad migraine since last visit and since coming off Zoloft. Located in entire head. Has associated dizziness, visual disturbance where she will see flashing lights, nausea, light sound smell sensitive. Also has visual concerns. on Topamax 100 mg BID and Depakote 500 mg nightly. Headache was not as painful or intense. She used imitrex tablets for abortive therapy and these work well usually after 1 tablet in about 3 hours. zembrace for the really bad one but she doesn't like shots. Stress is increased as her daughter is sick and starting new medications and counseling. Allergies   Allergen Reactions    Latex Rash    Penicillins Anaphylaxis    Shellfish Derived Anaphylaxis    Pcn [Penicillins] Unknown (comments)    Tree Nut Swelling    Zoloft [Sertraline] Diarrhea       Current Outpatient Prescriptions   Medication Sig    lamoTRIgine (LAMICTAL) 25 mg tablet Take  by mouth. 50 mg BID    ondansetron hcl (ZOFRAN) 4 mg tablet Take 1 Tab by mouth every eight (8) hours as needed for Nausea.  ondansetron (ZOFRAN ODT) 4 mg disintegrating tablet Take 1 Tab by mouth every eight (8) hours as needed for Nausea.  SUMAtriptan succinate (ZEMBRACE SYMTOUCH) 3 mg/0.5 mL pnij 1 Syringe by SubCUTAneous route as needed. May repeat every hour X 4 doses. Max 4 doses in 24 hours.  divalproex ER (DEPAKOTE ER) 250 mg ER tablet Take 2 tablets by mouth nightly.  diclofenac EC (VOLTAREN) 75 mg EC tablet Take 1 Tab by mouth two (2) times a day.     topiramate (TOPAMAX) 100 mg tablet Take 1 Tab by mouth two (2) times a day.  SUMAtriptan (IMITREX) 100 mg tablet 1 at HA onset and repeat in 2 hours if needed. Max 2 in 24 hours    SUMAtriptan (IMITREX) 100 mg tablet 1 at HA onset and repeat in 2 hours if needed. Max 2 in 24 hours    diazePAM (VALIUM) 5 mg tablet 5 mg three (3) times daily as needed.  triamcinolone acetonide (KENALOG) 0.5 % ointment      No current facility-administered medications for this visit. History   Smoking Status    Current Every Day Smoker    Packs/day: 0.50   Smokeless Tobacco    Never Used       Past Medical History:   Diagnosis Date    Anxiety     Anxiety     Chest pain     Depression     Diabetes ([address]     Fatigue     Headache     Headache(784.0)     Ill-defined condition     \"cervicle stenosis\"    Memory loss     Neurological disorder     siezures    Neuropathy     PTSD (post-traumatic stress disorder)     Rash     Ringing in ears     Ringing in ears     Skipped beats     Snoring     Visual disturbance        Past Surgical History:   Procedure Laterality Date    HX GYN         Family History   Problem Relation Age of Onset    Headache Mother     Neuropathy Mother     Cancer Father     Seizures Other     Heart Disease Other     Stroke Other     Seizures Maternal Grandmother        Social History     Social History    Marital status: SINGLE     Spouse name: N/A    Number of children: N/A    Years of education: N/A     Social History Main Topics    Smoking status: Current Every Day Smoker     Packs/day: 0.50    Smokeless tobacco: Never Used    Alcohol use Yes      Comment: weekends    Drug use: No    Sexual activity: Not Asked     Other Topics Concern    None     Social History Narrative       Review of Systems   Eyes: Positive for blurred vision and photophobia. Negative for double vision. Cardiovascular: Negative for chest pain and palpitations. Neurological: Positive for dizziness and headaches.  Negative for seizures and loss of consciousness. Remainder of comprehensive review is negative. Physical Exam :    Visit Vitals    /70 (BP 1 Location: Left arm, BP Patient Position: Sitting)    Pulse 68    Ht 5' 2\" (1.575 m)    Wt 79.8 kg (176 lb)    SpO2 98%    BMI 32.19 kg/m2       General: Well defined, nourished, and groomed individual in no acute distress.    Neck: Supple, nontender, no bruits, no pain with resistance to active range of motion.    Heart: Regular rate and rhythm, no murmurs, rub, or gallop. Normal S1S2. Lungs: Clear to auscultation bilaterally with equal chest expansion, no cough, no wheeze  Musculoskeletal: Extremities revealed no edema and had full range of motion of joints.    Psych: Good mood and bright affect    NEUROLOGICAL EXAMINATION:    Mental Status: Alert and oriented to person, place, and time    Cranial Nerves:    II, III, IV, VI: Visual acuity grossly intact. Visual fields are normal.    Pupils are equal, round, and reactive to light and accommodation.    Extra-ocular movements are full and fluid. Fundoscopic exam was benign, no ptosis or nystagmus.    V-XII: Hearing is grossly intact. Facial features are symmetric, with normal sensation and strength. The palate rises symmetrically and the tongue protrudes midline. Sternocleidomastoids 5/5. Motor Examination: Normal tone, bulk, and strength, 5/5 muscle strength throughout. Coordination: Finger to nose was normal. No resting or intention tremor    Gait and Station: Steady while walking. Normal arm swing. No pronator drift. No muscle wasting or fasiculations noted. Reflexes: DTRs 2+ throughout.             Results for orders placed or performed in visit on 12/15/17   CBC WITH AUTOMATED DIFF   Result Value Ref Range    WBC 9.5 3.4 - 10.8 x10E3/uL    RBC 4.12 3.77 - 5.28 x10E6/uL    HGB 13.0 11.1 - 15.9 g/dL    HCT 39.2 34.0 - 46.6 %    MCV 95 79 - 97 fL    MCH 31.6 26.6 - 33.0 pg    MCHC 33.2 31.5 - 35.7 g/dL    RDW 12.7 12.3 - 15.4 % PLATELET 183 667 - 455 x10E3/uL    NEUTROPHILS 51 Not Estab. %    Lymphocytes 40 Not Estab. %    MONOCYTES 6 Not Estab. %    EOSINOPHILS 3 Not Estab. %    BASOPHILS 0 Not Estab. %    ABS. NEUTROPHILS 4.8 1.4 - 7.0 x10E3/uL    Abs Lymphocytes 3.8 (H) 0.7 - 3.1 x10E3/uL    ABS. MONOCYTES 0.6 0.1 - 0.9 x10E3/uL    ABS. EOSINOPHILS 0.2 0.0 - 0.4 x10E3/uL    ABS. BASOPHILS 0.0 0.0 - 0.2 x10E3/uL    IMMATURE GRANULOCYTES 0 Not Estab. %    ABS. IMM. GRANS. 0.0 0.0 - 0.1 x10E3/uL   IRON   Result Value Ref Range    Iron 151 27 - 159 ug/dL   FERRITIN   Result Value Ref Range    Ferritin 203 (H) 15 - 150 ng/mL       Orders Placed This Encounter    lamoTRIgine (LAMICTAL) 25 mg tablet     Sig: Take  by mouth. 50 mg BID    ondansetron hcl (ZOFRAN) 4 mg tablet     Sig: Take 1 Tab by mouth every eight (8) hours as needed for Nausea. Dispense:  30 Tab     Refill:  5       1. Migraine without status migrainosus, not intractable, unspecified migraine type        Follow-up Disposition:  Return in about 3 months (around 5/16/2018). Migraines are doing well with Depakote and Topamax. Imitrex for abortive therapy. Keep these as only 1 since coming off Zoloft. Syncope episodes seem to have stopped since coming off this also. Added to allergy list. Continue to track her headaches as stress is a trigger. zofran for PRN nausea. Call with any changes.  Happy with progression       This note will not be viewable in [employer]

## 2018-02-16 NOTE — MR AVS SNAPSHOT
[address] [facility] [address] [facility] [address] [phone] Patient: Valente Sorenson MRN: [id number] ISSAC:[birth year] Visit Information Date & Time Provider Department Dept. Phone Encounter #  
 2/16/2018  2:00 PM Mariah Espinoza NP [facility] Neurology [facility] [phone] [id number] Follow-up Instructions Return in about 3 months (around 5/16/2018). Your Appointments 2/28/2018  9:25 AM  
New Patient with Leesa Rowell MD  
[address] [place] [address]) Appt Note: NP EST PCP W CPE CP$0 SJM 2.12.2018  
 [address] [facility] [address]  
[phone]  
  
   
 [address] [facility] [address] [phone] Upcoming Health Maintenance Date Due Pneumococcal 19-64 Medium Risk (1 of 1 - PPSV23) 3/3/1998 PAP AKA CERVICAL CYTOLOGY 3/3/2000 Influenza Age 5 to Adult 8/1/2017 DTaP/Tdap/Td series (2 - Td) 11/26/2027 Allergies as of 2/16/2018  Review Complete On: 2/7/2018 By: Laila Jeffrey RN Severity Noted Reaction Type Reactions Latex  11/30/2017    Rash Penicillins High 11/26/2017   Systemic Anaphylaxis Shellfish Derived High 11/30/2017    Anaphylaxis Pcn [Penicillins]  03/28/2014    Unknown (comments) Tree Nut  11/30/2017    Swelling Zoloft [Sertraline]  02/16/2018    Diarrhea Current Immunizations  Reviewed on 12/1/2017 Name Date Tdap 11/26/2017 11:05 PM  
  
 Not reviewed this visit You Were Diagnosed With   
  
 Codes Comments Migraine without status migrainosus, not intractable, unspecified migraine type    -  Primary ICD-10-CM: N35.154 ICD-9-CM: 346.90 Vitals BP Pulse Height(growth percentile) Weight(growth percentile) SpO2 BMI  
 108/70 (BP 1 Location: Left arm, BP Patient Position: Sitting) 68 5' 2\" (1.575 m) 176 lb (79.8 kg) 98% 32.19 kg/m2 OB Status Smoking Status IUD Current Every Day Smoker BMI and BSA Data Body Mass Index Body Surface Area  
 32.19 kg/m 2 1.87 m 2 Preferred Pharmacy Pharmacy Name Phone [address] [place] [address] [phone] Your Updated Medication List  
  
   
This list is accurate as of: 2/16/18  2:08 PM.  Always use your most recent med list.  
  
  
  
  
 diazePAM 5 mg tablet Commonly known as:  VALIUM  
5 mg three (3) times daily as needed. diclofenac EC 75 mg EC tablet Commonly known as:  VOLTAREN Take 1 Tab by mouth two (2) times a day. divalproex  mg ER tablet Commonly known as:  DEPAKOTE ER Take 2 tablets by mouth nightly. LaMICtal 25 mg tablet Generic drug:  lamoTRIgine Take  by mouth. 50 mg BID  
  
 ondansetron 4 mg disintegrating tablet Commonly known as:  ZOFRAN ODT Take 1 Tab by mouth every eight (8) hours as needed for Nausea. ondansetron hcl 4 mg tablet Commonly known as:  Henreitta Michael Take 1 Tab by mouth every eight (8) hours as needed for Nausea. * SUMAtriptan 100 mg tablet Commonly known as:  IMITREX  
1 at HA onset and repeat in 2 hours if needed. Max 2 in 24 hours * SUMAtriptan 100 mg tablet Commonly known as:  IMITREX  
1 at HA onset and repeat in 2 hours if needed. Max 2 in 24 hours * SUMAtriptan succinate 3 mg/0.5 mL Pnij Commonly known as:  Amarilis Pew  
1 Syringe by SubCUTAneous route as needed. May repeat every hour X 4 doses. Max 4 doses in 24 hours. topiramate 100 mg tablet Commonly known as:  TOPAMAX Take 1 Tab by mouth two (2) times a day. triamcinolone acetonide 0.5 % ointment Commonly known as:  KENALOG * Notice: This list has 3 medication(s) that are the same as other medications prescribed for you. Read the directions carefully, and ask your doctor or other care provider to review them with you. Prescriptions Sent to Pharmacy Refills  
 ondansetron hcl (ZOFRAN) 4 mg tablet 5 Sig: Take 1 Tab by mouth every eight (8) hours as needed for Nausea. Class: Normal  
 Pharmacy: [address], [address] [place] [address] #: [phone] Route: Oral  
  
Follow-up Instructions Return in about 3 months (around 5/16/2018). Introducing [facility]! Dear Marce Bernard: 
Thank you for requesting a [employer] account. Our records indicate that you already have an active [employer] account. You can access your account anytime at [email or web address] Did you know that you can access your hospital and ER discharge instructions at any time in [employer]? You can also review all of your test results from your hospital stay or ER visit. Additional Information If you have questions, please visit the Frequently Asked Questions section of the [employer] website at [email or web address] Remember, [employer] is NOT to be used for urgent needs. For medical emergencies, dial 911. Now available from your iPhone and Android! Please provide this summary of care documentation to your next provider. Your primary care clinician is listed as Fonda Gitelman. If you have any questions after today's visit, please call [phone].

## 2018-05-16 ENCOUNTER — OFFICE VISIT (OUTPATIENT)
Dept: NEUROLOGY | Age: 38
End: 2018-05-16

## 2018-05-16 VITALS
HEIGHT: 62 IN | OXYGEN SATURATION: 99 % | RESPIRATION RATE: 17 BRPM | SYSTOLIC BLOOD PRESSURE: 110 MMHG | HEART RATE: 76 BPM | TEMPERATURE: 98 F | DIASTOLIC BLOOD PRESSURE: 72 MMHG | BODY MASS INDEX: 29.44 KG/M2 | WEIGHT: 160 LBS

## 2018-05-16 DIAGNOSIS — G43.919 INTRACTABLE MIGRAINE WITHOUT STATUS MIGRAINOSUS, UNSPECIFIED MIGRAINE TYPE: ICD-10-CM

## 2018-05-16 DIAGNOSIS — M54.2 NECK PAIN: Primary | ICD-10-CM

## 2018-05-16 RX ORDER — TOPIRAMATE 100 MG/1
100 TABLET, FILM COATED ORAL 2 TIMES DAILY
Qty: 60 TAB | Refills: 5 | Status: SHIPPED | OUTPATIENT
Start: 2018-05-16

## 2018-05-16 RX ORDER — PREDNISONE 10 MG/1
TABLET ORAL
Qty: 42 TAB | Refills: 0 | Status: SHIPPED | OUTPATIENT
Start: 2018-05-16 | End: 2018-06-27

## 2018-05-16 RX ORDER — CYCLOBENZAPRINE HCL 10 MG
TABLET ORAL
Qty: 30 TAB | Refills: 5 | Status: SHIPPED | OUTPATIENT
Start: 2018-05-16

## 2018-05-16 NOTE — PROGRESS NOTES
Reina Sy is a 43 y.o. female who presents with the following  Chief Complaint   Patient presents with    Migraine     follow up       HPI Connor comes in for a follow up for migraines and a new complaint of neck pain and left arm pain. States they have decreased since last visit and only having about 2-3 a month. No LOC spells. She is seeing psychiatry and was just started on Lamictal. Doing well with this. Migraines are located in entire head. Has associated dizziness, visual disturbance where she will see flashing lights, nausea, light sound smell sensitive. Also has visual concerns. on Topamax 100 mg BID and Depakote 500 mg nightly. Headache was not as painful or intense. She used imitrex tablets for abortive therapy and these work well usually after 1 tablet in about 3 hours. zembrace for the really bad one but she doesn't like shots. Getting to the gym more and has noticed her neck has been bothering her on the left side and down into the left arm she has been having pain. She does feel like her neck is very tight and that she has pain with turning side to side and flexed and extended. Does not recall any injury. [facility] and wants to get back to be evaluate again. Feeling a lot better overall. Allergies   Allergen Reactions    Latex Rash    Penicillins Anaphylaxis    Shellfish Derived Anaphylaxis    Pcn [Penicillins] Unknown (comments)    Tree Nut Swelling    Zoloft [Sertraline] Diarrhea       Current Outpatient Prescriptions   Medication Sig    topiramate (TOPAMAX) 100 mg tablet Take 1 Tab by mouth two (2) times a day.  predniSONE (DELTASONE) 10 mg tablet 6 po x2 days, 5 po x 2days, 4 po x 2days, 3 po x2days, 2 po x2days, 1 po x 2days    cyclobenzaprine (FLEXERIL) 10 mg tablet Take  1 tablet by mouth nightly PRN    lamoTRIgine (LAMICTAL) 25 mg tablet Take  by mouth.  50 mg BID    ondansetron (ZOFRAN ODT) 4 mg disintegrating tablet Take 1 Tab by mouth every eight (8) hours as needed for Nausea.  SUMAtriptan succinate (ZEMBRACE SYMTOUCH) 3 mg/0.5 mL pnij 1 Syringe by SubCUTAneous route as needed. May repeat every hour X 4 doses. Max 4 doses in 24 hours.  divalproex ER (DEPAKOTE ER) 250 mg ER tablet Take 2 tablets by mouth nightly.  diclofenac EC (VOLTAREN) 75 mg EC tablet Take 1 Tab by mouth two (2) times a day.  SUMAtriptan (IMITREX) 100 mg tablet 1 at HA onset and repeat in 2 hours if needed. Max 2 in 24 hours    triamcinolone acetonide (KENALOG) 0.5 % ointment      No current facility-administered medications for this visit.         History   Smoking Status    Current Every Day Smoker    Packs/day: 0.50   Smokeless Tobacco    Never Used       Past Medical History:   Diagnosis Date    Anxiety     Anxiety     Chest pain     Depression     Diabetes ([facility] [address]     Fatigue     Headache     Headache(784.0)     Ill-defined condition     \"cervicle stenosis\"    Memory loss     Neurological disorder     siezures    Neuropathy     PTSD (post-traumatic stress disorder)     Rash     Ringing in ears     Ringing in ears     Skipped beats     Snoring     Visual disturbance        Past Surgical History:   Procedure Laterality Date    HX GYN         Family History   Problem Relation Age of Onset    Headache Mother     Neuropathy Mother     Cancer Father     Seizures Other     Heart Disease Other     Stroke Other     Seizures Maternal Grandmother        Social History     Social History    Marital status: SINGLE     Spouse name: N/A    Number of children: N/A    Years of education: N/A     Social History Main Topics    Smoking status: Current Every Day Smoker     Packs/day: 0.50    Smokeless tobacco: Never Used    Alcohol use Yes      Comment: weekends    Drug use: No    Sexual activity: Not Asked     Other Topics Concern    None     Social History Narrative       Review of Systems   Eyes: Positive for blurred vision and photophobia. Gastrointestinal: Negative for nausea and vomiting. Musculoskeletal: Positive for neck pain. Neurological: Positive for dizziness and headaches. Negative for seizures, loss of consciousness and weakness. Remainder of comprehensive review is negative. Physical Exam :    Visit Vitals    /72    Pulse 76    Temp 98 °F (36.7 °C)    Resp 17    Ht 5' 2\" (1.575 m)    Wt 72.6 kg (160 lb)    SpO2 99%    BMI 29.26 kg/m2       General: Well defined, nourished, and groomed individual in no acute distress.    Neck:  Pain with flexion, extension, and resistance side to side  Psych: Good mood and bright affect    NEUROLOGICAL EXAMINATION:    Mental Status: Alert and oriented to person, place, and time    Cranial Nerves:    II, III, IV, VI: Visual acuity grossly intact. Visual fields are normal.    Pupils are equal, round, and reactive to light and accommodation.    Extra-ocular movements are full and fluid. Fundoscopic exam was benign, no ptosis or nystagmus.    V-XII: Hearing is grossly intact. Facial features are symmetric, with normal sensation and strength. The palate rises symmetrically and the tongue protrudes midline. Sternocleidomastoids 5/5. Motor Examination: Normal tone, bulk, and strength, 5/5 muscle strength throughout. Coordination: Finger to nose was normal. No resting or intention tremor    Gait and Station: Steady while walking. Normal arm swing. No pronator drift. No muscle wasting or fasiculations noted. Reflexes: DTRs 2+ throughout.             Results for orders placed or performed in visit on 12/15/17   CBC WITH AUTOMATED DIFF   Result Value Ref Range    WBC 9.5 3.4 - 10.8 x10E3/uL    RBC 4.12 3.77 - 5.28 x10E6/uL    HGB 13.0 11.1 - 15.9 g/dL    HCT 39.2 34.0 - 46.6 %    MCV 95 79 - 97 fL    MCH 31.6 26.6 - 33.0 pg    MCHC 33.2 31.5 - 35.7 g/dL    RDW 12.7 12.3 - 15.4 %    PLATELET 753 529 - 509 x10E3/uL    NEUTROPHILS 51 Not Estab. %    Lymphocytes 40 Not Estab. %    MONOCYTES 6 Not Estab. %    EOSINOPHILS 3 Not Estab. %    BASOPHILS 0 Not Estab. %    ABS. NEUTROPHILS 4.8 1.4 - 7.0 x10E3/uL    Abs Lymphocytes 3.8 (H) 0.7 - 3.1 x10E3/uL    ABS. MONOCYTES 0.6 0.1 - 0.9 x10E3/uL    ABS. EOSINOPHILS 0.2 0.0 - 0.4 x10E3/uL    ABS. BASOPHILS 0.0 0.0 - 0.2 x10E3/uL    IMMATURE GRANULOCYTES 0 Not Estab. %    ABS. IMM. GRANS. 0.0 0.0 - 0.1 x10E3/uL   IRON   Result Value Ref Range    Iron 151 27 - 159 ug/dL   FERRITIN   Result Value Ref Range    Ferritin 203 (H) 15 - 150 ng/mL       Orders Placed This Encounter    REFERRAL TO PHYSICAL THERAPY     Referral Priority:   Routine     Referral Type:   PT/OT/ST     Referral Reason:   Specialty Services Required    topiramate (TOPAMAX) 100 mg tablet     Sig: Take 1 Tab by mouth two (2) times a day. Dispense:  60 Tab     Refill:  5    predniSONE (DELTASONE) 10 mg tablet     Si po x2 days, 5 po x 2days, 4 po x 2days, 3 po x2days, 2 po x2days, 1 po x 2days     Dispense:  42 Tab     Refill:  0    cyclobenzaprine (FLEXERIL) 10 mg tablet     Sig: Take  1 tablet by mouth nightly PRN     Dispense:  30 Tab     Refill:  5       1. Neck pain    2. Intractable migraine without status migrainosus, unspecified migraine type        Follow-up Disposition:  Return in about 6 weeks (around 2018). Migraines. Keep current Topamax, Depakote for prevention. She will continue to use imitrex PRN and Zembrace for the bad migraines if needed. Can try a short term steroid to help with neck pain and flexeril nightly PRN for breakthrough pain. Will get back in with PT to evaluate for TENS vs other treatment in neck pain. Can follow after.  Doing well         This note will not be viewable in [facility]

## 2018-05-16 NOTE — PATIENT INSTRUCTIONS
Information Regarding Testing     If you have physican order for a test or a medication denied by your insurance company, this does not mean the test or medication is not appropriate for you as that is a medical decision, not a decision to be made by an insurance company representative or by an [facility] physician who has not interviewed and examined you. This is a decision to be made between you and your physician. The denial of services is a contractual matter between you and your insurance company, not an issue between your physician and the insurance company. If your test or medication is denied, you can take the following steps to help resolve the issue:    1. File a complaint with the [facility] regarding your insurance company's denial of services ordered for you. You can do this either by calling them directly or by completing an on-line complaint form on the [employer]. This can be found at [email or web address]    2. Also file a formal complaint with your insurance company and ask to have the name of the person denying the service so that you may explore a legal option should you be harmed by this denial of service. Again, the fact the insurance company will not pay for the service does not mean it is not medically necessary and I would encourage you to follow through with the plan that was made with your physician    3. File a written complaint with your employer so your employer and benefit manager is aware of the poor coverage they are providing their employees. If you have medicare/medicaid, complain to your representative in the House and to your Bryan Fernandes.     Information Regarding Testing     If you have physican order for a test or a medication denied by your insurance company, this does not mean the test or medication is not appropriate for you as that is a medical decision, not a decision to be made by an insurance company representative or by an [employer] physician who has not interviewed and examined you. This is a decision to be made between you and your physician. The denial of services is a contractual matter between you and your insurance company, not an issue between your physician and the insurance company. If your test or medication is denied, you can take the following steps to help resolve the issue:    1. File a complaint with the [facility] of Insurance regarding your insurance company's denial of services ordered for you. You can do this either by calling them directly or by completing an on-line complaint form on the [facility]. This can be found at [email or web address]    2. Also file a formal complaint with your insurance company and ask to have the name of the person denying the service so that you may explore a legal option should you be harmed by this denial of service. Again, the fact the insurance company will not pay for the service does not mean it is not medically necessary and I would encourage you to follow through with the plan that was made with your physician    3. File a written complaint with your employer so your employer and benefit manager is aware of the poor coverage they are providing their employees. If you have medicare/medicaid, complain to your representative in the House and to your Bryan Fernandes. [facility]   Statement to Patients  April 1, 2014      In an effort to ensure the large volume of patient prescription refills is processed in the most efficient and expeditious manner, we are asking our patients to assist us by calling your Pharmacy for all prescription refills, this will include also your  Mail Order Pharmacy. The pharmacy will contact our office electronically to continue the refill process. Please do not wait until the last minute to call your pharmacy.  We need at least 48 hours (2days) to fill prescriptions. We also encourage you to call your pharmacy before going to  your prescription to make sure it is ready. With regard to controlled substance prescription refill requests (narcotic refills) that need to be picked up at our office, we ask your cooperation by providing us with at least 72 hours (3days) notice that you will need a refill. We will not refill narcotic prescription refill requests after 4:00pm on any weekday, Monday through Thursday, or after 2:00pm on Fridays, or on the weekends. We encourage everyone to explore another way of getting your prescription refill request processed using [employer], our patient web portal through our electronic medical record system. [employer] is an efficient and effective way to communicate your medication request directly to the office and  downloadable as an noel on your smart phone . [employer] also features a review functionality that allows you to view your medication list as well as leave messages for your physician. Are you ready to get connected? If so please review the attatched instructions or speak to any of our staff to get you set up right away! Thank you so much for your cooperation. Should you have any questions please contact our Practice Administrator. The Physicians and Staff,  [facility]     If we have ordered testing for you, we do not call patients with results and we do not give test results over the phone. We schedule follow up appointments so that your results can be discussed in person and any questions you have regarding them may be addressed. If something of concern is revealed on your test, we will call you for a sooner follow up appointment. Additionally, results may be found by using the My Chart feature and one of our patient service representatives at the  can give you instructions on how to access this feature of our electronic medical record system.            Learning About Living Kristen Browning  What is a living will? A living will is a legal form you use to write down the kind of care you want at the end of your life. It is used by the health professionals who will treat you if you aren't able to decide for yourself. If you put your wishes in writing, your loved ones and others will know what kind of care you want. They won't need to guess. This can ease your mind and be helpful to others. A living will is not the same as an estate or property will. An estate will explains what you want to happen with your money and property after you die. Is a living will a legal document? A living will is a legal document. Each state has its own laws about living naqvi. If you move to another state, make sure that your living will is legal in the state where you now live. Or you might use a universal form that has been approved by many states. This kind of form can sometimes be completed and stored online. Your electronic copy will then be available wherever you have a connection to the Internet. In most cases, doctors will respect your wishes even if you have a form from a different state. · You don't need an  to complete a living will. But legal advice can be helpful if your state's laws are unclear, your health history is complicated, or your family can't agree on what should be in your living will. · You can change your living will at any time. Some people find that their wishes about end-of-life care change as their health changes. · In addition to making a living will, think about completing a medical power of  form. This form lets you name the person you want to make end-of-life treatment decisions for you (your \"health care agent\") if you're not able to. Many hospitals and nursing homes will give you the forms you need to complete a living will and a medical power of . · Your living will is used only if you can't make or communicate decisions for yourself anymore.  If you become able to make decisions again, you can accept or refuse any treatment, no matter what you wrote in your living will. · Your state may offer an online registry. This is a place where you can store your living will online so the doctors and nurses who need to treat you can find it right away. What should you think about when creating a living will? Talk about your end-of-life wishes with your family members and your doctor. Let them know what you want. That way the people making decisions for you won't be surprised by your choices. Think about these questions as you make your living will:  · Do you know enough about life support methods that might be used? If not, talk to your doctor so you know what might be done if you can't breathe on your own, your heart stops, or you're unable to swallow. · What things would you still want to be able to do after you receive life-support methods? Would you want to be able to walk? To speak? To eat on your own? To live without the help of machines? · If you have a choice, where do you want to be cared for? In your home? At a hospital or nursing home? · Do you want certain Baptism practices performed if you become very ill? · If you have a choice at the end of your life, where would you prefer to die? At home? In a hospital or nursing home? Somewhere else? · Would you prefer to be buried or cremated? · Do you want your organs to be donated after you die? What should you do with your living will? · Make sure that your family members and your health care agent have copies of your living will. · Give your doctor a copy of your living will to keep in your medical record. If you have more than one doctor, make sure that each one has a copy. · You may want to put a copy of your living will where it can be easily found. Where can you learn more? Go to http://irene-jing.info/.   Enter Q228 in the search box to learn more about \"Learning About Living Dino Gonzalez. \"  Current as of: September 24, 2016  Content Version: 11.4  © 2420-0497 [employer], [employer]. Care instructions adapted under license by [employer] (which disclaims liability or warranty for this information). If you have questions about a medical condition or this instruction, always ask your healthcare professional. [place] [address] any warranty or liability for your use of this information.

## 2018-05-16 NOTE — PROGRESS NOTES
Follow up for migraines. Reports decrease in frequency of migraines. Reports 2 headache days per month. Reports neck and back pain for past few months.

## 2018-05-16 NOTE — MR AVS SNAPSHOT
[facility] 
 
 
 [facility] [address] [place] [address] [phone] Patient: Joseph Hogan MRN: [id number] [facility]:[id number]/0/9386 Visit Information Date & Time Provider Department Dept. Phone Encounter #  
 5/16/2018  1:30 PM HERBIE Miguel [facility] [phone] [id number] Follow-up Instructions Return in about 6 weeks (around 6/27/2018). Upcoming Health Maintenance Date Due Pneumococcal 19-64 Medium Risk (1 of 1 - PPSV23) 3/3/1998 PAP AKA CERVICAL CYTOLOGY 3/3/2000 Influenza Age 5 to Adult 8/1/2018 DTaP/Tdap/Td series (2 - Td) 11/26/2027 Allergies as of 5/16/2018  Review Complete On: 5/16/2018 By: Hernandez Mcginnis LPN Severity Noted Reaction Type Reactions Latex  11/30/2017    Rash Penicillins High 11/26/2017   Systemic Anaphylaxis Shellfish Derived High 11/30/2017    Anaphylaxis Pcn [Penicillins]  03/28/2014    Unknown (comments) Tree Nut  11/30/2017    Swelling Zoloft [Sertraline]  02/16/2018    Diarrhea Current Immunizations  Reviewed on 12/1/2017 Name Date Tdap 11/26/2017 11:05 PM  
  
 Not reviewed this visit You Were Diagnosed With   
  
 Codes Comments Neck pain    -  Primary ICD-10-CM: M54.2 ICD-9-CM: 723.1 Intractable migraine without status migrainosus, unspecified migraine type     ICD-10-CM: G43.919 ICD-9-CM: 346.91 Vitals BP Pulse Temp Resp Height(growth percentile) Weight(growth percentile) 110/72 76 98 °F (36.7 °C) 17 5' 2\" (1.575 m) 160 lb (72.6 kg) SpO2 BMI OB Status Smoking Status 99% 29.26 kg/m2 IUD Current Every Day Smoker Vitals History BMI and BSA Data Body Mass Index Body Surface Area  
 29.26 kg/m 2 1.78 m 2 Preferred Pharmacy Pharmacy Name Phone [address] [phone] Your Updated Medication List  
  
   
This list is accurate as of 18  1:58 PM.  Always use your most recent med list.  
  
  
  
  
 cyclobenzaprine 10 mg tablet Commonly known as:  FLEXERIL Take  1 tablet by mouth nightly PRN  
  
 diclofenac EC 75 mg EC tablet Commonly known as:  VOLTAREN Take 1 Tab by mouth two (2) times a day. divalproex  mg ER tablet Commonly known as:  DEPAKOTE ER Take 2 tablets by mouth nightly. LaMICtal 25 mg tablet Generic drug:  lamoTRIgine Take  by mouth. 50 mg BID  
  
 ondansetron 4 mg disintegrating tablet Commonly known as:  ZOFRAN ODT Take 1 Tab by mouth every eight (8) hours as needed for Nausea. predniSONE 10 mg tablet Commonly known as:  DELTASONE  
6 po x2 days, 5 po x 2days, 4 po x 2days, 3 po x2days, 2 po x2days, 1 po x 2days * SUMAtriptan 100 mg tablet Commonly known as:  IMITREX  
1 at HA onset and repeat in 2 hours if needed. Max 2 in 24 hours * SUMAtriptan succinate 3 mg/0.5 mL Pnij Commonly known as:  Helga Biju  
1 Syringe by SubCUTAneous route as needed. May repeat every hour X 4 doses. Max 4 doses in 24 hours. topiramate 100 mg tablet Commonly known as:  TOPAMAX Take 1 Tab by mouth two (2) times a day. triamcinolone acetonide 0.5 % ointment Commonly known as:  KENALOG * Notice: This list has 2 medication(s) that are the same as other medications prescribed for you. Read the directions carefully, and ask your doctor or other care provider to review them with you. Prescriptions Sent to Pharmacy Refills  
 topiramate (TOPAMAX) 100 mg tablet 5 Sig: Take 1 Tab by mouth two (2) times a day. Class: Normal  
 Pharmacy: [address] [facility] [address] Ph #: [phone] Route: Oral  
 predniSONE (DELTASONE) 10 mg tablet 0 Si po x2 days, 5 po x 2days, 4 po x 2days, 3 po x2days, 2 po x2days, 1 po x 2days Class: Normal  
 Pharmacy: [address] Ph #: [phone]  
 cyclobenzaprine (FLEXERIL) 10 mg tablet 5 Sig: Take  1 tablet by mouth nightly PRN Class: Normal  
 Pharmacy: [address] Ph #: [phone] We Performed the Following REFERRAL TO PHYSICAL THERAPY [NZS58 Custom] Comments:  
 Evaluate for TENS treatment, neck pain Follow-up Instructions Return in about 6 weeks (around 6/27/2018). Referral Information Referral ID Referred By Referred To  
  
 [id number] Michelle Fitzpatrick Not Available Visits Status Start Date End Date 1 New Request 5/16/18 5/16/19 If your referral has a status of pending review or denied, additional information will be sent to support the outcome of this decision. Patient Instructions Information Regarding Testing If you have physican order for a test or a medication denied by your insurance company, this does not mean the test or medication is not appropriate for you as that is a medical decision, not a decision to be made by an insurance company representative or by an [facility] physician who has not interviewed and examined you. This is a decision to be made between you and your physician. The denial of services is a contractual matter between you and your insurance company, not an issue between your physician and the insurance company. If your test or medication is denied, you can take the following steps to help resolve the issue: 1. File a complaint with the [facility] regarding your insurance company's denial of services ordered for you. You can do this either by calling them directly or by completing an on-line complaint form on the [employer].   This can be found at [email or web address] 
 
 2.   Also file a formal complaint with your insurance company and ask to have the name of the person denying the service so that you may explore a legal option should you be harmed by this denial of service. Again, the fact the insurance company will not pay for the service does not mean it is not medically necessary and I would encourage you to follow through with the plan that was made with your physician 3. File a written complaint with your employer so your employer and benefit manager is aware of the poor coverage they are providing their employees. If you have medicare/medicaid, complain to your representative in the House and to your Bryan Fernandes. Information Regarding Testing If you have physican order for a test or a medication denied by your insurance company, this does not mean the test or medication is not appropriate for you as that is a medical decision, not a decision to be made by an insurance company representative or by an [facility] physician who has not interviewed and examined you. This is a decision to be made between you and your physician. The denial of services is a contractual matter between you and your insurance company, not an issue between your physician and the insurance company. If your test or medication is denied, you can take the following steps to help resolve the issue: 1. File a complaint with the [facility] regarding your insurance company's denial of services ordered for you. You can do this either by calling them directly or by completing an on-line complaint form on the [employer]. This can be found at www.[place].gov 2. Also file a formal complaint with your insurance company and ask to have the name of the person denying the service so that you may explore a legal option should you be harmed by this denial of service.   Again, the fact the insurance company will not pay for the service does not mean it is not medically necessary and I would encourage you to follow through with the plan that was made with your physician 3. File a written complaint with your employer so your employer and benefit manager is aware of the poor coverage they are providing their employees. If you have medicare/medicaid, complain to your representative in the House and to your Bryan Fernandes. PRESCRIPTION REFILL POLICY [facility] Statement to Patients April 1, 2014 In an effort to ensure the large volume of patient prescription refills is processed in the most efficient and expeditious manner, we are asking our patients to assist us by calling your Pharmacy for all prescription refills, this will include also your  Mail Order Pharmacy. The pharmacy will contact our office electronically to continue the refill process. Please do not wait until the last minute to call your pharmacy. We need at least 48 hours (2days) to fill prescriptions. We also encourage you to call your pharmacy before going to  your prescription to make sure it is ready. With regard to controlled substance prescription refill requests (narcotic refills) that need to be picked up at our office, we ask your cooperation by providing us with at least 72 hours (3days) notice that you will need a refill. We will not refill narcotic prescription refill requests after 4:00pm on any weekday, Monday through Thursday, or after 2:00pm on Fridays, or on the weekends. We encourage everyone to explore another way of getting your prescription refill request processed using [employer], our patient web portal through our electronic medical record system. [employer] is an efficient and effective way to communicate your medication request directly to the office and  downloadable as an noel on your smart phone .  [employer] also features a review functionality that allows you to view your medication list as well as leave messages for your physician. Are you ready to get connected? If so please review the attatched instructions or speak to any of our staff to get you set up right away! Thank you so much for your cooperation. Should you have any questions please contact our Practice Administrator. The Physicians and Staff,  [facility] If we have ordered testing for you, we do not call patients with results and we do not give test results over the phone. We schedule follow up appointments so that your results can be discussed in person and any questions you have regarding them may be addressed. If something of concern is revealed on your test, we will call you for a sooner follow up appointment. Additionally, results may be found by using the My Chart feature and one of our patient service representatives at the  can give you instructions on how to access this feature of our electronic medical record system. Eddie Watson 1721 What is a living will? A living will is a legal form you use to write down the kind of care you want at the end of your life. It is used by the health professionals who will treat you if you aren't able to decide for yourself. If you put your wishes in writing, your loved ones and others will know what kind of care you want. They won't need to guess. This can ease your mind and be helpful to others. A living will is not the same as an estate or property will. An estate will explains what you want to happen with your money and property after you die. Is a living will a legal document? A living will is a legal document. Each state has its own laws about living naqvi. If you move to another state, make sure that your living will is legal in the state where you now live. Or you might use a universal form that has been approved by many states.  This kind of form can sometimes be completed and stored online. Your electronic copy will then be available wherever you have a connection to the Internet. In most cases, doctors will respect your wishes even if you have a form from a different state. · You don't need an  to complete a living will. But legal advice can be helpful if your state's laws are unclear, your health history is complicated, or your family can't agree on what should be in your living will. · You can change your living will at any time. Some people find that their wishes about end-of-life care change as their health changes. · In addition to making a living will, think about completing a medical power of  form. This form lets you name the person you want to make end-of-life treatment decisions for you (your \"health care agent\") if you're not able to. Many hospitals and nursing homes will give you the forms you need to complete a living will and a medical power of . · Your living will is used only if you can't make or communicate decisions for yourself anymore. If you become able to make decisions again, you can accept or refuse any treatment, no matter what you wrote in your living will. · Your state may offer an online registry. This is a place where you can store your living will online so the doctors and nurses who need to treat you can find it right away. What should you think about when creating a living will? Talk about your end-of-life wishes with your family members and your doctor. Let them know what you want. That way the people making decisions for you won't be surprised by your choices. Think about these questions as you make your living will: · Do you know enough about life support methods that might be used? If not, talk to your doctor so you know what might be done if you can't breathe on your own, your heart stops, or you're unable to swallow.  
· What things would you still want to be able to do after you receive life-support methods? Would you want to be able to walk? To speak? To eat on your own? To live without the help of machines? · If you have a choice, where do you want to be cared for? In your home? At a hospital or nursing home? · Do you want certain Islam practices performed if you become very ill? · If you have a choice at the end of your life, where would you prefer to die? At home? In a hospital or nursing home? Somewhere else? · Would you prefer to be buried or cremated? · Do you want your organs to be donated after you die? What should you do with your living will? · Make sure that your family members and your health care agent have copies of your living will. · Give your doctor a copy of your living will to keep in your medical record. If you have more than one doctor, make sure that each one has a copy. · You may want to put a copy of your living will where it can be easily found. Where can you learn more? Go to http://irene-jing.info/. Enter X318 in the search box to learn more about \"Learning About Living Yuridia Slider. \" Current as of: September 24, 2016 Content Version: 11.4 © 6340-6643 Healthwise, Incorporated. Care instructions adapted under license by [employer] (which disclaims liability or warranty for this information). If you have questions about a medical condition or this instruction, always ask your healthcare professional. [place] [address] any warranty or liability for your use of this information. Introducing [facility]! Dear Bri Graf: 
Thank you for requesting a Prestadero account. Our records indicate that you already have an active Prestadero account. You can access your account anytime at [email or web address] Did you know that you can access your hospital and ER discharge instructions at any time in Prestadero? You can also review all of your test results from your hospital stay or ER visit. Additional Information If you have questions, please visit the Frequently Asked Questions section of the [employer] website at [email or web address] Remember, Z[employer] is NOT to be used for urgent needs. For medical emergencies, dial 911. Now available from your iPhone and Android! Please provide this summary of care documentation to your next provider. Your primary care clinician is listed as Shawna Rico. If you have any questions after today's visit, please call [phone].

## 2018-06-27 ENCOUNTER — OFFICE VISIT (OUTPATIENT)
Dept: NEUROLOGY | Age: 38
End: 2018-06-27

## 2018-06-27 VITALS
HEIGHT: 62 IN | DIASTOLIC BLOOD PRESSURE: 72 MMHG | BODY MASS INDEX: 28.34 KG/M2 | SYSTOLIC BLOOD PRESSURE: 118 MMHG | WEIGHT: 154 LBS

## 2018-06-27 DIAGNOSIS — G43.909 MIGRAINE WITHOUT STATUS MIGRAINOSUS, NOT INTRACTABLE, UNSPECIFIED MIGRAINE TYPE: Primary | ICD-10-CM

## 2018-06-27 RX ORDER — PREDNISONE 10 MG/1
TABLET ORAL
Qty: 42 TAB | Refills: 0 | Status: SHIPPED | OUTPATIENT
Start: 2018-06-27 | End: 2018-08-24

## 2018-06-27 RX ORDER — DIVALPROEX SODIUM 250 MG/1
TABLET, EXTENDED RELEASE ORAL
Qty: 90 TAB | Refills: 5 | Status: SHIPPED | OUTPATIENT
Start: 2018-06-27

## 2018-06-27 NOTE — PATIENT INSTRUCTIONS
[facility]   Statement to Patients  April 1, 2014      In an effort to ensure the large volume of patient prescription refills is processed in the most efficient and expeditious manner, we are asking our patients to assist us by calling your Pharmacy for all prescription refills, this will include also your  Mail Order Pharmacy. The pharmacy will contact our office electronically to continue the refill process. Please do not wait until the last minute to call your pharmacy. We need at least 48 hours (2days) to fill prescriptions. We also encourage you to call your pharmacy before going to  your prescription to make sure it is ready. With regard to controlled substance prescription refill requests (narcotic refills) that need to be picked up at our office, we ask your cooperation by providing us with at least 72 hours (3days) notice that you will need a refill. We will not refill narcotic prescription refill requests after 4:00pm on any weekday, Monday through Thursday, or after 2:00pm on Fridays, or on the weekends. We encourage everyone to explore another way of getting your prescription refill request processed using [employer], our patient web portal through our electronic medical record system. [employer] is an efficient and effective way to communicate your medication request directly to the office and  downloadable as an noel on your smart phone . [employer] also features a review functionality that allows you to view your medication list as well as leave messages for your physician. Are you ready to get connected? If so please review the attatched instructions or speak to any of our staff to get you set up right away! Thank you so much for your cooperation. Should you have any questions please contact our Practice Administrator.     The Physicians and Staff,  [facility]

## 2018-06-27 NOTE — MR AVS SNAPSHOT
[address] [facility] [address] [facility] [address] [facility] [id number] [phone] Patient: Roman Carrasco MRN: [id number] Visit Information Date & Time Provider Department Dept. Phone Encounter #  
 6/27/2018  1:00 PM Deane Duane,  [facility] Neurology [facility] [phone] [id number] Follow-up Instructions Return in about 2 months (around 8/27/2018). Upcoming Health Maintenance Date Due Pneumococcal 19-64 Medium Risk (1 of 1 - PPSV23) 3/3/1998 PAP AKA CERVICAL CYTOLOGY 3/3/2000 Influenza Age 5 to Adult 8/1/2018 DTaP/Tdap/Td series (2 - Td) 11/26/2027 Allergies as of 6/27/2018  Review Complete On: 6/27/2018 By: Celia Medrano Severity Noted Reaction Type Reactions Latex  11/30/2017    Rash Penicillins High 11/26/2017   Systemic Anaphylaxis Shellfish Derived High 11/30/2017    Anaphylaxis Pcn [Penicillins]  03/28/2014    Unknown (comments) Tree Nut  11/30/2017    Swelling Zoloft [Sertraline]  02/16/2018    Diarrhea Current Immunizations  Reviewed on 12/1/2017 Name Date Tdap 11/26/2017 11:05 PM  
  
 Not reviewed this visit You Were Diagnosed With   
  
 Codes Comments Migraine without status migrainosus, not intractable, unspecified migraine type    -  Primary ICD-10-CM: A01.053 ICD-9-CM: 346.90 Vitals BP Height(growth percentile) Weight(growth percentile) BMI OB Status Smoking Status 118/72 5' 2\" (1.575 m) 154 lb (69.9 kg) 28.17 kg/m2 IUD Current Every Day Smoker BMI and BSA Data Body Mass Index Body Surface Area  
 28.17 kg/m 2 1.75 m 2 Preferred Pharmacy Pharmacy Name Phone [address] [place] [address] [phone] Your Updated Medication List  
  
   
This list is accurate as of 6/27/18  1:33 PM.  Always use your most recent med list.  
  
  
  
  
 cyclobenzaprine 10 mg tablet Commonly known as:  FLEXERIL Take  1 tablet by mouth nightly PRN  
  
 diclofenac EC 75 mg EC tablet Commonly known as:  VOLTAREN Take 1 Tab by mouth two (2) times a day. divalproex  mg ER tablet Commonly known as:  DEPAKOTE ER Take 3 tablets by mouth nightly. LaMICtal 25 mg tablet Generic drug:  lamoTRIgine Take  by mouth. 50 mg BID  
  
 ondansetron 4 mg disintegrating tablet Commonly known as:  ZOFRAN ODT Take 1 Tab by mouth every eight (8) hours as needed for Nausea. predniSONE 10 mg tablet Commonly known as:  DELTASONE  
6 po x2 days, 5 po x 2days, 4 po x 2days, 3 po x2days, 2 po x2days, 1 po x 2days * SUMAtriptan 100 mg tablet Commonly known as:  IMITREX  
1 at HA onset and repeat in 2 hours if needed. Max 2 in 24 hours * SUMAtriptan succinate 3 mg/0.5 mL Pnij Commonly known as:  Benitez Cyclone  
1 Syringe by SubCUTAneous route as needed. May repeat every hour X 4 doses. Max 4 doses in 24 hours. topiramate 100 mg tablet Commonly known as:  TOPAMAX Take 1 Tab by mouth two (2) times a day. triamcinolone acetonide 0.5 % ointment Commonly known as:  KENALOG * Notice: This list has 2 medication(s) that are the same as other medications prescribed for you. Read the directions carefully, and ask your doctor or other care provider to review them with you. Prescriptions Sent to Pharmacy Refills  
 divalproex ER (DEPAKOTE ER) 250 mg ER tablet 5 Sig: Take 3 tablets by mouth nightly. Class: Normal  
 Pharmacy: [address] [place] [address] Ph #: [phone]  
 predniSONE (DELTASONE) 10 mg tablet 0 Si po x2 days, 5 po x 2days, 4 po x 2days, 3 po x2days, 2 po x2days, 1 po x 2days Class: Normal  
 Pharmacy: [address] [place] [address] Ph #: [phone] Follow-up Instructions Return in about 2 months (around 8/27/2018). To-Do List   
 07/03/2018 10:30 AM  
  Appointment with Mayra Hudson at [facility] PT Mihai Bustamante ([phone]) Patient Instructions PRESCRIPTION REFILL POLICY [facility] Statement to Patients April 1, 2014 In an effort to ensure the large volume of patient prescription refills is processed in the most efficient and expeditious manner, we are asking our patients to assist us by calling your Pharmacy for all prescription refills, this will include also your  Mail Order Pharmacy. The pharmacy will contact our office electronically to continue the refill process. Please do not wait until the last minute to call your pharmacy. We need at least 48 hours (2days) to fill prescriptions. We also encourage you to call your pharmacy before going to  your prescription to make sure it is ready. With regard to controlled substance prescription refill requests (narcotic refills) that need to be picked up at our office, we ask your cooperation by providing us with at least 72 hours (3days) notice that you will need a refill. We will not refill narcotic prescription refill requests after 4:00pm on any weekday, Monday through Thursday, or after 2:00pm on Fridays, or on the weekends. We encourage everyone to explore another way of getting your prescription refill request processed using Stion, our patient web portal through our electronic medical record system. Stion is an efficient and effective way to communicate your medication request directly to the office and  downloadable as an noel on your smart phone . Stion also features a review functionality that allows you to view your medication list as well as leave messages for your physician. Are you ready to get connected? If so please review the attatched instructions or speak to any of our staff to get you set up right away! Thank you so much for your cooperation. Should you have any questions please contact our Practice Administrator. The Physicians and Staff,  [facility] Introducing [facility] SERVICES! Dear Bri Graf: 
Thank you for requesting a Shipu account. Our records indicate that you already have an active Shipu account. You can access your account anytime at [email or web address] Did you know that you can access your hospital and ER discharge instructions at any time in Shipu? You can also review all of your test results from your hospital stay or ER visit. Additional Information If you have questions, please visit the Frequently Asked Questions section of the Shipu website at [email or web address] Remember, Shipu is NOT to be used for urgent needs. For medical emergencies, dial 911. Now available from your iPhone and Android! Please provide this summary of care documentation to your next provider. Your primary care clinician is listed as Pat Vaughan. If you have any questions after today's visit, please call [phone].

## 2018-07-03 ENCOUNTER — HOSPITAL ENCOUNTER (OUTPATIENT)
Dept: PHYSICAL THERAPY | Age: 38
Discharge: HOME | End: 2018-07-03
Payer: MEDICAID

## 2018-07-03 PROCEDURE — 97162 PT EVAL MOD COMPLEX 30 MIN: CPT | Performed by: PHYSICAL THERAPIST

## 2018-07-03 PROCEDURE — 97014 ELECTRIC STIMULATION THERAPY: CPT | Performed by: PHYSICAL THERAPIST

## 2018-07-03 PROCEDURE — 97110 THERAPEUTIC EXERCISES: CPT | Performed by: PHYSICAL THERAPIST

## 2018-07-03 NOTE — PROGRESS NOTES
[address] [facility] [place] [address] [place] [address]  Dioni Ratliff  Phone: [phone]  Fax: [phone]    Plan of Care/ Statement of Necessity for Physical Therapy Services 2-15    Patient name: Arnulfo Rowell  : 1980  Provider#: [id number]  Referral source: Jacky Nieto NP      Medical/Treatment Diagnosis: Neck pain [M54.2]     Prior Hospitalization: see medical history     Comorbidities: DM, Anxiety, Migraines, Arthritis, Cervical stenosis  Prior Level of Function: Pt enjoys working out on the treadmill and/ or elliptical and weight lifting, she has a 5year old daughter   Medications: Verified on Patient Summary List    Start of Care: 7/3/18      Onset Date: 2017       The Plan of Care and following information is based on the information from the initial evaluation. Assessment/ key information: The patient presents with signs and symptoms consistent with cervical strain and tension headaches complicated by complex PMH, inactive lifestyle and postural dysfunction. Evaluation Complexity History HIGH Complexity :3+ comorbidities / personal factors will impact the outcome/ POC ; Examination HIGH Complexity : 4+ Standardized tests and measures addressing body structure, function, activity limitation and / or participation in recreation  ;Presentation MEDIUM Complexity : Evolving with changing characteristics  ; Clinical Decision Making MEDIUM Complexity : FOTO score of 26-74  Overall Complexity Rating: MEDIUM    Problem List: pain affecting function, decrease ROM, decrease strength, impaired gait/ balance, decrease ADL/ functional abilitiies, decrease activity tolerance, decrease flexibility/ joint mobility and decrease transfer abilities   Treatment Plan may include any combination of the following: Therapeutic exercise, Neuromuscular re-education, Physical agent/modality, Manual therapy, Patient education and Functional mobility training  Patient / Family readiness to learn indicated by: asking questions, trying to perform skills and interest  Persons(s) to be included in education: patient (P)  Barriers to Learning/Limitations: None  Patient Goal (s): Mirza Smith  Patient Self Reported Health Status: good  Rehabilitation Potential: good    Short Term Goals: To be accomplished in 4 weeks:  1) The patient will be independent with introductory HEP  2) The patient will improve cervical rotation AROM to 65 deg to improve ease with ADLs  3) The patient will improve cervical extension AROM to 30 deg to improve ease with household chores  Long Term Goals: To be accomplished in 12 weeks:  1) The patient will demonstrate 5/5 BUE strength to improve ease with lifting tasks  2) The patient will report ability to sleep through the night without an increase in pain  3) The patient will report ability to complete  tasks without an increase in pain  Frequency / Duration: Patient to be seen 2 times per week for 12 weeks. Patient/ Caregiver education and instruction: self care, activity modification and exercises    [x]  Plan of care has been reviewed with ALEX Read PT 7/3/2018 11:07 AM    ________________________________________________________________________    I certify that the above Therapy Services are being furnished while the patient is under my care. I agree with the treatment plan and certify that this therapy is necessary.     [de-identified] Signature:____________________  Date:____________Time: _________

## 2018-07-03 NOTE — PROGRESS NOTES
PT INITIAL EVALUATION NOTE 2-15    Patient Name: Jena Mott  Date:7/3/2018  : 1980  [x]  Patient  Verified  Payor: Greta Seals / Plan: Bernardo Numbers / Product Type: Managed Care Medicaid /    In time:10:30 AM  Out time:11:35 AM  Total Treatment Time (min): 65  Visit #: 1     Treatment Area: Neck pain [M54.2]    SUBJECTIVE  Pain Level (0-10 scale): 6-7/10  At worst: 10/10, pain is increased by laying down to sleep, activity  At best: 5-6/10, pain is decreased by resting  Any medication changes, allergies to medications, adverse drug reactions, diagnosis change, or new procedure performed?: [] No    [x] Yes (see summary sheet for update)  Subjective:     Pt was last in PT in December, \"it helped some and then I started going to the gym but it never fully went away\"  Pt reports she started taking Flexeril at night which helps \"somewhat\" \"But I still toss and turn\"  MRI 2017 revealed  \"There is no evidence of cervical cord demyelinating disease. Mild multilevel disc and facet degenerative change. Mild canal and moderate to severe left foraminal stenosis at C5-6. Mild canal stenoses at C4-5 and C6-7. \"  Pt has migraines on a daily basis \"anything sets it off\" \"bright lights, loud noises  Pt reports she has numbness/ tingling at night  Pt c/o tinnitus (L)     PLOF: Pt enjoys working out on the treadmill and/ or elliptical and weight lifting   Mechanism of Injury: Insidious onset  Previous Treatment/Compliance: PT for one month in October  PMHx/Surgical Hx: DM, Anxiety, Migraines  Work Hx: Not working   Living Situation: Pt lives with her daughter and her parents  Pt Goals: Kermitt Sink relief\"  Barriers: Chronic nature of condition  Motivation: Good  Substance use: tobacco use, alcohol use  Cognition: A & O x 3        OBJECTIVE/EXAMINATION  Posture:   Forward head, rounded shoulders  Palpation: TTP R lats      UPPER QUARTER MUSCLE STRENGTH      R  L  C1, C2 Neck Flex  4-   C3 Side Flex   4  4   C4 Sh Elev   4  4   C5 Deltoid/Biceps  4  4   C6 Wrist Ext   5  5   C7 Triceps   4  4   C8 Thumb Ext   4+  4+   T1 Hand Inst   4+  4    Cervical AROM:        R  L    Flexion    45        Extension   20        Side Bending   25  16        Rotation   60  60    Upper Extremity AROM:          Flexion WNL  Extension WNL  Abduction WFL  External Rotation 50% p!  On R    Modality rationale: decrease inflammation, decrease pain and increase tissue extensibility to improve the patients ability to sit, stand, transfer, ambulate, lift, carry, reach, complete ADLs   Min Type Additional Details   15 [x] Estim: []Att   [x]Unatt        []TENS instruct                  []IFC  [x]Premod   []NMES                     []Other:  []w/US   []w/ice   [x]w/heat  Position: Supine  Location: c-spine    []  Traction: [] Cervical       []Lumbar                       [] Prone          []Supine                       []Intermittent   []Continuous Lbs:  [] before manual  [] after manual  []w/heat    []  Ultrasound: []Continuous   [] Pulsed at:                            []1MHz   []3MHz Location:  W/cm2:    []  Paraffin         Location:  []w/heat    []  Ice     []  Heat  []  Ice massage Position:  Location:    []  Laser  []  Other: Position:  Location:    []  Vasopneumatic Device Pressure:       [] lo [] med [] hi   Temperature:    [x] Skin assessment post-treatment:  [x]intact []redness- no adverse reaction    []redness  adverse reaction:     15 min Therapeutic Exercise:  [x] See flow sheet :   Rationale: increase ROM and increase strength to improve the patients ability to sit, stand, transfer, ambulate, lift, carry, reach, complete ADLs      With   [x] TE   [] TA   [x] neuro   [] other: Patient Education: [x] Review HEP    [] Progressed/Changed HEP based on:   [x] positioning   [x] body mechanics   [] transfers   [x] heat/ice application    [] other:        Pain Level (0-10 scale) post treatment: \"so much better\"    ASSESSMENT: [x]  See Plan of [address] [facility], PT 7/3/2018  10:30 AM

## 2018-07-05 ENCOUNTER — HOSPITAL ENCOUNTER (OUTPATIENT)
Dept: PHYSICAL THERAPY | Age: 38
Discharge: HOME | End: 2018-07-05
Payer: MEDICAID

## 2018-07-05 PROCEDURE — 97110 THERAPEUTIC EXERCISES: CPT | Performed by: PHYSICAL MEDICINE & REHABILITATION
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
## 2018-07-11 ENCOUNTER — HOSPITAL ENCOUNTER (OUTPATIENT)
Dept: PHYSICAL THERAPY | Age: 38
Discharge: HOME | End: 2018-07-11
Payer: MEDICAID

## 2018-07-11 PROCEDURE — 97110 THERAPEUTIC EXERCISES: CPT

## 2018-07-11 PROCEDURE — 97140 MANUAL THERAPY 1/> REGIONS: CPT

## 2018-07-11 NOTE — PROGRESS NOTES
PT DAILY TREATMENT NOTE - [facility] 2-15    Patient Name: Maura Altamirano  Date:2018  : 1980  [x]  Patient  Verified  Payor: Sherren Bari / Plan: [employer] / Product Type: Managed Care Medicaid /    In time: 2:30P  Out time: 3:30P  Total Treatment Time (min): 60  Total Timed Codes (min): 50  1:1 Treatment Time ( only): -   Visit #: 4      Treatment Area: Neck pain [M54.2]    SUBJECTIVE  Pain Level (0-10 scale): 4-5/10 neck and mid back  Any medication changes, allergies to medications, adverse drug reactions, diagnosis change, or new procedure performed?: [x] No    [] Yes (see summary sheet for update)  Subjective functional status/changes:   [] No changes reported  Pt reports feeling better. Had a migraine yesterday but did not take anything. Still having trouble sleeping may wake up after 2-3 hours of sleep. Pt reports she thinks it is due pain and getting comfortable. Does not want to talk to her MD about is because she does not want to have to take another pill.      OBJECTIVE    Modality rationale: decrease inflammation, decrease pain and increase tissue extensibility to improve the patients ability to ADLs, driving, standing and lifting tolerance   Min Type Additional Details   -- [x] Estim: []Att   [x]Unatt        []TENS instruct                  [x]IFC  []Premod   []NMES                     []Other:  []w/US   []w/ice   [x]w/heat  Position: Reclined  Location: neck    []  Traction: [] Cervical       []Lumbar                       [] Prone          []Supine                       []Intermittent   []Continuous Lbs:  [] before manual  [] after manual  []w/heat    []  Ultrasound: []Continuous   [] Pulsed at:                           []1MHz   []3MHz Location:  W/cm2:    [] Paraffin         Location:   []w/heat   10 []  Ice     [x]  Heat - Pre  []  Ice massage Position: supine  Location: neck    []  Laser  []  Other: Position:  Location:      []  Vasopneumatic Device Pressure:       [] lo [] med [] hi   Temperature:      [x] Skin assessment post-treatment:  [x]intact []redness- no adverse reaction    []redness  adverse reaction:     40 min Therapeutic Exercise:  [x] See flow sheet: passive B UT and LS stretching   Rationale: increase ROM, increase strength and improve coordination to improve the patients ability to ADLs, standing and lifting tolerance    10 min Manual Therapy: SOR, STM/MFR B UT, LS, and cervical PSM    Rationale: decrease pain, increase ROM, increase tissue extensibility and decrease trigger points to improve the patients ability to increase cervical mobility          With   [x] TE   [] TA   [] neuro   [] other: Patient Education: [x] Review HEP    [] Progressed/Changed HEP based on:   [] positioning   [] body mechanics   [] transfers   [] heat/ice application    [] other:      Other Objective/Functional Measures: --    Pain Level (0-10 scale) post treatment: 0/10  ASSESSMENT/Changes in Function:   Pt declined e-stim today. Pt tolerated session well. May look at postural tapping next session. Patient will continue to benefit from skilled PT services to modify and progress therapeutic interventions, address functional mobility deficits, address ROM deficits, address strength deficits, analyze and address soft tissue restrictions, analyze and cue movement patterns, analyze and modify body mechanics/ergonomics and assess and modify postural abnormalities to attain remaining goals. [x]  See Plan of Care  []  See progress note/recertification  []  See Discharge Summary         Progress towards goals / Updated goals:  Pt demonstrated increase alertness and tolerance to therapeutic interventions. Pt requires frequent VC on maintain proper posture.      PLAN  [x]  Upgrade activities as tolerated     [x]  Continue plan of care  [x]  Update interventions per flow sheet       []  Discharge due to:_  []  Other:_      Dianne Salinas PTA 7/11/2018  12:45 PM

## 2018-07-13 ENCOUNTER — APPOINTMENT (OUTPATIENT)
Dept: PHYSICAL THERAPY | Age: 38
End: 2018-07-13
Payer: MEDICAID

## 2018-07-17 ENCOUNTER — HOSPITAL ENCOUNTER (OUTPATIENT)
Dept: PHYSICAL THERAPY | Age: 38
Discharge: HOME | End: 2018-07-17
Payer: MEDICAID

## 2018-07-17 PROCEDURE — 97110 THERAPEUTIC EXERCISES: CPT

## 2018-07-17 NOTE — PROGRESS NOTES
PT DAILY TREATMENT NOTE - [facility] 2-15    Patient Name: Jay Jaeger  Date:2018  : 1980  [x]  Patient  Verified  Payor: Tevin Cunningham / Plan: [employer]le / Product Type: Managed Care Medicaid /    In time: 11:30A Out time: 12:20P  Total Treatment Time (min): 50  Total Timed Codes (min): 40  1:1 Treatment Time ( only): -   Visit #: 5      Treatment Area: Neck pain [M54.2]    SUBJECTIVE  Pain Level (0-10 scale): 0/10 neck and mid back  Any medication changes, allergies to medications, adverse drug reactions, diagnosis change, or new procedure performed?: [x] No    [] Yes (see summary sheet for update)  Subjective functional status/changes:   [] No changes reported  Feeling okay today. Some stiffness on R side of neck. Pt had a migraine a couple of days ago.    OBJECTIVE    Modality rationale: decrease inflammation, decrease pain and increase tissue extensibility to improve the patients ability to ADLs, driving, standing and lifting tolerance   Min Type Additional Details   -- [x] Estim: []Att   [x]Unatt        []TENS instruct                  [x]IFC  []Premod   []NMES                     []Other:  []w/US   []w/ice   [x]w/heat  Position: Reclined  Location: neck    []  Traction: [] Cervical       []Lumbar                       [] Prone          []Supine                       []Intermittent   []Continuous Lbs:  [] before manual  [] after manual  []w/heat    []  Ultrasound: []Continuous   [] Pulsed at:                           []1MHz   []3MHz Location:  W/cm2:    [] Paraffin         Location:   []w/heat   10 []  Ice     [x]  Heat - Pre  []  Ice massage Position: supine  Location: neck    []  Laser  []  Other: Position:  Location:      []  Vasopneumatic Device Pressure:       [] lo [] med [] hi   Temperature:      [x] Skin assessment post-treatment:  [x]intact []redness- no adverse reaction    []redness  adverse reaction:     40 min Therapeutic Exercise:  [x] See flow sheet: passive B UT and LS stretching   Rationale: increase ROM, increase strength and improve coordination to improve the patients ability to ADLs, standing and lifting tolerance    -- min Manual Therapy: SOR, STM/MFR B UT, LS, and cervical PSM    Rationale: decrease pain, increase ROM, increase tissue extensibility and decrease trigger points to improve the patients ability to increase cervical mobility          With   [x] TE   [] TA   [] neuro   [] other: Patient Education: [x] Review HEP    [] Progressed/Changed HEP based on:   [] positioning   [] body mechanics   [] transfers   [] heat/ice application    [] other:      Other Objective/Functional Measures: --    Pain Level (0-10 scale) post treatment: 0/10  ASSESSMENT/Changes in Function:   Held on manual today secondary to pt's reports of feeling better. Added farmer's carry. Pt tolerated session well today. Patient will continue to benefit from skilled PT services to modify and progress therapeutic interventions, address functional mobility deficits, address ROM deficits, address strength deficits, analyze and address soft tissue restrictions, analyze and cue movement patterns, analyze and modify body mechanics/ergonomics and assess and modify postural abnormalities to attain remaining goals. [x]  See Plan of Care  []  See progress note/recertification  []  See Discharge Summary         Progress towards goals / Updated goals:  Pt demonstrated increase alertness and tolerance to therapeutic interventions. Pt requires frequent VC on maintain proper posture.      PLAN  [x]  Upgrade activities as tolerated     [x]  Continue plan of care  [x]  Update interventions per flow sheet       []  Discharge due to:_  []  Other:_      Elvis Carrasco, ALEX 7/17/2018  12:45 PM

## 2018-07-20 ENCOUNTER — HOSPITAL ENCOUNTER (OUTPATIENT)
Dept: PHYSICAL THERAPY | Age: 38
Discharge: HOME | End: 2018-07-20
Payer: MEDICAID

## 2018-07-20 PROCEDURE — 97014 ELECTRIC STIMULATION THERAPY: CPT

## 2018-07-20 PROCEDURE — 97110 THERAPEUTIC EXERCISES: CPT

## 2018-08-24 ENCOUNTER — OFFICE VISIT (OUTPATIENT)
Dept: NEUROLOGY | Age: 38
End: 2018-08-24

## 2018-08-24 VITALS
BODY MASS INDEX: 28.34 KG/M2 | DIASTOLIC BLOOD PRESSURE: 72 MMHG | WEIGHT: 154 LBS | HEIGHT: 62 IN | SYSTOLIC BLOOD PRESSURE: 108 MMHG

## 2018-08-24 DIAGNOSIS — R42 DIZZINESS: ICD-10-CM

## 2018-08-24 DIAGNOSIS — G43.909 MIGRAINE WITHOUT STATUS MIGRAINOSUS, NOT INTRACTABLE, UNSPECIFIED MIGRAINE TYPE: ICD-10-CM

## 2018-08-24 DIAGNOSIS — G43.909 MIGRAINE WITHOUT STATUS MIGRAINOSUS, NOT INTRACTABLE, UNSPECIFIED MIGRAINE TYPE: Primary | ICD-10-CM

## 2018-08-24 DIAGNOSIS — R42 DIZZINESS: Primary | ICD-10-CM

## 2018-08-24 RX ORDER — PREDNISONE 10 MG/1
TABLET ORAL
Qty: 42 TAB | Refills: 0 | Status: SHIPPED | OUTPATIENT
Start: 2018-08-24 | End: 2018-11-13

## 2018-08-24 NOTE — PATIENT INSTRUCTIONS
[facility]   Statement to Patients  April 1, 2014      In an effort to ensure the large volume of patient prescription refills is processed in the most efficient and expeditious manner, we are asking our patients to assist us by calling your Pharmacy for all prescription refills, this will include also your  Mail Order Pharmacy. The pharmacy will contact our office electronically to continue the refill process. Please do not wait until the last minute to call your pharmacy. We need at least 48 hours (2days) to fill prescriptions. We also encourage you to call your pharmacy before going to  your prescription to make sure it is ready. With regard to controlled substance prescription refill requests (narcotic refills) that need to be picked up at our office, we ask your cooperation by providing us with at least 72 hours (3days) notice that you will need a refill. We will not refill narcotic prescription refill requests after 4:00pm on any weekday, Monday through Thursday, or after 2:00pm on Fridays, or on the weekends. We encourage everyone to explore another way of getting your prescription refill request processed using [employer], our patient web portal through our electronic medical record system. [employer] is an efficient and effective way to communicate your medication request directly to the office and  downloadable as an noel on your smart phone . [employer] also features a review functionality that allows you to view your medication list as well as leave messages for your physician. Are you ready to get connected? If so please review the attatched instructions or speak to any of our staff to get you set up right away! Thank you so much for your cooperation. Should you have any questions please contact our Practice Administrator.     The Physicians and Staff,  [facility]

## 2018-08-24 NOTE — PROGRESS NOTES
Patient is here for follow up. Kept migraines month of June and July. Only 2 in august. Ringing in ears with migraine. Dizziness after getting off elevators.

## 2018-08-24 NOTE — MR AVS SNAPSHOT
[facility] 
 
 
 [place] [address] [id number] [phone] Patient: Zhen Lorenzana MRN: [id number] ILU:[id number] Visit Information Date & Time Provider Department Dept. Phone Encounter #  
 8/24/2018 10:30 AM Santy Braun NP [facility] Neurology [facility] [phone] [id number] Your Appointments 8/24/2018 11:30 AM  
ULTRASOUND with DOPPLER [address] ([address]) Appt Note: routine leathw [place] [address] [id number] [phone]  
  
   
 [place] [address] [id number] [address] Upcoming Health Maintenance Date Due Pneumococcal 19-64 Medium Risk (1 of 1 - PPSV23) 3/3/1998 PAP AKA CERVICAL CYTOLOGY 3/3/2000 Influenza Age 5 to Adult 8/1/2018 DTaP/Tdap/Td series (2 - Td) 11/26/2027 Allergies as of 8/24/2018  Review Complete On: 8/24/2018 By: Rohit Lama Severity Noted Reaction Type Reactions Latex  11/30/2017    Rash Penicillins High 11/26/2017   Systemic Anaphylaxis Shellfish Derived High 11/30/2017    Anaphylaxis Pcn [Penicillins]  03/28/2014    Unknown (comments) Tree Nut  11/30/2017    Swelling Zoloft [Sertraline]  02/16/2018    Diarrhea Current Immunizations  Reviewed on 12/1/2017 Name Date Tdap 11/26/2017 11:05 PM  
  
 Not reviewed this visit You Were Diagnosed With   
  
 Codes Comments Migraine without status migrainosus, not intractable, unspecified migraine type    -  Primary ICD-10-CM: P08.262 ICD-9-CM: 346.90 Dizziness     ICD-10-CM: G99 ICD-9-CM: 780.4 Vitals BP Height(growth percentile) Weight(growth percentile) BMI OB Status Smoking Status 108/72 5' 2\" (1.575 m) 154 lb (69.9 kg) 28.17 kg/m2 IUD Current Every Day Smoker BMI and BSA Data  Body Mass Index Body Surface Area  
 28.17 kg/m 2 1.75 m 2  
 Preferred Pharmacy Pharmacy Name Phone [address], [facility] [address] [phone] Your Updated Medication List  
  
   
This list is accurate as of 18 11:05 AM.  Always use your most recent med list.  
  
  
  
  
 cyclobenzaprine 10 mg tablet Commonly known as:  FLEXERIL Take  1 tablet by mouth nightly PRN  
  
 diclofenac EC 75 mg EC tablet Commonly known as:  VOLTAREN Take 1 Tab by mouth two (2) times a day. divalproex  mg ER tablet Commonly known as:  DEPAKOTE ER Take 3 tablets by mouth nightly. LaMICtal 25 mg tablet Generic drug:  lamoTRIgine Take  by mouth. 50 mg BID  
  
 ondansetron 4 mg disintegrating tablet Commonly known as:  ZOFRAN ODT Take 1 Tab by mouth every eight (8) hours as needed for Nausea. predniSONE 10 mg tablet Commonly known as:  DELTASONE  
6 po x2 days, 5 po x 2days, 4 po x 2days, 3 po x2days, 2 po x2days, 1 po x 2days * SUMAtriptan 100 mg tablet Commonly known as:  IMITREX  
1 at HA onset and repeat in 2 hours if needed. Max 2 in 24 hours * SUMAtriptan succinate 3 mg/0.5 mL Pnij Commonly known as:  Lexi Stalling  
1 Syringe by SubCUTAneous route as needed. May repeat every hour X 4 doses. Max 4 doses in 24 hours. topiramate 100 mg tablet Commonly known as:  TOPAMAX Take 1 Tab by mouth two (2) times a day. triamcinolone acetonide 0.5 % ointment Commonly known as:  KENALOG * Notice: This list has 2 medication(s) that are the same as other medications prescribed for you. Read the directions carefully, and ask your doctor or other care provider to review them with you. Prescriptions Sent to Pharmacy Refills  
 predniSONE (DELTASONE) 10 mg tablet 0 Si po x2 days, 5 po x 2days, 4 po x 2days, 3 po x2days, 2 po x2days, 1 po x 2days  Class: Normal  
 Pharmacy: [facility] [address], [address] [place] [address] CLAUDETTE  #: [phone] To-Do List   
 08/24/2018 Imaging:  DUPLEX CAROTID BILATERAL AMB NEURO Patient Instructions PRESCRIPTION REFILL POLICY [facility] Statement to Patients April 1, 2014 In an effort to ensure the large volume of patient prescription refills is processed in the most efficient and expeditious manner, we are asking our patients to assist us by calling your Pharmacy for all prescription refills, this will include also your  Mail Order Pharmacy. The pharmacy will contact our office electronically to continue the refill process. Please do not wait until the last minute to call your pharmacy. We need at least 48 hours (2days) to fill prescriptions. We also encourage you to call your pharmacy before going to  your prescription to make sure it is ready. With regard to controlled substance prescription refill requests (narcotic refills) that need to be picked up at our office, we ask your cooperation by providing us with at least 72 hours (3days) notice that you will need a refill. We will not refill narcotic prescription refill requests after 4:00pm on any weekday, Monday through Thursday, or after 2:00pm on Fridays, or on the weekends. We encourage everyone to explore another way of getting your prescription refill request processed using [employer], our patient web portal through our electronic medical record system. [employer] is an efficient and effective way to communicate your medication request directly to the office and  downloadable as an noel on your smart phone . [employer] also features a review functionality that allows you to view your medication list as well as leave messages for your physician. Are you ready to get connected? If so please review the attatched instructions or speak to any of our staff to get you set up right away! Thank you so much for your cooperation.  Should you have any questions please contact our Practice Administrator. The Physicians and Staff,  [facility] Introducing [facility] SERVICES! Dear Evon Mora: 
Thank you for requesting a Un[employer]il account. Our records indicate that you already have an active Unveil account. You can access your account anytime at [email or web address] Did you know that you can access your hospital and ER discharge instructions at any time in Unveil? You can also review all of your test results from your hospital stay or ER visit. Additional Information If you have questions, please visit the Frequently Asked Questions section of the Unveil website at [email or web address] Remember, Unveil is NOT to be used for urgent needs. For medical emergencies, dial 911. Now available from your iPhone and Android! Please provide this summary of care documentation to your next provider. Your primary care clinician is listed as Farshad Mcgarry. If you have any questions after today's visit, please call [phone].

## 2018-08-24 NOTE — PROGRESS NOTES
Hillary Phillips is a 43 y.o. female who presents with the following  Chief Complaint   Patient presents with    Follow-up       HPI Connor comes in for a follow up for  migraines with 2-3 a week lasting 8 hour or more. Migraines are located in entire head. Has associated dizziness, visual disturbance where she will see flashing lights, nausea, light sound smell sensitive. Also has visual concerns. Currently on Topamax 100 mg BID and Depakote 750 mg nightly. She used imitrex tablets for abortive therapy and these work well usually after 1 tablet in about 3 hours to dull to a functional level but not away completely. She uses Zembrace for the really bad one but she doesn't like shots. Getting to the gym more and has noticed neck pain is a lot better with post therapy. She does have ringing in the ears that comes and goes and dizziness for no apparent reason, no triggers. Can get dizzy when on elevator, at certain heights. Nothing else changed     Allergies   Allergen Reactions    Latex Rash    Penicillins Anaphylaxis    Shellfish Derived Anaphylaxis    Pcn [Penicillins] Unknown (comments)    Tree Nut Swelling    Zoloft [Sertraline] Diarrhea       Current Outpatient Prescriptions   Medication Sig    predniSONE (DELTASONE) 10 mg tablet 6 po x2 days, 5 po x 2days, 4 po x 2days, 3 po x2days, 2 po x2days, 1 po x 2days    divalproex ER (DEPAKOTE ER) 250 mg ER tablet Take 3 tablets by mouth nightly.  topiramate (TOPAMAX) 100 mg tablet Take 1 Tab by mouth two (2) times a day.  cyclobenzaprine (FLEXERIL) 10 mg tablet Take  1 tablet by mouth nightly PRN    lamoTRIgine (LAMICTAL) 25 mg tablet Take  by mouth. 50 mg BID    ondansetron (ZOFRAN ODT) 4 mg disintegrating tablet Take 1 Tab by mouth every eight (8) hours as needed for Nausea.  SUMAtriptan succinate (ZEMBRACE SYMTOUCH) 3 mg/0.5 mL pnij 1 Syringe by SubCUTAneous route as needed. May repeat every hour X 4 doses. Max 4 doses in 24 hours.     diclofenac EC (VOLTAREN) 75 mg EC tablet Take 1 Tab by mouth two (2) times a day.  SUMAtriptan (IMITREX) 100 mg tablet 1 at HA onset and repeat in 2 hours if needed. Max 2 in 24 hours    triamcinolone acetonide (KENALOG) 0.5 % ointment      No current facility-administered medications for this visit. History   Smoking Status    Current Every Day Smoker    Packs/day: 0.50   Smokeless Tobacco    Never Used       Past Medical History:   Diagnosis Date    Anxiety     Anxiety     Chest pain     Depression     Diabetes ([address]     Fatigue     Headache     Headache(784.0)     Ill-defined condition     \"cervicle stenosis\"    Memory loss     Neurological disorder     siezures    Neuropathy     PTSD (post-traumatic stress disorder)     Rash     Ringing in ears     Ringing in ears     Skipped beats     Snoring     Visual disturbance        Past Surgical History:   Procedure Laterality Date    HX GYN         Family History   Problem Relation Age of Onset    Headache Mother     Neuropathy Mother     Cancer Father     Seizures Other     Heart Disease Other     Stroke Other     Seizures Maternal Grandmother        Social History     Social History    Marital status: SINGLE     Spouse name: N/A    Number of children: N/A    Years of education: N/A     Social History Main Topics    Smoking status: Current Every Day Smoker     Packs/day: 0.50    Smokeless tobacco: Never Used    Alcohol use Yes      Comment: weekends    Drug use: No    Sexual activity: Not Asked     Other Topics Concern    None     Social History Narrative       Review of Systems   Eyes: Positive for blurred vision and photophobia. Respiratory: Negative for shortness of breath and wheezing. Cardiovascular: Negative for chest pain and palpitations. Gastrointestinal: Positive for nausea. Negative for vomiting. Musculoskeletal: Negative for neck pain. Neurological: Positive for dizziness and headaches.  Negative for tingling, tremors, seizures and loss of consciousness. Remainder of comprehensive review is negative. Physical Exam :    Visit Vitals    /72    Ht 5' 2\" (1.575 m)    Wt 69.9 kg (154 lb)    BMI 28.17 kg/m2       General: Well defined, nourished, and groomed individual in no acute distress.    Neck: Supple, nontender, no bruits, no pain with resistance to active range of motion.    Heart: Regular rate and rhythm, no murmurs, rub, or gallop. Normal S1S2. Lungs: Clear to auscultation bilaterally with equal chest expansion, no cough, no wheeze  Musculoskeletal: Extremities revealed no edema and had full range of motion of joints.    Psych: Good mood and bright affect    NEUROLOGICAL EXAMINATION:    Mental Status: Alert and oriented to person, place, and time    Cranial Nerves:    II, III, IV, VI: Visual acuity grossly intact. Visual fields are normal.    Pupils are equal, round, and reactive to light and accommodation.    Extra-ocular movements are full and fluid. Fundoscopic exam was benign, no ptosis or nystagmus.    V-XII: Hearing is grossly intact. Facial features are symmetric, with normal sensation and strength. The palate rises symmetrically and the tongue protrudes midline. Sternocleidomastoids 5/5. Motor Examination: Normal tone, bulk, and strength, 5/5 muscle strength throughout. Coordination: Finger to nose was normal. No resting or intention tremor    Gait and Station: Steady while walking. Normal arm swing. No pronator drift. No muscle wasting or fasiculations noted. Reflexes: DTRs 2+ throughout.             Results for orders placed or performed in visit on 12/15/17   CBC WITH AUTOMATED DIFF   Result Value Ref Range    WBC 9.5 3.4 - 10.8 x10E3/uL    RBC 4.12 3.77 - 5.28 x10E6/uL    HGB 13.0 11.1 - 15.9 g/dL    HCT 39.2 34.0 - 46.6 %    MCV 95 79 - 97 fL    MCH 31.6 26.6 - 33.0 pg    MCHC 33.2 31.5 - 35.7 g/dL    RDW 12.7 12.3 - 15.4 %    PLATELET 038 999 - 141 x10E3/uL NEUTROPHILS 51 Not Estab. %    Lymphocytes 40 Not Estab. %    MONOCYTES 6 Not Estab. %    EOSINOPHILS 3 Not Estab. %    BASOPHILS 0 Not Estab. %    ABS. NEUTROPHILS 4.8 1.4 - 7.0 x10E3/uL    Abs Lymphocytes 3.8 (H) 0.7 - 3.1 x10E3/uL    ABS. MONOCYTES 0.6 0.1 - 0.9 x10E3/uL    ABS. EOSINOPHILS 0.2 0.0 - 0.4 x10E3/uL    ABS. BASOPHILS 0.0 0.0 - 0.2 x10E3/uL    IMMATURE GRANULOCYTES 0 Not Estab. %    ABS. IMM. GRANS. 0.0 0.0 - 0.1 x10E3/uL   IRON   Result Value Ref Range    Iron 151 27 - 159 ug/dL   FERRITIN   Result Value Ref Range    Ferritin 203 (H) 15 - 150 ng/mL       Orders Placed This Encounter    DUPLEX CAROTID BILATERAL AMB NEURO ([id number])     Standing Status:   Future     Number of Occurrences:   1     Standing Expiration Date:   2019     Order Specific Question:   Reason for Exam:     Answer:   dizziness    predniSONE (DELTASONE) 10 mg tablet     Si po x2 days, 5 po x 2days, 4 po x 2days, 3 po x2days, 2 po x2days, 1 po x 2days     Dispense:  42 Tab     Refill:  0       1. Migraine without status migrainosus, not intractable, unspecified migraine type    2. Dizziness        Follow-up Disposition: Not on File    Migraines are worse recently but feels like things are calming back down. Will try Prednisone to break her current cycle as this worked well previously and she has a bout of poison oak on her arm this will help also. Keep Depakote, Topamax as is for prevention. Keep Flexeril PRN QHS To help with neck pain. She is going to the gym, doing PT exercises to continue to help and feels a lot better. We discussed her previous MRI and findings on this. We will re-evaluate if she feels like prednisone does not help the ringing, dizziness and she will call to let us know. We will send her for a doppler to look at causes of dizziness including stenosis. Call with any changes.            This note will not be viewable in In Flow[employer]

## 2018-08-26 NOTE — PROCEDURES
Carotid Doppler:     Date:  8/24/2018     Requesting Physician:  Hoang Sung     Indication:  Stenosis     B-mode imaging reveals mild plaque at the bifurcations bilaterally. Doppler spectral analysis reveals no significant velocity shifts. Vertebral artery flow antegrade bilaterally. Interpretation:    1. Minimal plaque. 2. No significant stenosis.

## 2018-10-10 RX ORDER — DIVALPROEX SODIUM 250 MG/1
TABLET, EXTENDED RELEASE ORAL
Qty: 60 TAB | Refills: 5 | Status: SHIPPED | OUTPATIENT
Start: 2018-10-10 | End: 2018-11-13

## 2018-10-12 NOTE — ANCILLARY DISCHARGE INSTRUCTIONS
[employer] Physical Therapy [facility] [address] Phone: [phone]  Fax: [phone] Discharge Summary  2-15 Patient name: Alex Brody  : 1980  Provider#: [id number] Referral source: Alan Avery NP Medical/Treatment Diagnosis: Neck pain [M54.2] Prior Hospitalization: see medical history Comorbidities: See Plan of Care Prior Level of Function:See Plan of Care Medications: Verified on Patient Summary List 
 
Start of Care: 7/3/18      Onset Date:2017 Visits from Start of Care: 5     Missed Visits: 1 Reporting Period : 7/3/18 to 18 ASSESSMENT/SUMMARY OF CARE: The patient has not been seen since 18. RECOMMENDATIONS: 
[x]Discontinue therapy: []Patient has reached or is progressing toward set goals [x]Patient is non-compliant or has abdicated 
    []Due to lack of appreciable progress towards set goals []Other Gayle Almanza PT 10/12/2018 8:10 AM

## 2018-11-13 ENCOUNTER — HOSPITAL ENCOUNTER (OUTPATIENT)
Dept: GENERAL RADIOLOGY | Age: 38
Discharge: HOME | End: 2018-11-13
Attending: NURSE PRACTITIONER
Payer: MEDICAID

## 2018-11-13 ENCOUNTER — OFFICE VISIT (OUTPATIENT)
Dept: NEUROLOGY | Age: 38
End: 2018-11-13

## 2018-11-13 VITALS
SYSTOLIC BLOOD PRESSURE: 122 MMHG | HEIGHT: 62 IN | WEIGHT: 158 LBS | DIASTOLIC BLOOD PRESSURE: 80 MMHG | BODY MASS INDEX: 29.08 KG/M2

## 2018-11-13 DIAGNOSIS — M25.531 RIGHT WRIST PAIN: ICD-10-CM

## 2018-11-13 DIAGNOSIS — G47.00 INSOMNIA, UNSPECIFIED TYPE: Primary | ICD-10-CM

## 2018-11-13 DIAGNOSIS — G43.909 MIGRAINE WITHOUT STATUS MIGRAINOSUS, NOT INTRACTABLE, UNSPECIFIED MIGRAINE TYPE: ICD-10-CM

## 2018-11-13 DIAGNOSIS — R89.9 ABNORMAL LABORATORY TEST: ICD-10-CM

## 2018-11-13 PROCEDURE — 73110 X-RAY EXAM OF WRIST: CPT

## 2018-11-13 RX ORDER — DICLOFENAC SODIUM 75 MG/1
75 TABLET, DELAYED RELEASE ORAL 2 TIMES DAILY
Qty: 60 TAB | Refills: 5 | Status: SHIPPED | OUTPATIENT
Start: 2018-11-13

## 2018-11-13 RX ORDER — ZOLPIDEM TARTRATE 10 MG/1
TABLET ORAL
Qty: 30 TAB | Refills: 0 | Status: SHIPPED | OUTPATIENT
Start: 2018-11-13

## 2018-11-13 NOTE — PROGRESS NOTES
Jefm Riedel is a 43 y.o. female who presents with the following  Chief Complaint   Patient presents with    Follow-up     migraines       HPI Connor comes in for a follow up for  migraines with 3 a month a week lasting 8 hours or longer. the only change is that she is waking up with migraines and these are out of sleep. She is not sleeping very well either. Tried benadryl, ZZquil. Ambien in the past worked really well. Migraines are located in entire head. Has associated dizziness, visual disturbance where she will see flashing lights, nausea, light sound smell sensitive. Also has visual concerns. Currently on Topamax 100 mg BID and Depakote 750 mg nightly. She used imitrex tablets for abortive therapy and these work well usually after 1 tablet in about 3 hours to dull to a functional level but not away completely. She uses Zembrace for the really bad one but she doesn't like shots. Tried Imitrex 6 mg in past and this hurt and did not work. Getting to the gym more and has noticed neck pain is a lot better with post therapy. She does have ringing in the ears that comes and goes and dizziness for no apparent reason, no triggers. Can get dizzy when on elevator, at certain heights. Nothing else changed     Does have a growth on the right wrist that is painful looking like a ganglion cyst.   Allergies   Allergen Reactions    Latex Rash    Penicillins Anaphylaxis    Shellfish Derived Anaphylaxis    Pcn [Penicillins] Unknown (comments)    Tree Nut Swelling    Zoloft [Sertraline] Diarrhea       Current Outpatient Medications   Medication Sig    SUMAtriptan succinate (ZEMBRACE SYMTOUCH) 3 mg/0.5 mL pnij 1 Syringe by SubCUTAneous route as needed. May repeat every hour X 4 doses. Max 4 doses in 24 hours.  zolpidem (AMBIEN) 10 mg tablet Take 1/2 to 1 tablet by mouth nightly PRN    diclofenac EC (VOLTAREN) 75 mg EC tablet Take 1 Tab by mouth two (2) times a day.     divalproex ER (DEPAKOTE ER) 250 mg ER tablet Take 3 tablets by mouth nightly.  topiramate (TOPAMAX) 100 mg tablet Take 1 Tab by mouth two (2) times a day.  cyclobenzaprine (FLEXERIL) 10 mg tablet Take  1 tablet by mouth nightly PRN    lamoTRIgine (LAMICTAL) 25 mg tablet Take  by mouth. 50 mg BID    ondansetron (ZOFRAN ODT) 4 mg disintegrating tablet Take 1 Tab by mouth every eight (8) hours as needed for Nausea.  SUMAtriptan (IMITREX) 100 mg tablet 1 at HA onset and repeat in 2 hours if needed. Max 2 in 24 hours    triamcinolone acetonide (KENALOG) 0.5 % ointment      No current facility-administered medications for this visit.         Social History     Tobacco Use   Smoking Status Current Every Day Smoker    Packs/day: 0.50   Smokeless Tobacco Never Used       Past Medical History:   Diagnosis Date    Anxiety     Anxiety     Chest pain     Depression     Diabetes ([facility] [address]     Fatigue     Headache     Headache(784.0)     Ill-defined condition     \"cervicle stenosis\"    Memory loss     Neurological disorder     siezures    Neuropathy     PTSD (post-traumatic stress disorder)     Rash     Ringing in ears     Ringing in ears     Skipped beats     Snoring     Visual disturbance        Past Surgical History:   Procedure Laterality Date    HX GYN         Family History   Problem Relation Age of Onset    Headache Mother     Neuropathy Mother     Cancer Father     Seizures Other     Heart Disease Other     Stroke Other     Seizures Maternal Grandmother        Social History     Socioeconomic History    Marital status: SINGLE     Spouse name: Not on file    Number of children: Not on file    Years of education: Not on file    Highest education level: Not on file   Social Needs    Financial resource strain: Not on file    Food insecurity - worry: Not on file    Food insecurity - inability: Not on file   [employer] needs - medical: Not on file   [employer] needs - non-medical: Not on file   Occupational History    Not on file   Tobacco Use    Smoking status: Current Every Day Smoker     Packs/day: 0.50    Smokeless tobacco: Never Used   Substance and Sexual Activity    Alcohol use: Yes     Comment: weekends    Drug use: No    Sexual activity: Not on file   Other Topics Concern    Not on file   Social History Narrative    Not on file       ROS    Remainder of comprehensive review is negative. Physical Exam :    Visit Vitals  /80   Ht 5' 2\" (1.575 m)   Wt 71.7 kg (158 lb)   BMI 28.90 kg/m²       General: Well defined, nourished, and groomed individual in no acute distress.    Neck: Supple, nontender, no bruits, no pain with resistance to active range of motion.    Heart: Regular rate and rhythm, no murmurs, rub, or gallop. Normal S1S2. Lungs: Clear to auscultation bilaterally with equal chest expansion, no cough, no wheeze  Musculoskeletal: Extremities revealed no edema and had full range of motion of joints.  quarter size lump on dorsal part of right wrist, mobile. Psych: Good mood and bright affect    NEUROLOGICAL EXAMINATION:    Mental Status: Alert and oriented to person, place, and time    Cranial Nerves:    II, III, IV, VI: Visual acuity grossly intact. Visual fields are normal.    Pupils are equal, round, and reactive to light and accommodation.    Extra-ocular movements are full and fluid. Fundoscopic exam was benign, no ptosis or nystagmus.    V-XII: Hearing is grossly intact. Facial features are symmetric, with normal sensation and strength. The palate rises symmetrically and the tongue protrudes midline. Sternocleidomastoids 5/5. Motor Examination: Normal tone, bulk, and strength, 5/5 muscle strength throughout. Coordination: Finger to nose was normal. No resting or intention tremor    Gait and Station: Steady while walking. Normal arm swing. No pronator drift. No muscle wasting or fasiculations noted. Reflexes: DTRs 2+ throughout.           Results for orders placed or performed in visit on 12/15/17   CBC WITH AUTOMATED DIFF   Result Value Ref Range    WBC 9.5 3.4 - 10.8 x10E3/uL    RBC 4.12 3.77 - 5.28 x10E6/uL    HGB 13.0 11.1 - 15.9 g/dL    HCT 39.2 34.0 - 46.6 %    MCV 95 79 - 97 fL    MCH 31.6 26.6 - 33.0 pg    MCHC 33.2 31.5 - 35.7 g/dL    RDW 12.7 12.3 - 15.4 %    PLATELET 833 160 - 876 x10E3/uL    NEUTROPHILS 51 Not Estab. %    Lymphocytes 40 Not Estab. %    MONOCYTES 6 Not Estab. %    EOSINOPHILS 3 Not Estab. %    BASOPHILS 0 Not Estab. %    ABS. NEUTROPHILS 4.8 1.4 - 7.0 x10E3/uL    Abs Lymphocytes 3.8 (H) 0.7 - 3.1 x10E3/uL    ABS. MONOCYTES 0.6 0.1 - 0.9 x10E3/uL    ABS. EOSINOPHILS 0.2 0.0 - 0.4 x10E3/uL    ABS. BASOPHILS 0.0 0.0 - 0.2 x10E3/uL    IMMATURE GRANULOCYTES 0 Not Estab. %    ABS. IMM. GRANS. 0.0 0.0 - 0.1 x10E3/uL   IRON   Result Value Ref Range    Iron 151 27 - 159 ug/dL   FERRITIN   Result Value Ref Range    Ferritin 203 (H) 15 - 150 ng/mL       Orders Placed This Encounter    XR WRIST RT AP/LAT/OBL MIN 3V     Standing Status:   Future     Number of Occurrences:   1     Standing Expiration Date:   2019     Order Specific Question:   Reason for Exam     Answer:   wrist pain, growth     Order Specific Question:   Is Patient Pregnant? Answer:   No    SUMAtriptan succinate (ZEMBRACE SYMTOUCH) 3 mg/0.5 mL pnij     Si Syringe by SubCUTAneous route as needed. May repeat every hour X 4 doses. Max 4 doses in 24 hours. Dispense:  8 Syringe     Refill:  5    zolpidem (AMBIEN) 10 mg tablet     Sig: Take 1/2 to 1 tablet by mouth nightly PRN     Dispense:  30 Tab     Refill:  0    diclofenac EC (VOLTAREN) 75 mg EC tablet     Sig: Take 1 Tab by mouth two (2) times a day. Dispense:  60 Tab     Refill:  5       1. Insomnia, unspecified type    2. Migraine without status migrainosus, not intractable, unspecified migraine type    3. Abnormal laboratory test    4.  Right wrist pain        Follow-up Disposition:  Return in about 3 months (around 2/13/2019). Migraines are a little more frequent with changing to waking her up at night out of a deep sleep. She does notice she wakes up with extreme pain. She uses Zembrace in the past so we will continue to use this for these types of headaches as the tablets do not work with these and the imitrx 6 mg injection she tried in the past did not work either. Carri Awad has worked and she has been on this since November 2017. We will keep the Topamax, Depakote for prevention. Keep Diclofenac PRN for neck pain and swelling. We will try 5-10 mg Ambien to help with sleep and disturbance as she noticed the headaches come on when she does not sleep well and will have trouble getting and staying asleep. We will get a x ray of the right wrist and call with results to evaluate.          This note will not be viewable in [facility]

## 2018-11-13 NOTE — PATIENT INSTRUCTIONS
[facility]   Statement to Patients  April 1, 2014      In an effort to ensure the large volume of patient prescription refills is processed in the most efficient and expeditious manner, we are asking our patients to assist us by calling your Pharmacy for all prescription refills, this will include also your  Mail Order Pharmacy. The pharmacy will contact our office electronically to continue the refill process. Please do not wait until the last minute to call your pharmacy. We need at least 48 hours (2days) to fill prescriptions. We also encourage you to call your pharmacy before going to  your prescription to make sure it is ready. With regard to controlled substance prescription refill requests (narcotic refills) that need to be picked up at our office, we ask your cooperation by providing us with at least 72 hours (3days) notice that you will need a refill. We will not refill narcotic prescription refill requests after 4:00pm on any weekday, Monday through Thursday, or after 2:00pm on Fridays, or on the weekends. We encourage everyone to explore another way of getting your prescription refill request processed using [employer], our patient web portal through our electronic medical record system. [employer] is an efficient and effective way to communicate your medication request directly to the office and  downloadable as an noel on your smart phone . [employer] also features a review functionality that allows you to view your medication list as well as leave messages for your physician. Are you ready to get connected? If so please review the attatched instructions or speak to any of our staff to get you set up right away! Thank you so much for your cooperation. Should you have any questions please contact our Practice Administrator.     The Physicians and Staff,  [facility]

## 2018-11-16 ENCOUNTER — TELEPHONE (OUTPATIENT)
Dept: NEUROLOGY | Age: 38
End: 2018-11-16

## 2018-11-16 NOTE — TELEPHONE ENCOUNTER
----- Message from Qasim Rosario NP sent at 11/13/2018  3:23 PM EST -----  Can linda daley there know x r ay looked good. Ganglion cyst and can refer to orthopedic hand if needing treatment for injection or aspiration     ----- Message -----  From: Isrrael Ramirez Results In  Sent: 11/13/2018  12:37 PM  To:  Qasim Rosario NP

## 2018-11-16 NOTE — TELEPHONE ENCOUNTER
Called and spoke to patient. She states understanding and does not want the referral to orthopedic at this time. It is not bothering her right now. She wanted to know if she should wear a brace. Informed patient that if it is helping her to wear the brace but limit the time that she does wear it. She states understanding.

## 2018-12-20 ENCOUNTER — TELEPHONE (OUTPATIENT)
Dept: NEUROLOGY | Age: 38
End: 2018-12-20

## 2018-12-20 NOTE — TELEPHONE ENCOUNTER
Received PA request from [facility]. Called [facility] pharmacy and spoke with Siena Mejia. She advised PA request was denied on 11/15/18. Located letter of denial in . Katy Betancourt denied as \" the plan covers the prescribed medication for members who have had an unsuccessful trial of two of the following: RELPAX, sumatriptan succinate or razatriptan. \"    Please advise if appeal is needed.

## 2019-01-22 ENCOUNTER — TELEPHONE (OUTPATIENT)
Dept: NEUROLOGY | Age: 39
End: 2019-01-22

## 2019-01-22 RX ORDER — RIZATRIPTAN BENZOATE 10 MG/1
10 TABLET ORAL
Qty: 9 TAB | Refills: 5 | Status: SHIPPED | OUTPATIENT
Start: 2019-01-22 | End: 2019-01-22

## 2019-01-22 NOTE — TELEPHONE ENCOUNTER
[employer] message sent to patient informing her that NP sent rizatriptanTake 1 Tab by mouth once as needed for Migraine for up to 1 dose.  May repeat in 2 hours if needed

## 2019-01-22 NOTE — TELEPHONE ENCOUNTER
Received fax from [address] advising that PA is needed for [facility]. See PA denied last time and Appeal was needed. Please advise if a PA is still needed at this time. Per last PA request    \"Received PA request from [facility]. Called [facility] pharmacy and spoke with Dona Gonzalez. She advised PA request was denied on 11/15/18. Located letter of denial in . [facility] denied as \" the plan covers the prescribed medication for members who have had an unsuccessful trial of two of the following: RELPAX, sumatriptan succinate or razatriptan. \"

## 2020-06-16 ENCOUNTER — HOSPITAL ENCOUNTER (OUTPATIENT)
Dept: LAB | Age: 40
Discharge: HOME | End: 2020-06-16

## 2021-11-03 ENCOUNTER — HOSPITAL ENCOUNTER (EMERGENCY)
Age: 41
Discharge: LEFT BEFORE DISCHARGE | End: 2021-11-03
Attending: EMERGENCY MEDICINE
Payer: COMMERCIAL

## 2021-11-03 VITALS
BODY MASS INDEX: 29.08 KG/M2 | RESPIRATION RATE: 16 BRPM | OXYGEN SATURATION: 98 % | HEIGHT: 62 IN | SYSTOLIC BLOOD PRESSURE: 135 MMHG | WEIGHT: 158 LBS | HEART RATE: 83 BPM | DIASTOLIC BLOOD PRESSURE: 82 MMHG | TEMPERATURE: 97.5 F

## 2021-11-03 PROCEDURE — 75810000275 HC EMERGENCY DEPT VISIT NO LEVEL OF CARE

## 2021-11-03 PROCEDURE — 93005 ELECTROCARDIOGRAM TRACING: CPT

## 2021-11-03 NOTE — ED TRIAGE NOTES
Pt reports chest pain radiating down her left arm onset last night after her father passed away. States her pain feels like pressure and tightness and has not gone away. Seen at [facility] and referred to the ED for further evaluation. Denies hx of cardiac issues.

## 2021-11-04 NOTE — ED NOTES
ED Course as of 11/03/21 2046 Wed Nov 03, 2021 2045 Went to pull patient from waiting room to see her in closet area. No answer. Registration states that she left.  [TT]      ED Course User Index  [TT] Billy Lockhart MD

## 2021-11-05 ENCOUNTER — HOSPITAL ENCOUNTER (EMERGENCY)
Age: 41
Discharge: HOME | End: 2021-11-05
Attending: EMERGENCY MEDICINE
Payer: COMMERCIAL

## 2021-11-05 VITALS
BODY MASS INDEX: 34.89 KG/M2 | WEIGHT: 189.6 LBS | HEART RATE: 82 BPM | SYSTOLIC BLOOD PRESSURE: 106 MMHG | HEIGHT: 62 IN | RESPIRATION RATE: 16 BRPM | TEMPERATURE: 97.7 F | OXYGEN SATURATION: 97 % | DIASTOLIC BLOOD PRESSURE: 77 MMHG

## 2021-11-05 DIAGNOSIS — R07.9 CHEST PAIN, UNSPECIFIED TYPE: Primary | ICD-10-CM

## 2021-11-05 DIAGNOSIS — F41.9 ANXIETY: ICD-10-CM

## 2021-11-05 LAB
ALBUMIN SERPL-MCNC: 3.8 G/DL (ref 3.5–5)
ALBUMIN/GLOB SERPL: 1 {RATIO} (ref 1.1–2.2)
ALP SERPL-CCNC: 86 U/L (ref 45–117)
ALT SERPL-CCNC: 24 U/L (ref 12–78)
ANION GAP SERPL CALC-SCNC: 3 MMOL/L (ref 5–15)
APPEARANCE UR: ABNORMAL
AST SERPL-CCNC: 23 U/L (ref 15–37)
ATRIAL RATE: 84 BPM
ATRIAL RATE: 90 BPM
BACTERIA URNS QL MICRO: ABNORMAL /HPF
BASOPHILS # BLD: 0 K/UL (ref 0–0.1)
BASOPHILS NFR BLD: 0 % (ref 0–1)
BILIRUB SERPL-MCNC: 0.3 MG/DL (ref 0.2–1)
BILIRUB UR QL: NEGATIVE
BUN SERPL-MCNC: 8 MG/DL (ref 6–20)
BUN/CREAT SERPL: 11 (ref 12–20)
CALCIUM SERPL-MCNC: 9.4 MG/DL (ref 8.5–10.1)
CALCULATED P AXIS, ECG09: 27 DEGREES
CALCULATED P AXIS, ECG09: 38 DEGREES
CALCULATED R AXIS, ECG10: 47 DEGREES
CALCULATED R AXIS, ECG10: 53 DEGREES
CALCULATED T AXIS, ECG11: 16 DEGREES
CALCULATED T AXIS, ECG11: 50 DEGREES
CHLORIDE SERPL-SCNC: 106 MMOL/L (ref 97–108)
CO2 SERPL-SCNC: 29 MMOL/L (ref 21–32)
COLOR UR: ABNORMAL
COMMENT, HOLDF: NORMAL
CREAT SERPL-MCNC: 0.74 MG/DL (ref 0.55–1.02)
DIAGNOSIS, 93000: NORMAL
DIAGNOSIS, 93000: NORMAL
DIFFERENTIAL METHOD BLD: NORMAL
EOSINOPHIL # BLD: 0 K/UL (ref 0–0.4)
EOSINOPHIL NFR BLD: 0 % (ref 0–7)
EPITH CASTS URNS QL MICRO: ABNORMAL /LPF
ERYTHROCYTE [DISTWIDTH] IN BLOOD BY AUTOMATED COUNT: 12.1 % (ref 11.5–14.5)
GLOBULIN SER CALC-MCNC: 3.9 G/DL (ref 2–4)
GLUCOSE SERPL-MCNC: 78 MG/DL (ref 65–100)
GLUCOSE UR STRIP.AUTO-MCNC: NEGATIVE MG/DL
HCG SERPL QL: NEGATIVE
HCT VFR BLD AUTO: 43.6 % (ref 35–47)
HGB BLD-MCNC: 14.6 G/DL (ref 11.5–16)
HGB UR QL STRIP: NEGATIVE
IMM GRANULOCYTES # BLD AUTO: 0 K/UL
IMM GRANULOCYTES NFR BLD AUTO: 0 %
KETONES UR QL STRIP.AUTO: NEGATIVE MG/DL
LEUKOCYTE ESTERASE UR QL STRIP.AUTO: ABNORMAL
LYMPHOCYTES # BLD: 2.9 K/UL (ref 0.8–3.5)
LYMPHOCYTES NFR BLD: 28 % (ref 12–49)
MCH RBC QN AUTO: 31.7 PG (ref 26–34)
MCHC RBC AUTO-ENTMCNC: 33.5 G/DL (ref 30–36.5)
MCV RBC AUTO: 94.6 FL (ref 80–99)
MONOCYTES # BLD: 0.6 K/UL (ref 0–1)
MONOCYTES NFR BLD: 6 % (ref 5–13)
NEUTS SEG # BLD: 6.7 K/UL (ref 1.8–8)
NEUTS SEG NFR BLD: 66 % (ref 32–75)
NITRITE UR QL STRIP.AUTO: NEGATIVE
NRBC # BLD: 0 K/UL (ref 0–0.01)
NRBC BLD-RTO: 0 PER 100 WBC
P-R INTERVAL, ECG05: 132 MS
P-R INTERVAL, ECG05: 146 MS
PH UR STRIP: 6 [PH] (ref 5–8)
PLATELET # BLD AUTO: 320 K/UL (ref 150–400)
PMV BLD AUTO: 10 FL (ref 8.9–12.9)
POTASSIUM SERPL-SCNC: 3.8 MMOL/L (ref 3.5–5.1)
PROT SERPL-MCNC: 7.7 G/DL (ref 6.4–8.2)
PROT UR STRIP-MCNC: NEGATIVE MG/DL
Q-T INTERVAL, ECG07: 382 MS
Q-T INTERVAL, ECG07: 386 MS
QRS DURATION, ECG06: 68 MS
QRS DURATION, ECG06: 86 MS
QTC CALCULATION (BEZET), ECG08: 451 MS
QTC CALCULATION (BEZET), ECG08: 472 MS
RBC # BLD AUTO: 4.61 M/UL (ref 3.8–5.2)
RBC #/AREA URNS HPF: ABNORMAL /HPF (ref 0–5)
RBC MORPH BLD: NORMAL
SAMPLES BEING HELD,HOLD: NORMAL
SODIUM SERPL-SCNC: 138 MMOL/L (ref 136–145)
SP GR UR REFRACTOMETRY: 1.01 (ref 1–1.03)
TROPONIN-HIGH SENSITIVITY: 4 NG/L (ref 0–51)
TSH SERPL DL<=0.05 MIU/L-ACNC: 0.79 UIU/ML (ref 0.36–3.74)
UR CULT HOLD, URHOLD: NORMAL
UROBILINOGEN UR QL STRIP.AUTO: 0.2 EU/DL (ref 0.2–1)
VENTRICULAR RATE, ECG03: 84 BPM
VENTRICULAR RATE, ECG03: 90 BPM
WBC # BLD AUTO: 10.2 K/UL (ref 3.6–11)
WBC MORPH BLD: NORMAL
WBC URNS QL MICRO: ABNORMAL /HPF (ref 0–4)

## 2021-11-05 PROCEDURE — 84703 CHORIONIC GONADOTROPIN ASSAY: CPT

## 2021-11-05 PROCEDURE — 84484 ASSAY OF TROPONIN QUANT: CPT

## 2021-11-05 PROCEDURE — 80053 COMPREHEN METABOLIC PANEL: CPT

## 2021-11-05 PROCEDURE — 99283 EMERGENCY DEPT VISIT LOW MDM: CPT

## 2021-11-05 PROCEDURE — 36415 COLL VENOUS BLD VENIPUNCTURE: CPT

## 2021-11-05 PROCEDURE — 93005 ELECTROCARDIOGRAM TRACING: CPT

## 2021-11-05 PROCEDURE — 81001 URINALYSIS AUTO W/SCOPE: CPT

## 2021-11-05 PROCEDURE — 84443 ASSAY THYROID STIM HORMONE: CPT

## 2021-11-05 PROCEDURE — 85025 COMPLETE CBC W/AUTO DIFF WBC: CPT

## 2021-11-05 RX ORDER — LORAZEPAM 0.5 MG/1
0.5 TABLET ORAL
Qty: 20 TABLET | Refills: 0 | Status: SHIPPED | OUTPATIENT
Start: 2021-11-05

## 2021-11-05 NOTE — ED PROVIDER NOTES
58-year-old female with history of anxiety, diabetes and smoking presents with chief complaint of chest pain. Patient reports that she developed chest pain on November 2 after her father passed away the same day. She has had persistent central chest pain which she describes as a pressure associated with some shortness of breath since that time. She denies nausea, vomiting, abdominal pain, GI or urinary symptoms. Initially her pain radiated to the left arm but that has subsided. She does not rate the pain on a scale and denies aggravating or alleviating factors. She denies prior cardiac work-up. She has no family history of early cardiovascular disease.            Past Medical History:   Diagnosis Date    Anxiety     Anxiety     Chest pain     Depression     Diabetes ([address]     Fatigue     Headache     Headache(784.0)     Ill-defined condition     \"cervicle stenosis\"    Memory loss     Neurological disorder     siezures    Neuropathy     PTSD (post-traumatic stress disorder)     Rash     Ringing in ears     Ringing in ears     Skipped beats     Snoring     Visual disturbance        Past Surgical History:   Procedure Laterality Date    HX GYN           Family History:   Problem Relation Age of Onset    Headache Mother     Neuropathy Mother     Cancer Father     Seizures Other     Heart Disease Other     Stroke Other     Seizures Maternal Grandmother        Social History     Socioeconomic History    Marital status: SINGLE     Spouse name: Not on file    Number of children: Not on file    Years of education: Not on file    Highest education level: Not on file   Occupational History    Not on file   Tobacco Use    Smoking status: Current Every Day Smoker     Packs/day: 0.50    Smokeless tobacco: Never Used   Substance and Sexual Activity    Alcohol use: Yes     Comment: weekends    Drug use: No    Sexual activity: Not on file   Other Topics Concern    Not on file   Social History Narrative    Not on file     Social Determinants of Health     Financial Resource Strain:     Difficulty of Paying Living Expenses: Not on file   Food Insecurity:     Worried About Running Out of Food in the Last Year: Not on file    Forrest of Food in the Last Year: Not on file   Transportation Needs:     Lack of Transportation (Medical): Not on file    Lack of Transportation (Non-Medical): Not on file   Physical Activity:     Days of Exercise per Week: Not on file    Minutes of Exercise per Session: Not on file   Stress:     Feeling of Stress : Not on file   Social Connections:     Frequency of Communication with Friends and Family: Not on file    Frequency of Social Gatherings with Friends and Family: Not on file    Attends Jewish Services: Not on file    Active Member of [address] [place] [address] [employer] or Organizations: Not on file    Attends Club or Organization Meetings: Not on file    Marital Status: Not on file   Intimate Partner Violence:     Fear of Current or Ex-Partner: Not on file    Emotionally Abused: Not on file    Physically Abused: Not on file    Sexually Abused: Not on file   Housing Stability:     Unable to Pay for Housing in the Last Year: Not on file    Number of [place] in the Last Year: Not on file    Unstable Housing in the Last Year: Not on file         ALLERGIES: Latex, Penicillins, Shellfish derived, Pcn [penicillins], Tree nut, and Zoloft [sertraline]    Review of Systems   Constitutional: Negative for fever. HENT: Negative for rhinorrhea. Respiratory: Positive for shortness of breath. Cardiovascular: Positive for chest pain. Gastrointestinal: Negative for abdominal pain. Genitourinary: Negative for dysuria. Musculoskeletal: Negative for back pain. Skin: Negative for wound. Neurological: Negative for headaches. Psychiatric/Behavioral: Negative for confusion.        Vitals:    11/05/21 1319   BP: 106/77   Pulse: 82   Resp: 16   Temp: 97.7 °F (36.5 °C) SpO2: 97%   Weight: 86 kg (189 lb 9.5 oz)   Height: 5' 2\" (1.575 m)            Physical Exam  Vitals and nursing note reviewed. Constitutional:       General: She is not in acute distress. Appearance: Normal appearance. She is well-developed. She is not ill-appearing, toxic-appearing or diaphoretic. HENT:      Head: Normocephalic and atraumatic. Eyes:      Extraocular Movements: Extraocular movements intact. Cardiovascular:      Rate and Rhythm: Normal rate. Pulses: Normal pulses. Heart sounds: Normal heart sounds. Pulmonary:      Effort: Pulmonary effort is normal. No respiratory distress. Breath sounds: Normal breath sounds. Abdominal:      General: There is no distension. Palpations: Abdomen is soft. Tenderness: There is no abdominal tenderness. Musculoskeletal:         General: Normal range of motion. Cervical back: Normal range of motion. Skin:     General: Skin is warm and dry. Neurological:      General: No focal deficit present. Mental Status: She is alert and oriented to person, place, and time. Psychiatric:         Mood and Affect: Mood normal.          MDM  Number of Diagnoses or Management Options  Anxiety  Chest pain, unspecified type  Diagnosis management comments: Patient presents with chest pain. Differentials include but are not limited to anxiety, ACS, PE among many others. Her EKG is nonischemic. Her labs are unremarkable. Patient symptoms seem to be related to anxiety from her father's recent death. Will prescribe her a short course of Ativan and recommend follow-up with both cardiology and family medicine. Discussed my clinical impression(s), any labs and/or radiology results with the patient. I answered any questions and addressed any concerns. Discussed the importance of following up with their primary care physician and/or specialist(s).  Discussed signs or symptoms that would warrant return back to the ER for further evaluation. The patient is agreeable with discharge. EKG shows sinus rhythm at a rate of 81, normal intervals, normal axis, no ischemic changes.        Amount and/or Complexity of Data Reviewed  Clinical lab tests: ordered and reviewed  Tests in the radiology section of CPT®: ordered and reviewed           Procedures

## 2021-11-05 NOTE — ED TRIAGE NOTES
Patient report chest tightness that started on Sunday  And was radiating to the left arm. Today the pain in the arm is resolved, but chest tightness is still there. Aslo reports headache. Patient is ambulatory in Triage.

## 2022-03-18 PROBLEM — G43.909 MIGRAINE HEADACHE: Status: ACTIVE | Noted: 2017-11-30

## 2022-03-19 PROBLEM — G44.40 ANALGESIC OVERUSE HEADACHE: Status: ACTIVE | Noted: 2017-09-22

## 2022-03-19 PROBLEM — R40.4 TRANSIENT ALTERATION OF AWARENESS: Status: ACTIVE | Noted: 2017-09-22

## 2022-03-19 PROBLEM — R40.4 SPELL OF ALTERED CONSCIOUSNESS: Status: ACTIVE | Noted: 2017-09-22

## 2022-03-19 PROBLEM — T39.95XA ANALGESIC OVERUSE HEADACHE: Status: ACTIVE | Noted: 2017-09-22

## 2022-03-19 PROBLEM — R41.89 UNRESPONSIVE: Status: ACTIVE | Noted: 2017-09-22

## 2022-07-04 ENCOUNTER — HOSPITAL ENCOUNTER (EMERGENCY)
Age: 42
Discharge: HOME | End: 2022-07-04
Attending: EMERGENCY MEDICINE | Admitting: EMERGENCY MEDICINE
Payer: COMMERCIAL

## 2022-07-04 ENCOUNTER — APPOINTMENT (OUTPATIENT)
Dept: GENERAL RADIOLOGY | Age: 42
End: 2022-07-04
Attending: STUDENT IN AN ORGANIZED HEALTH CARE EDUCATION/TRAINING PROGRAM
Payer: COMMERCIAL

## 2022-07-04 VITALS
RESPIRATION RATE: 18 BRPM | HEART RATE: 87 BPM | SYSTOLIC BLOOD PRESSURE: 110 MMHG | OXYGEN SATURATION: 98 % | WEIGHT: 192 LBS | BODY MASS INDEX: 35.33 KG/M2 | TEMPERATURE: 98.6 F | DIASTOLIC BLOOD PRESSURE: 65 MMHG | HEIGHT: 62 IN

## 2022-07-04 DIAGNOSIS — S61.421A LACERATION OF RIGHT HAND WITH FOREIGN BODY, INITIAL ENCOUNTER: Primary | ICD-10-CM

## 2022-07-04 PROCEDURE — 90471 IMMUNIZATION ADMIN: CPT

## 2022-07-04 PROCEDURE — 99284 EMERGENCY DEPT VISIT MOD MDM: CPT

## 2022-07-04 PROCEDURE — 73130 X-RAY EXAM OF HAND: CPT

## 2022-07-04 PROCEDURE — 75810000121 HC INCSN/RMVL FB ANY OTHER SITE

## 2022-07-04 PROCEDURE — 74011000250 HC RX REV CODE- 250: Performed by: STUDENT IN AN ORGANIZED HEALTH CARE EDUCATION/TRAINING PROGRAM

## 2022-07-04 PROCEDURE — 77030010507 HC ADH SKN DERMBND J&J -B

## 2022-07-04 PROCEDURE — 90715 TDAP VACCINE 7 YRS/> IM: CPT | Performed by: STUDENT IN AN ORGANIZED HEALTH CARE EDUCATION/TRAINING PROGRAM

## 2022-07-04 PROCEDURE — 74011250636 HC RX REV CODE- 250/636: Performed by: STUDENT IN AN ORGANIZED HEALTH CARE EDUCATION/TRAINING PROGRAM

## 2022-07-04 RX ORDER — LIDOCAINE HYDROCHLORIDE 10 MG/ML
5 INJECTION, SOLUTION EPIDURAL; INFILTRATION; INTRACAUDAL; PERINEURAL ONCE
Status: COMPLETED | OUTPATIENT
Start: 2022-07-04 | End: 2022-07-04

## 2022-07-04 RX ADMIN — TETANUS TOXOID, REDUCED DIPHTHERIA TOXOID AND ACELLULAR PERTUSSIS VACCINE, ADSORBED 0.5 ML: 5; 2.5; 8; 8; 2.5 SUSPENSION INTRAMUSCULAR at 21:33

## 2022-07-04 RX ADMIN — LIDOCAINE HYDROCHLORIDE 5 ML: 10 INJECTION, SOLUTION EPIDURAL; INFILTRATION; INTRACAUDAL; PERINEURAL at 20:18

## 2022-07-05 NOTE — DISCHARGE INSTRUCTIONS
Continue to monitor for signs of infections such as redness, swelling, drainage, or increasing pain. Return if concern for infection. Can use antibiotic ointment twice daily on wound.

## 2022-07-05 NOTE — ED TRIAGE NOTES
Patient arrives with glass stuck in R palm after scrubbing shower. Bleeding controlled. Full ROM present with no numbness/tingling.

## 2022-07-05 NOTE — ED PROVIDER NOTES
50-year-old female with history of anxiety, depression, diabetes, PTSD and neuropathy presents to ED with foreign body and right hand. Patient reports that prior to arrival she was cleaning her shower when she suddenly felt a sharp pain in her right hand and noticed there was a small shard of glass in it. She immediately told her  and came into the ED. She notes the bleeding is well controlled and she denies any numbness, tingling, reduced range of motion, fevers or chills. The history is provided by the patient.    Laceration          Past Medical History:   Diagnosis Date    Anxiety     Anxiety     Chest pain     Depression     Diabetes ([facility] [address]     Fatigue     Headache     Headache(784.0)     Ill-defined condition     \"cervicle stenosis\"    Memory loss     Neurological disorder     siezures    Neuropathy     PTSD (post-traumatic stress disorder)     Rash     Ringing in ears     Ringing in ears     Skipped beats     Snoring     Visual disturbance        Past Surgical History:   Procedure Laterality Date    HX GYN           Family History:   Problem Relation Age of Onset    Headache Mother     Neuropathy Mother     Cancer Father     Seizures Other     Heart Disease Other     Stroke Other     Seizures Maternal Grandmother        Social History     Socioeconomic History    Marital status: SINGLE     Spouse name: Not on file    Number of children: Not on file    Years of education: Not on file    Highest education level: Not on file   Occupational History    Not on file   Tobacco Use    Smoking status: Current Every Day Smoker     Packs/day: 0.50    Smokeless tobacco: Never Used   Substance and Sexual Activity    Alcohol use: Yes     Comment: weekends    Drug use: No    Sexual activity: Not on file   Other Topics Concern    Not on file   Social History Narrative    Not on file     Social Determinants of Health     Financial Resource Strain:     Difficulty of Paying Living Expenses: Not on file   Food Insecurity:     Worried About [address] [employer] in the Last Year: Not on file    Forrest of Food in the Last Year: Not on file   Transportation Needs:     Lack of Transportation (Medical): Not on file    Lack of Transportation (Non-Medical): Not on file   Physical Activity:     Days of Exercise per Week: Not on file    Minutes of Exercise per Session: Not on file   Stress:     Feeling of Stress : Not on file   Social Connections:     Frequency of Communication with Friends and Family: Not on file    Frequency of Social Gatherings with Friends and Family: Not on file    Attends Catholic Services: Not on file    Active Member of [address] [place] [address] or Organizations: Not on file    Attends Club or Organization Meetings: Not on file    Marital Status: Not on file   Intimate Partner Violence:     Fear of Current or Ex-Partner: Not on file    Emotionally Abused: Not on file    Physically Abused: Not on file    Sexually Abused: Not on file   Housing Stability:     Unable to Pay for Housing in the Last Year: Not on file    Number of [place] in the Last Year: Not on file    Unstable Housing in the Last Year: Not on file         ALLERGIES: Latex, Penicillins, Shellfish derived, Pcn [penicillins], Tree nut, and Zoloft [sertraline]    Review of Systems   Constitutional: Negative for fever. HENT: Negative for congestion and sinus pressure. Respiratory: Negative for shortness of breath. Cardiovascular: Negative for chest pain. Gastrointestinal: Negative for nausea and vomiting. Genitourinary: Negative for dysuria. Musculoskeletal: Negative for myalgias. Skin: Positive for wound. Neurological: Negative for dizziness and headaches. Hematological: Negative for adenopathy. Psychiatric/Behavioral: The patient is not nervous/anxious. All other systems reviewed and are negative.       Vitals:    07/04/22 2014   BP: 110/65   Pulse: 87   Resp: 18   Temp: 98.6 °F (37 °C)   SpO2: 98%   Weight: 87.1 kg (192 lb)   Height: 5' 2\" (1.575 m)            Physical Exam  Vitals and nursing note reviewed. Constitutional:       General: She is not in acute distress. Appearance: Normal appearance. She is normal weight. HENT:      Head: Normocephalic and atraumatic. Eyes:      Extraocular Movements: Extraocular movements intact. Pupils: Pupils are equal, round, and reactive to light. Cardiovascular:      Rate and Rhythm: Normal rate and regular rhythm. Heart sounds: Normal heart sounds. Pulmonary:      Breath sounds: Normal breath sounds. Abdominal:      Palpations: Abdomen is soft. Tenderness: There is no abdominal tenderness. Musculoskeletal:      Right hand: Normal range of motion. Normal pulse. Lymphadenopathy:      Cervical: No cervical adenopathy. Skin:     General: Skin is warm and dry. Findings: Laceration (Small 1 cm laceration noted with small shard of glass sticking out) present. Neurological:      General: No focal deficit present. Mental Status: She is alert and oriented to person, place, and time. Psychiatric:         Mood and Affect: Mood normal.         Behavior: Behavior normal.         Thought Content: Thought content normal.          MDM  Number of Diagnoses or Management Options  Laceration of right hand with foreign body, initial encounter  Diagnosis management comments: 79-year-old female with history of anxiety, depression, diabetes, PTSD and neuropathy presents to ED with foreign body and right hand. Vital signs stable in triage. Physical exam notable for small 1 cm laceration noted to right palm with small shard of glass sticking out. X-ray revealed 7 mm radiopaque foreign body adjacent to fifth metacarpal head but otherwise no acute abnormalities. Area numbed with lidocaine and foreign body removed. Laceration repaired as outlined below. Tdap updated.   Patient will be discharged with instructions for conservative care, follow-up and return precautions. Amount and/or Complexity of Data Reviewed  Tests in the radiology section of CPT®: reviewed           Wound Repair    Date/Time: 7/4/2022 9:25 PM  Performed by: PAPreparation: skin prepped with alcohol and skin prepped with Betadine  Pre-procedure re-eval: Immediately prior to the procedure, the patient was reevaluated and found suitable for the planned procedure and any planned medications. Time out: Immediately prior to the procedure a time out was called to verify the correct patient, procedure, equipment, staff and marking as appropriate. .  Location details: right hand  Wound length:2.5 cm or less  Anesthesia: local infiltration    Anesthesia:  Local Anesthetic: lidocaine 1% without epinephrine  Anesthetic total: 4 mL  Foreign bodies: glass  Irrigation solution: saline  Irrigation method: syringe  Debridement: none  Skin closure: glue  Approximation: close  Dressing: gauze roll  Patient tolerance: patient tolerated the procedure well with no immediate complications  My total time at bedside, performing this procedure was 1-15 minutes. Foreign Body Removal    Date/Time: 7/4/2022 9:25 PM  Performed by: SO Lancaster  Authorized by: SO Lancaster     Consent:     Consent obtained:  Verbal    Consent given by:  Patient    Risks discussed:  Bleeding, infection, pain, incomplete removal, nerve damage and poor cosmetic result    Alternatives discussed:  No treatment  Location:     Location:  Hand    Hand location:  R palm    Depth: Intradermal    Tendon involvement:  None  Pre-procedure details:     Imaging:  X-ray    Neurovascular status: intact    Anesthesia (see MAR for exact dosages):      Anesthesia method:  Local infiltration    Local anesthetic:  Lidocaine 1% w/o epi  Procedure type:     Procedure complexity:  Simple  Procedure details:     Localization method:  Visualized    Dissection of underlying tissues: no      Bloodless field: no Removal mechanism:  Hemostat    Foreign bodies recovered:  1    Description:  Small shard of glass    Intact foreign body removal: yes    Post-procedure details:     Neurovascular status: intact      Confirmation:  No additional foreign bodies on visualization    Dressing:  Non-adherent dressing    Patient tolerance of procedure:   Tolerated well, no immediate complications

## 2022-11-05 PROBLEM — E11.9 DIABETES (HCC): Status: ACTIVE | Noted: 2022-11-05

## 2022-11-07 ENCOUNTER — OFFICE VISIT (OUTPATIENT)
Dept: OBGYN CLINIC | Age: 42
End: 2022-11-07
Payer: COMMERCIAL

## 2022-11-07 VITALS
SYSTOLIC BLOOD PRESSURE: 117 MMHG | WEIGHT: 198.6 LBS | HEIGHT: 62 IN | BODY MASS INDEX: 36.55 KG/M2 | DIASTOLIC BLOOD PRESSURE: 77 MMHG

## 2022-11-07 DIAGNOSIS — N95.1 MENOPAUSAL SYNDROME: ICD-10-CM

## 2022-11-07 DIAGNOSIS — Z01.419 ROUTINE GYNECOLOGICAL EXAMINATION: Primary | ICD-10-CM

## 2022-11-07 DIAGNOSIS — Z12.31 BREAST CANCER SCREENING BY MAMMOGRAM: ICD-10-CM

## 2022-11-07 PROCEDURE — 99212 OFFICE O/P EST SF 10 MIN: CPT | Performed by: OBSTETRICS & GYNECOLOGY

## 2022-11-07 NOTE — PROGRESS NOTES
ANNUAL EXAM AGES 40-64      Froylan Quiroz is a ,  36 y.o. female   No LMP recorded. (Menstrual status: IUD). From nurses Note:     No LMP recorded. Patient has had an implant. Problems: no significant problems  Birth Control: IUD. Mirena inserted 2014. Last Pap: normal obtained 3 year(s) ago. Last Mammogram: has not had a recent mammogram.  2018 negative. Last Bone Density: not obtained. Last colonoscopy: not obtained. 1. Have you been to the ER, urgent care clinic, or hospitalized since your last visit? Yes 2022 laceration right hand. 2. Have you seen or consulted any other health care providers outside of the [address] [place] [address] since your last visit? No     Pt has stopped all her meds. Sexual history:  She  reports being sexually active and has had partner(s) who are male. She reports using the following method of birth control/protection: I.U.D..    Medical conditions:  Since her last annual GYN exam about  3 year sago, she has not the following changes in her health history: none. Gyn Flowsheet:  No flowsheet data found. Breast Cancer History: negative. Osteoporosis History:  Family history does not include a first or second degree relative with osteopenia or osteoporosis. A bone density scan has not been obtained. She is currently taking calcium and vit D.     Medical History:  Patient Active Problem List   Diagnosis Code    Anxiety F41.9    Spell of altered consciousness R40.4    Transient alteration of awareness R40.4    Analgesic overuse headache G44.40, T39.95XA    Migraine headache G43.909    Diabetes ([address] E11.9     Past Medical History:   Diagnosis Date    Anxiety     Asthma     Chest pain     Condyloma acuminata     Congenital uterine anomaly     Heart Shaped Septum    Depression     Diabetes ([address]     Endometriosis     Fatigue     Fibrocystic breast changes, bilateral     Genital herpes     Heavy periods     IBS (irritable bowel syndrome)     Ill-defined condition     \"cervicle stenosis\"    IUD (intrauterine device) in place 2014    Mirena    Memory loss     Migraines     Dr Derek Caldera    Neurological disorder     siezures    Neuropathy     PTSD (post-traumatic stress disorder)     Rash     Ringing in ears     Severe dysmenorrhea     Skipped beats     Sleep apnea     Snoring     Visual disturbance      Past Surgical History:   Procedure Laterality Date    HX GYN          HX GYN      Laser to Condyloma    HX PELVIC LAPAROSCOPY  2000    Ablation of Endometriosis     OB History    Para Term  AB Living   2 1 1 0 1 1   SAB IAB Ectopic Molar Multiple Live Births   0 1 0 0 0 1      # Outcome Date GA Lbr Kai/2nd Weight Sex Delivery Anes PTL Lv   2 Term 08 40w0d  6 lb 8 oz (2.948 kg) F Vag-Spont EPI  NELY      Name: Libia Hutchins IAB               Obstetric Comments   Menarche 14     Social History     Socioeconomic History    Marital status: SINGLE   Occupational History    Occupation: [employer]   Tobacco Use    Smoking status: Every Day     Packs/day: 0.50     Types: Cigarettes     Start date:     Smokeless tobacco: Never    Tobacco comments:     Quite  Restarted    Vaping Use    Vaping Use: Never used   Substance and Sexual Activity    Alcohol use: Yes     Comment: weekends    Drug use: No    Sexual activity: Yes     Partners: Male     Birth control/protection: I.U.D.       Family History   Problem Relation Age of Onset    Thyroid Disease Mother     Headache Mother     Neuropathy Mother     Depression Mother     Heart Disease Father     Seizures Father     Stroke Father     Diabetes Father     Cancer Father     High Cholesterol Maternal Grandmother     Heart Disease Maternal Grandmother     Seizures Maternal Grandmother     Diabetes Paternal Grandmother     Diabetes Paternal Aunt      Current Outpatient Medications on File Prior to Visit   Medication Sig Dispense Refill    doxylamine succinate (Sleep Aid, Doxylamine,) 25 mg tablet Take 25 mg by mouth nightly as needed. No current facility-administered medications on file prior to visit. Allergies   Allergen Reactions    Latex Rash    Penicillins Anaphylaxis    Shellfish Derived Anaphylaxis    Tree Nut Swelling    Zoloft [Sertraline] Diarrhea       Review of Systems:  History obtained from the patient-negative for:  Constitutional: weight loss, fever, night sweats  HEENT: hearing loss, earache, congestion, snoring, sorethroat  CV: chest pain, palpitations, edema  Resp: cough, shortness of breath, wheezing  Breast: breast lumps, nipple discharge, galactorrhea  GI: change in bowel habits, abdominal pain, black or bloody stools  : frequency, dysuria, hematuria, vaginal discharge  MSK: back pain, joint pain, muscle pain  Skin: itching, rash, hives  Neuro: dizziness, headache, confusion, weakness  Psych: anxiety, depression, change in mood  Heme/lymph: bleeding, bruising, pallor    Physical Exam  Visit Vitals  /77   Ht 5' 2\" (1.575 m)   Wt 198 lb 9.6 oz (90.1 kg)   BMI 36.32 kg/m²       Constitutional  Appearance: well-nourished, well developed, alert, in no acute distress    HENT  Head and Face: appears normal    Neck  Inspection/Palpation: normal appearance, no masses or tenderness  Lymph Nodes: no lymphadenopathy present  Thyroid: gland size normal, nontender, no nodules or masses present on palpation    Chest  Respiratory Effort: breathing unlabored  Auscultation: normal breath sounds    Cardiovascular  Heart:   Auscultation: regular rate and rhythm without murmur    Breasts  Inspection of Breasts: breasts symmetrical, no skin changes, no discharge present, nipple appearance normal, no skin retraction present  Palpation of Breasts and Axillae: no masses present on palpation, no breast tenderness  Axillary Lymph Nodes: no lymphadenopathy present    Gastrointestinal  Abdominal Examination: abdomen non-tender to palpation, normal bowel sounds, no masses present  Liver and spleen: no hepatomegaly present, spleen not palpable  Hernias: no hernias identified    Genitourinary  External Genitalia: normal appearance for age, no discharge present, no tenderness present, no inflammatory lesions present, no masses present, no atrophy present  Vagina: normal vaginal vault without central or paravaginal defects, no discharge present, no inflammatory lesions present, no masses present  Bladder: non-tender to palpation  Urethra: appears normal  Cervix: normal   Uterus: normal size, shape and consistency  Adnexa: no adnexal tenderness present, no adnexal masses present  Perineum: perineum within normal limits, no evidence of trauma, no rashes or skin lesions present  Anus: anus within normal limits, no hemorrhoids present  Inguinal Lymph Nodes: no lymphadenopathy present    Skin  General Inspection: no rash, no lesions identified    Neurologic/Psychiatric  Mental Status:  Orientation: grossly oriented to person, place and time  Mood and Affect: mood normal, affect appropriate    Labs:  No results found for this or any previous visit (from the past 12 hour(s)). Assessment:    ICD-10-CM ICD-9-CM    1. Routine gynecological examination  Z01.419 V72.31 PAP IG, APTIMA HPV AND RFX 16/18,45 ([id number])      2. Menopausal syndrome  N95.1 627.2 FSH AND LH      ESTRADIOL      THYROID CASCADE PROFILE      3. Breast cancer screening by mammogram  Z12.31 V76.12 [facility] MAMMO BI SCREENING INCL CAD          Plan:  Counseled re: diet, exercise, healthy lifestyle  Rec annual mammogram  Wants to keep IUD. Is at 8 years now. Still effective just reduced due to lack of hormones  No follow-ups on file.

## 2023-02-02 ENCOUNTER — HOSPITAL ENCOUNTER (OUTPATIENT)
Dept: MAMMOGRAPHY | Age: 43
Discharge: HOME | End: 2023-02-02
Attending: OBSTETRICS & GYNECOLOGY
Payer: MEDICAID

## 2023-02-02 DIAGNOSIS — Z12.31 BREAST CANCER SCREENING BY MAMMOGRAM: ICD-10-CM

## 2023-02-02 PROCEDURE — 77067 SCR MAMMO BI INCL CAD: CPT

## 2023-09-04 NOTE — PROCEDURES
Cyrus Tsaielsen keyla [address]   [facility], [address]   ROUTINE EEG REPORT       Name:  Jazlyn Orosco   MR#:  [id number]   :  1980   Account #:  [de-identified]    Date of Procedure:  2017   Date of Adm:  2017       From 2017 at 3:46 p.m. and ultimately concludes at 6:24:16 on   2017. The dictation picks up around 6 a.m. on 2017 where   it had been delayed with the need to restart the machine. The patient   starts off with what appears to be light sleep and wakefulness. At 8   a.m., there is a phone call or she initiates a phone call and she is fully   awake. About 8:52, she is eating. By 9:43, she is out of bed and there   is a disconnect. At 10:38, there is a trial photic stimulation to see if   there may be an enhancement to seizure production, but there is   entrainment of photic drive, but no overt electrographic seizure activity   seen. There is motion artifact. At noon, the patient is eating again. Then she becomes drowsy and has some stages of alertness noted by   1427:07. By 1503 p.m., she gets sick. Unfortunately, she vomits and   nursing is called and there is motion artifact. By 3457, she attends to a   phone call and then the record stops at 21 : 16 p.m. IMPRESSION: This additional recording fails to show any andie claire   electrographic seizure activity or spells captured and otherwise considered normal. This   complements the first part of the dictation, which again was normal and   did not positively identify electrographic seizure/spells. All in all, this is a   completely normal study of the 24-hour period from connection to   disconnection. Clinical correlation is advised.         Deejay Galarza MD      [facility] / Imelda Irwin   D:  2017   17:35   T:  2017   15:19   Job #:  [id number] normal...

## 2023-09-13 NOTE — PROGRESS NOTES
Azra Mathews is a 36 y.o. female returns for an annual exam     No chief complaint on file. No LMP recorded. Patient has had an implant. Problems: no significant problems  Birth Control: IUD. Mirena inserted 12/5/2014. Last Pap: normal obtained 3 year(s) ago. Last Mammogram: has not had a recent mammogram.  2018 negative. Last Bone Density: not obtained. Last colonoscopy: not obtained. 1. Have you been to the ER, urgent care clinic, or hospitalized since your last visit? Yes 7/4/2022 laceration right hand. 2. Have you seen or consulted any other health care providers outside of the [address] [place] [address] since your last visit?  No     Examination chaperoned by Chano Hunter MA. no

## 2023-12-18 PROBLEM — Z31.9 PATIENT DESIRES PREGNANCY: Status: ACTIVE | Noted: 2023-12-18

## 2024-08-13 ENCOUNTER — ROUTINE PRENATAL (OUTPATIENT)
Age: 44
End: 2024-08-13
Payer: MEDICAID

## 2024-08-13 VITALS
RESPIRATION RATE: 16 BRPM | BODY MASS INDEX: 32.79 KG/M2 | SYSTOLIC BLOOD PRESSURE: 113 MMHG | WEIGHT: 178.2 LBS | DIASTOLIC BLOOD PRESSURE: 73 MMHG | HEART RATE: 83 BPM | HEIGHT: 62 IN

## 2024-08-13 DIAGNOSIS — D25.1 INTRAMURAL LEIOMYOMA OF UTERUS: ICD-10-CM

## 2024-08-13 DIAGNOSIS — N91.2 AMENORRHEA: Primary | ICD-10-CM

## 2024-08-13 PROCEDURE — 99213 OFFICE O/P EST LOW 20 MIN: CPT | Performed by: OBSTETRICS & GYNECOLOGY

## 2024-08-13 NOTE — PROGRESS NOTES
Jayme Ritchie is a 44 y.o. female presents for a problem visit.    Chief Complaint   Patient presents with    Follow-up    Ultrasound     No LMP recorded. (Menstrual status: Other - See Notes).  Birth Control: none.  Last Pap: normal obtained 12/18/2023.    The patient is reporting having: Ultrasound Follow Up for amenorrhea. Patient states LMP was April.    Ultrasound report: TRANSVAGINAL ULTRASOUND PERFORMED UTERUS IS ANTEVERTED, NORMAL IN SIZE AND HETEROGENEOUS IN ECHOGENICITY. ENDOMETRIUM MEASURES 7-8MM IN THICKNESS. NO IUP IS SEEN. RIGHT OVARY APPEARS WITHIN NORMAL LIMITS. LEFT OVARY APPEARS WITHIN NORMAL LIMITS. NO FREE FLUID SEEN IN THE CDS       1. Have you been to the ER, urgent care clinic, or hospitalized since your last visit? No    2. Have you seen or consulted any other health care providers outside of the [facility] since your last visit? No    Examination chaperoned by MAKENNA GARCIA CMA.

## 2024-08-13 NOTE — PROGRESS NOTES
AMENORRHEA EVALUATION      Jayme Ritchie is a 44 y.o. female who complains of absence of menses.   IUD was removed .  Had periods  - April then no cycle since.   Pos preg test 7 weeks ago.  Some lethargy.   Does have hot flashes & sweats.      Ultrasound was done today to evaluate these complaints and shows:   TRANSVAGINAL ULTRASOUND PERFORMED UTERUS IS ANTEVERTED, NORMAL IN SIZE AND HETEROGENEOUS IN ECHOGENICITY. ENDOMETRIUM MEASURES 7-8MM IN THICKNESS. NO IUP IS SEEN. RIGHT OVARY APPEARS WITHIN NORMAL LIMITS. LEFT OVARY APPEARS WITHIN NORMAL LIMITS. NO FREE FLUID SEEN IN THE CDS.       Medical History   Patient Active Problem List   Diagnosis    Migraine headache    Analgesic overuse headache    Anxiety    Spell of altered consciousness    Transient alteration of awareness    Diabetes (HCC)    Patient desires pregnancy     Past Medical History:   Diagnosis Date    Anxiety     Asthma     Chest pain     Condyloma acuminata     Congenital uterine anomaly     Heart Shaped Septum    Depression     Diabetes (HCC)     Endometriosis     Fatigue     Fibrocystic breast changes, bilateral     Genital herpes     Heavy periods     Hypercholesterolemia     IBS (irritable bowel syndrome)     Ill-defined condition     \"cervicle stenosis\"    IUD (REMOVED) 2014    Mirena  Removed 23    Memory loss     Migraines     Dr Alonzo    Neurological disorder     siezures    Neuropathy     PTSD (post-traumatic stress disorder)     Rash     Ringing in ears     Severe dysmenorrhea     Skipped beats     Sleep apnea     Snoring     Visual disturbance      Past Surgical History:   Procedure Laterality Date    INDUCED       LASER ABLATION OF CONDYLOMAS      Laser to Condyloma    PELVIC LAPAROSCOPY  2000    Ablation of Endometriosis     OB History    Para Term  AB Living   2 1 1 0 1 1   SAB IAB Ectopic Molar Multiple Live Births   0 1 0 0 0 1      # Outcome Date GA Lbr Omar/2nd Weight Sex Type

## 2024-08-14 LAB
EST. AVERAGE GLUCOSE BLD GHB EST-MCNC: 103 MG/DL
ESTRADIOL SERPL-MCNC: 16.5 PG/ML
FSH SERPL-ACNC: 22.8 MIU/ML
HBA1C MFR BLD: 5.2 % (ref 4–5.6)
HCG SERPL-ACNC: 1 MIU/ML (ref 0–6)
LH SERPL-ACNC: 8.7 MIU/ML

## 2024-08-21 ENCOUNTER — TELEPHONE (OUTPATIENT)
Age: 44
End: 2024-08-21

## 2024-10-04 ENCOUNTER — TELEPHONE (OUTPATIENT)
Age: 44
End: 2024-10-04

## 2024-10-04 DIAGNOSIS — N94.6 SEVERE DYSMENORRHEA: Primary | ICD-10-CM

## 2024-10-04 RX ORDER — NAPROXEN 500 MG/1
500 TABLET ORAL 2 TIMES DAILY WITH MEALS
Qty: 60 TABLET | Refills: 0 | Status: SHIPPED | OUTPATIENT
Start: 2024-10-04

## 2024-10-04 NOTE — TELEPHONE ENCOUNTER
PT call returned name and  verified      RN relayed MD message:  Rashaun Roth II, MD   to Me       10/4/24 12:11 PM  Yes I will send her in some Naproxen.  I wonder if this was the withdrawal period from the Provera at last visit?      PT verbalizes understanding. Bleeding precautions reviewed with PT to monitor and report if she is saturating a pad within 30 minutes and passing golf ball size or egg size clots or bigger.  PT voices understanding.

## 2024-10-04 NOTE — TELEPHONE ENCOUNTER
PT name and  verified    45 yo last ov 24, last ae 23    PT calling stating she was seeing if she could get anything for cramping, she has tried otc Tylenol and it did not help, RN asked if she had tried Ibuprofen and she has not. PT started her period 10/1.  PT reports she did not have a period from April-Aug this year, then started a couple of days after she was seen in the office, did not have a period in September and started 10/1.  PT reports she has heavy bleeding and cramping. RN asked if she was saturating a pad within an hour or less, PT states \"maybe an hour and a half\", and that she has gone through regular 22 sharon ad pack in a day and a half. PT reports she has clots and rnage from dime size to quarter size.    Please review and advise  Preferred pharmacy reviewed      Thank you

## 2025-01-03 ENCOUNTER — APPOINTMENT (OUTPATIENT)
Facility: HOSPITAL | Age: 45
End: 2025-01-03
Payer: MEDICAID

## 2025-01-03 ENCOUNTER — HOSPITAL ENCOUNTER (EMERGENCY)
Facility: HOSPITAL | Age: 45
Discharge: HOME | End: 2025-01-03
Attending: EMERGENCY MEDICINE
Payer: MEDICAID

## 2025-01-03 VITALS
HEIGHT: 62 IN | SYSTOLIC BLOOD PRESSURE: 132 MMHG | OXYGEN SATURATION: 100 % | WEIGHT: 175 LBS | RESPIRATION RATE: 18 BRPM | HEART RATE: 97 BPM | BODY MASS INDEX: 32.2 KG/M2 | TEMPERATURE: 97.9 F | DIASTOLIC BLOOD PRESSURE: 86 MMHG

## 2025-01-03 DIAGNOSIS — S82.831A CLOSED FRACTURE OF DISTAL END OF RIGHT FIBULA, UNSPECIFIED FRACTURE MORPHOLOGY, INITIAL ENCOUNTER: Primary | ICD-10-CM

## 2025-01-03 PROCEDURE — 73590 X-RAY EXAM OF LOWER LEG: CPT

## 2025-01-03 PROCEDURE — 73610 X-RAY EXAM OF ANKLE: CPT

## 2025-01-03 PROCEDURE — 99283 EMERGENCY DEPT VISIT LOW MDM: CPT

## 2025-01-03 RX ORDER — HYDROCODONE BITARTRATE AND ACETAMINOPHEN 5; 325 MG/1; MG/1
1 TABLET ORAL EVERY 6 HOURS PRN
Qty: 12 TABLET | Refills: 0 | Status: SHIPPED | OUTPATIENT
Start: 2025-01-03 | End: 2025-01-06

## 2025-01-03 ASSESSMENT — PAIN DESCRIPTION - LOCATION: LOCATION: ANKLE

## 2025-01-03 ASSESSMENT — PAIN - FUNCTIONAL ASSESSMENT: PAIN_FUNCTIONAL_ASSESSMENT: 0-10

## 2025-01-03 ASSESSMENT — PAIN DESCRIPTION - DESCRIPTORS: DESCRIPTORS: THROBBING;SHARP

## 2025-01-03 ASSESSMENT — PAIN SCALES - GENERAL
PAINLEVEL_OUTOF10: 6
PAINLEVEL_OUTOF10: 10

## 2025-01-03 ASSESSMENT — PAIN DESCRIPTION - ORIENTATION: ORIENTATION: RIGHT

## 2025-01-04 NOTE — ED PROVIDER NOTES
(36.6 °C) Oral 97 18 100 %      Height Weight - Scale         1.575 m (5' 2\") 79.4 kg (175 lb)             Body mass index is 32.01 kg/m².    Physical Exam  Vitals and nursing note reviewed.   Constitutional:       Appearance: Normal appearance.   HENT:      Head: Normocephalic and atraumatic.      Mouth/Throat:      Mouth: Mucous membranes are moist.   Eyes:      Conjunctiva/sclera: Conjunctivae normal.   Cardiovascular:      Rate and Rhythm: Normal rate.   Pulmonary:      Effort: Pulmonary effort is normal.   Abdominal:      General: There is no distension.   Musculoskeletal:         General: No deformity.      Cervical back: No rigidity.      Comments: Moderate right ankle swelling and tenderness.  2+ pedal pulses.   Skin:     General: Skin is warm and dry.   Neurological:      Mental Status: She is alert.   Psychiatric:         Mood and Affect: Mood normal.         DIAGNOSTIC RESULTS     EKG: All EKG's are interpreted by the Emergency Department Physician who either signs or Co-signs this chart in the absence of a cardiologist.        RADIOLOGY:   Non-plain film images such as CT, Ultrasound and MRI are read by the radiologist. Plain radiographic images are visualized and preliminarily interpreted by the emergency physician with the below findings:        Interpretation per the Radiologist below, if available at the time of this note:    No orders to display        LABS:  Labs Reviewed - No data to display    All other labs were within normal range or not returned as of this dictation.    EMERGENCY DEPARTMENT COURSE and DIFFERENTIAL DIAGNOSIS/MDM:   Vitals:    Vitals:    01/03/25 1942   BP: (!) 129/97   Pulse: 97   Resp: 18   Temp: 97.9 °F (36.6 °C)   TempSrc: Oral   SpO2: 100%   Weight: 79.4 kg (175 lb)   Height: 1.575 m (5' 2\")           Medical Decision Making  Amount and/or Complexity of Data Reviewed  Radiology: ordered.    Risk  Prescription drug management.            REASSESSMENT      Progress  Note:  Results, treatment, and follow up plan have been discussed with patient.  Questions were answered.   Jet Esteban MD  8:54 PM        CONSULTS:  None    PROCEDURES:  Unless otherwise noted below, none     Procedures      FINAL IMPRESSION      Assessment/plan: 44-year-old with multiple medical problems who presents with right ankle pain and swelling.  She injured it 8 days ago.  Differential diagnosis includes fracture, sprain, dislocation, contusion, and others.  Reassuring appearance/exam with stable vital signs.  X-ray shows:  Slightly displaced oblique fracture distal fibular shaft.   Neurovascularly intact.  Home with posterior splint and crutches.  Rest, ice, elevation, ibuprofen.  Limited Lortab.  Ortho follow-up.  Return precautions.  Jet Esteban MD  8:55 PM    DISPOSITION/PLAN   DISPOSITION        PATIENT REFERRED TO:  No follow-up provider specified.    DISCHARGE MEDICATIONS:  New Prescriptions    No medications on file         (Please note that portions of this note were completed with a voice recognition program.  Efforts were made to edit the dictations but occasionally words are mis-transcribed.)    Jet Esteban MD (electronically signed)  Emergency Attending Physician / Physician Assistant / Nurse Practitioner             Jet Esteban MD  01/03/25 0493

## 2025-01-04 NOTE — ED TRIAGE NOTES
Pt arrives to ED via POV ambulatory c/o R ankle pain after rolling her ankle on 12/26/24 and reports pain and swelling have not improved. Pt reports little to no improvement with ibuprofen, tylenol, ice, and elevation of extremity. Provider in triage.

## 2025-01-22 ENCOUNTER — ANESTHESIA EVENT (OUTPATIENT)
Facility: HOSPITAL | Age: 45
End: 2025-01-22
Payer: MEDICAID

## 2025-01-23 ENCOUNTER — APPOINTMENT (OUTPATIENT)
Facility: HOSPITAL | Age: 45
End: 2025-01-23
Attending: ORTHOPAEDIC SURGERY
Payer: MEDICAID

## 2025-01-23 ENCOUNTER — HOSPITAL ENCOUNTER (OUTPATIENT)
Facility: HOSPITAL | Age: 45
Setting detail: OUTPATIENT SURGERY
Discharge: HOME | End: 2025-01-23
Attending: ORTHOPAEDIC SURGERY | Admitting: ORTHOPAEDIC SURGERY
Payer: MEDICAID

## 2025-01-23 ENCOUNTER — ANESTHESIA (OUTPATIENT)
Facility: HOSPITAL | Age: 45
End: 2025-01-23
Payer: MEDICAID

## 2025-01-23 VITALS
DIASTOLIC BLOOD PRESSURE: 74 MMHG | HEIGHT: 62 IN | OXYGEN SATURATION: 98 % | SYSTOLIC BLOOD PRESSURE: 121 MMHG | BODY MASS INDEX: 33.59 KG/M2 | WEIGHT: 182.54 LBS | HEART RATE: 84 BPM | TEMPERATURE: 98.2 F | RESPIRATION RATE: 12 BRPM

## 2025-01-23 LAB — HCG UR QL: NEGATIVE

## 2025-01-23 PROCEDURE — 2580000003 HC RX 258: Performed by: ANESTHESIOLOGY

## 2025-01-23 PROCEDURE — 6360000002 HC RX W HCPCS: Performed by: ORTHOPAEDIC SURGERY

## 2025-01-23 PROCEDURE — 6360000002 HC RX W HCPCS: Performed by: ANESTHESIOLOGY

## 2025-01-23 PROCEDURE — 2709999900 HC NON-CHARGEABLE SUPPLY: Performed by: ORTHOPAEDIC SURGERY

## 2025-01-23 PROCEDURE — 3600000014 HC SURGERY LEVEL 4 ADDTL 15MIN: Performed by: ORTHOPAEDIC SURGERY

## 2025-01-23 PROCEDURE — 3700000000 HC ANESTHESIA ATTENDED CARE: Performed by: ORTHOPAEDIC SURGERY

## 2025-01-23 PROCEDURE — 3700000001 HC ADD 15 MINUTES (ANESTHESIA): Performed by: ORTHOPAEDIC SURGERY

## 2025-01-23 PROCEDURE — 7100000001 HC PACU RECOVERY - ADDTL 15 MIN: Performed by: ORTHOPAEDIC SURGERY

## 2025-01-23 PROCEDURE — 6360000002 HC RX W HCPCS: Performed by: NURSE ANESTHETIST, CERTIFIED REGISTERED

## 2025-01-23 PROCEDURE — 2720000010 HC SURG SUPPLY STERILE: Performed by: ORTHOPAEDIC SURGERY

## 2025-01-23 PROCEDURE — 7100000000 HC PACU RECOVERY - FIRST 15 MIN: Performed by: ORTHOPAEDIC SURGERY

## 2025-01-23 PROCEDURE — 7100000011 HC PHASE II RECOVERY - ADDTL 15 MIN: Performed by: ORTHOPAEDIC SURGERY

## 2025-01-23 PROCEDURE — 81025 URINE PREGNANCY TEST: CPT

## 2025-01-23 PROCEDURE — 64445 NJX AA&/STRD SCIATIC NRV IMG: CPT | Performed by: ANESTHESIOLOGY

## 2025-01-23 PROCEDURE — C1713 ANCHOR/SCREW BN/BN,TIS/BN: HCPCS | Performed by: ORTHOPAEDIC SURGERY

## 2025-01-23 PROCEDURE — 2580000003 HC RX 258: Performed by: ORTHOPAEDIC SURGERY

## 2025-01-23 PROCEDURE — 7100000010 HC PHASE II RECOVERY - FIRST 15 MIN: Performed by: ORTHOPAEDIC SURGERY

## 2025-01-23 PROCEDURE — 3600000004 HC SURGERY LEVEL 4 BASE: Performed by: ORTHOPAEDIC SURGERY

## 2025-01-23 PROCEDURE — 2500000003 HC RX 250 WO HCPCS: Performed by: NURSE ANESTHETIST, CERTIFIED REGISTERED

## 2025-01-23 DEVICE — DISTAL LATERAL FIBULA PLATE, 8 HOLE
Type: IMPLANTABLE DEVICE | Site: ANKLE | Status: FUNCTIONAL
Brand: VARIAX

## 2025-01-23 DEVICE — BONE SCREW
Type: IMPLANTABLE DEVICE | Site: ANKLE | Status: FUNCTIONAL
Brand: VARIAX

## 2025-01-23 DEVICE — K-LESS T-ROPE W/DRV, SYN REPR, TI
Type: IMPLANTABLE DEVICE | Site: ANKLE | Status: FUNCTIONAL
Brand: ARTHREX®

## 2025-01-23 DEVICE — LOCKING SCREW
Type: IMPLANTABLE DEVICE | Site: ANKLE | Status: FUNCTIONAL
Brand: VARIAX

## 2025-01-23 RX ORDER — FENTANYL CITRATE 50 UG/ML
INJECTION, SOLUTION INTRAMUSCULAR; INTRAVENOUS
Status: DISCONTINUED | OUTPATIENT
Start: 2025-01-23 | End: 2025-01-23 | Stop reason: SDUPTHER

## 2025-01-23 RX ORDER — MIDAZOLAM HYDROCHLORIDE 2 MG/2ML
2 INJECTION, SOLUTION INTRAMUSCULAR; INTRAVENOUS
Status: DISCONTINUED | OUTPATIENT
Start: 2025-01-23 | End: 2025-01-23 | Stop reason: HOSPADM

## 2025-01-23 RX ORDER — SODIUM CHLORIDE 9 MG/ML
INJECTION, SOLUTION INTRAVENOUS CONTINUOUS
Status: DISCONTINUED | OUTPATIENT
Start: 2025-01-23 | End: 2025-01-23 | Stop reason: HOSPADM

## 2025-01-23 RX ORDER — DIPHENHYDRAMINE HYDROCHLORIDE 50 MG/ML
12.5 INJECTION INTRAMUSCULAR; INTRAVENOUS
Status: DISCONTINUED | OUTPATIENT
Start: 2025-01-23 | End: 2025-01-23 | Stop reason: HOSPADM

## 2025-01-23 RX ORDER — DEXAMETHASONE SODIUM PHOSPHATE 4 MG/ML
INJECTION, SOLUTION INTRA-ARTICULAR; INTRALESIONAL; INTRAMUSCULAR; INTRAVENOUS; SOFT TISSUE
Status: DISCONTINUED | OUTPATIENT
Start: 2025-01-23 | End: 2025-01-23 | Stop reason: SDUPTHER

## 2025-01-23 RX ORDER — LIDOCAINE HYDROCHLORIDE 10 MG/ML
1 INJECTION, SOLUTION EPIDURAL; INFILTRATION; INTRACAUDAL; PERINEURAL
Status: DISCONTINUED | OUTPATIENT
Start: 2025-01-23 | End: 2025-01-23 | Stop reason: HOSPADM

## 2025-01-23 RX ORDER — ONDANSETRON 2 MG/ML
INJECTION INTRAMUSCULAR; INTRAVENOUS
Status: DISCONTINUED | OUTPATIENT
Start: 2025-01-23 | End: 2025-01-23 | Stop reason: SDUPTHER

## 2025-01-23 RX ORDER — DEXMEDETOMIDINE HYDROCHLORIDE 100 UG/ML
INJECTION, SOLUTION INTRAVENOUS
Status: DISCONTINUED | OUTPATIENT
Start: 2025-01-23 | End: 2025-01-23 | Stop reason: SDUPTHER

## 2025-01-23 RX ORDER — FENTANYL CITRATE 50 UG/ML
100 INJECTION, SOLUTION INTRAMUSCULAR; INTRAVENOUS
Status: DISCONTINUED | OUTPATIENT
Start: 2025-01-23 | End: 2025-01-23 | Stop reason: HOSPADM

## 2025-01-23 RX ORDER — PROPOFOL 10 MG/ML
INJECTION, EMULSION INTRAVENOUS
Status: DISCONTINUED | OUTPATIENT
Start: 2025-01-23 | End: 2025-01-23 | Stop reason: SDUPTHER

## 2025-01-23 RX ORDER — NALOXONE HYDROCHLORIDE 0.4 MG/ML
INJECTION, SOLUTION INTRAMUSCULAR; INTRAVENOUS; SUBCUTANEOUS PRN
Status: DISCONTINUED | OUTPATIENT
Start: 2025-01-23 | End: 2025-01-23 | Stop reason: HOSPADM

## 2025-01-23 RX ORDER — BUPIVACAINE HYDROCHLORIDE 2.5 MG/ML
INJECTION, SOLUTION EPIDURAL; INFILTRATION; INTRACAUDAL
Status: DISCONTINUED | OUTPATIENT
Start: 2025-01-23 | End: 2025-01-23 | Stop reason: SDUPTHER

## 2025-01-23 RX ORDER — SODIUM CHLORIDE, SODIUM LACTATE, POTASSIUM CHLORIDE, CALCIUM CHLORIDE 600; 310; 30; 20 MG/100ML; MG/100ML; MG/100ML; MG/100ML
INJECTION, SOLUTION INTRAVENOUS CONTINUOUS
Status: DISCONTINUED | OUTPATIENT
Start: 2025-01-23 | End: 2025-01-23 | Stop reason: HOSPADM

## 2025-01-23 RX ORDER — MIDAZOLAM HYDROCHLORIDE 1 MG/ML
INJECTION, SOLUTION INTRAMUSCULAR; INTRAVENOUS
Status: DISCONTINUED | OUTPATIENT
Start: 2025-01-23 | End: 2025-01-23 | Stop reason: SDUPTHER

## 2025-01-23 RX ORDER — CLINDAMYCIN PHOSPHATE 600 MG/50ML
600 INJECTION, SOLUTION INTRAVENOUS
Status: DISCONTINUED | OUTPATIENT
Start: 2025-01-23 | End: 2025-01-23

## 2025-01-23 RX ORDER — ROPIVACAINE HYDROCHLORIDE 5 MG/ML
INJECTION, SOLUTION EPIDURAL; INFILTRATION; PERINEURAL
Status: DISCONTINUED | OUTPATIENT
Start: 2025-01-23 | End: 2025-01-23 | Stop reason: SDUPTHER

## 2025-01-23 RX ORDER — ONDANSETRON 2 MG/ML
4 INJECTION INTRAMUSCULAR; INTRAVENOUS
Status: DISCONTINUED | OUTPATIENT
Start: 2025-01-23 | End: 2025-01-23 | Stop reason: HOSPADM

## 2025-01-23 RX ADMIN — MIDAZOLAM HYDROCHLORIDE 1 MG: 1 INJECTION, SOLUTION INTRAMUSCULAR; INTRAVENOUS at 15:02

## 2025-01-23 RX ADMIN — SODIUM CHLORIDE: 9 INJECTION, SOLUTION INTRAVENOUS at 13:52

## 2025-01-23 RX ADMIN — SODIUM CHLORIDE 1500 MG: 9 INJECTION, SOLUTION INTRAVENOUS at 13:57

## 2025-01-23 RX ADMIN — ONDANSETRON 4 MG: 2 INJECTION, SOLUTION INTRAMUSCULAR; INTRAVENOUS at 15:27

## 2025-01-23 RX ADMIN — BUPIVACAINE 10 ML: 13.3 INJECTION, SUSPENSION, LIPOSOMAL INFILTRATION at 14:19

## 2025-01-23 RX ADMIN — LIDOCAINE HYDROCHLORIDE 20 MG: 20 INJECTION, SOLUTION EPIDURAL; INFILTRATION; INTRACAUDAL; PERINEURAL at 15:15

## 2025-01-23 RX ADMIN — ROPIVACAINE HYDROCHLORIDE 10 ML: 5 INJECTION, SOLUTION EPIDURAL; INFILTRATION; PERINEURAL at 14:24

## 2025-01-23 RX ADMIN — PROPOFOL 50 MG: 10 INJECTION, EMULSION INTRAVENOUS at 15:14

## 2025-01-23 RX ADMIN — LIDOCAINE HYDROCHLORIDE 60 MG: 20 INJECTION, SOLUTION EPIDURAL; INFILTRATION; INTRACAUDAL; PERINEURAL at 15:12

## 2025-01-23 RX ADMIN — PROPOFOL 100 MCG/KG/MIN: 10 INJECTION, EMULSION INTRAVENOUS at 15:13

## 2025-01-23 RX ADMIN — PROPOFOL 50 MG: 10 INJECTION, EMULSION INTRAVENOUS at 15:17

## 2025-01-23 RX ADMIN — PROPOFOL 200 MG: 10 INJECTION, EMULSION INTRAVENOUS at 15:12

## 2025-01-23 RX ADMIN — FENTANYL CITRATE 100 MCG: 50 INJECTION, SOLUTION INTRAMUSCULAR; INTRAVENOUS at 14:14

## 2025-01-23 RX ADMIN — PROPOFOL 50 MG: 10 INJECTION, EMULSION INTRAVENOUS at 15:16

## 2025-01-23 RX ADMIN — DEXMEDETOMIDINE 4 MCG: 100 INJECTION, SOLUTION INTRAVENOUS at 15:22

## 2025-01-23 RX ADMIN — MIDAZOLAM HYDROCHLORIDE 2 MG: 1 INJECTION, SOLUTION INTRAMUSCULAR; INTRAVENOUS at 14:14

## 2025-01-23 RX ADMIN — HYDROMORPHONE HYDROCHLORIDE 0.5 MG: 1 INJECTION, SOLUTION INTRAMUSCULAR; INTRAVENOUS; SUBCUTANEOUS at 16:58

## 2025-01-23 RX ADMIN — BUPIVACAINE HYDROCHLORIDE 20 ML: 2.5 INJECTION, SOLUTION EPIDURAL; INFILTRATION; INTRACAUDAL; PERINEURAL at 14:19

## 2025-01-23 RX ADMIN — DEXAMETHASONE SODIUM PHOSPHATE 8 MG: 4 INJECTION INTRA-ARTICULAR; INTRALESIONAL; INTRAMUSCULAR; INTRAVENOUS; SOFT TISSUE at 15:25

## 2025-01-23 ASSESSMENT — PAIN DESCRIPTION - PAIN TYPE
TYPE: SURGICAL PAIN

## 2025-01-23 ASSESSMENT — PAIN DESCRIPTION - LOCATION
LOCATION: ANKLE;FOOT

## 2025-01-23 ASSESSMENT — PAIN DESCRIPTION - DESCRIPTORS
DESCRIPTORS: PRESSURE;SORE
DESCRIPTORS: ACHING
DESCRIPTORS: PRESSURE;SORE

## 2025-01-23 ASSESSMENT — PAIN SCALES - GENERAL
PAINLEVEL_OUTOF10: 3
PAINLEVEL_OUTOF10: 7
PAINLEVEL_OUTOF10: 4

## 2025-01-23 ASSESSMENT — PAIN DESCRIPTION - ONSET: ONSET: ON-GOING

## 2025-01-23 ASSESSMENT — PAIN DESCRIPTION - FREQUENCY: FREQUENCY: CONTINUOUS

## 2025-01-23 ASSESSMENT — PAIN - FUNCTIONAL ASSESSMENT
PAIN_FUNCTIONAL_ASSESSMENT: 0-10
PAIN_FUNCTIONAL_ASSESSMENT: ACTIVITIES ARE NOT PREVENTED

## 2025-01-23 ASSESSMENT — PAIN DESCRIPTION - ORIENTATION
ORIENTATION: RIGHT
ORIENTATION: RIGHT;ANTERIOR

## 2025-01-23 NOTE — DISCHARGE INSTRUCTIONS
stop the spread of the virus when you cough or sneeze.  Clean and disinfect your home every day. Use household  and disinfectant wipes or sprays. Take special care to clean things that you grab with your hands. These include doorknobs, remote controls, phones, and handles on your refrigerator and microwave. And don't forget countertops, tabletops, bathrooms, and computer keyboards.  When to call for help  Call 911 anytime you think you may need emergency care. For example, call if:  You have severe trouble breathing. (You can't talk at all.)  You have constant chest pain or pressure.  You are severely dizzy or lightheaded.  You are confused or can't think clearly.  Your face and lips have a blue color.  You pass out (lose consciousness) or are very hard to wake up.  Call your doctor now if you develop symptoms such as:  Shortness of breath.  Fever.  Cough.  If you need to get care, call ahead to the doctor's office for instructions before you go. Make sure you wear a face mask, if you have one, to prevent exposing other people to the virus.  Where can you get the latest information?  The following health organizations are tracking and studying this virus. Their websites contain the most up-to-date information. You'll also learn what to do if you think you may have been exposed to the virus.  U.S. Centers for Disease Control and Prevention (CDC): The CDC provides updated news about the disease and travel advice. The website also tells you how to prevent the spread of infection. www.cdc.gov  World Health Organization (WHO): WHO offers information about the virus outbreaks. WHO also has travel advice. www.who.int  Current as of: April 1, 2020               Content Version: 12.4  © 7490-7697 [employer].   Care instructions adapted under license by your healthcare professional. If you have questions about a medical condition or this instruction, always ask your healthcare professional. [place],

## 2025-01-23 NOTE — ANESTHESIA PRE PROCEDURE
Department of Anesthesiology  Preprocedure Note       Name:  Jayme Ritchie   Age:  44 y.o.  :  1980                                          MRN:  [id number]         Date:  2025      Surgeon: Surgeon(s):  Nicolas Garcia MD    Procedure: Procedure(s):  OPEN REDUCTION INTERNAL FIXATION LATERAL MALLEOLUS FRACTURE WITH POSSIBLE SYNDESMOSIS FIXATION WITH POSSIBLE DELTOID LIGAMENT REPAIR (POPLITEAL AND SAPHENOUS BLOCK WITH CHOICE)    Medications prior to admission:   Prior to Admission medications    Medication Sig Start Date End Date Taking? Authorizing Provider   naproxen (NAPROSYN) 500 MG tablet Take 1 tablet by mouth 2 times daily (with meals) 10/4/24   Rashaun Roth II, MD   atorvastatin (LIPITOR) 20 MG tablet Take 1 tablet by mouth daily  Patient not taking: Reported on 2024   Mendoza Hollis MD   hydrOXYzine HCl (ATARAX) 25 MG tablet TAKE 1 TABLET BY MOUTH 4 TIMES DAILY AS NEEDED FOR ITCHING  Patient not taking: Reported on 2024 3/9/23   Mendoza Hollis MD   doxyLAMINE succinate (GNP SLEEP AID) 25 MG tablet Take 1 tablet by mouth  Patient not taking: Reported on 2024    Automatic Reconciliation, Ar       Current medications:    Current Facility-Administered Medications   Medication Dose Route Frequency Provider Last Rate Last Admin    lidocaine PF 1 % injection 1 mL  1 mL IntraDERmal Once PRN Jacob Ames MD        fentaNYL (SUBLIMAZE) injection 100 mcg  100 mcg IntraVENous Once PRN Jacob Ames MD        0.9 % sodium chloride infusion   IntraVENous Continuous Jacob Ames MD        midazolam PF (VERSED) injection 2 mg  2 mg IntraVENous Once PRN Jacob Ames MD        vancomycin (VANCOCIN) 1,500 mg in sodium chloride 0.9 % 250 mL IVPB ([id number])  1,500 mg IntraVENous Once Nicolas Garcia MD           Allergies:    Allergies   Allergen Reactions    Latex Rash    Penicillins Anaphylaxis    Shellfish Allergy Anaphylaxis

## 2025-01-23 NOTE — ANESTHESIA POSTPROCEDURE EVALUATION
Department of Anesthesiology  Postprocedure Note    Patient: Jayme Ritchie  MRN: [id number]  YOB: 1980  Date of evaluation: 1/23/2025    Procedure Summary       Date: 01/23/25 Room / Location: [facility] MAIN OR  / [facility] MAIN OR    Anesthesia Start: 1502 Anesthesia Stop: 1643    Procedure: OPEN REDUCTION INTERNAL FIXATION LATERAL MALLEOLUS FRACTURE WITH SYNDESMOSIS FIXATION (POPLITEAL AND SAPHENOUS BLOCK WITH CHOICE) (Right: Ankle) Diagnosis:       Closed displaced fracture of lateral malleolus of right fibula with routine healing      (Closed displaced fracture of lateral malleolus of right fibula with routine healing [S82.61XD])    Surgeons: Nicolas Garcia MD Responsible Provider: Colton Drew MD    Anesthesia Type: General, Regional ASA Status: 2            Anesthesia Type: General, Regional    Nasra Phase I: Nasra Score: 10    Nasra Phase II:      Anesthesia Post Evaluation    Patient location during evaluation: PACU  Patient participation: complete - patient participated  Level of consciousness: awake  Pain score: 0  Airway patency: patent  Nausea & Vomiting: no nausea and no vomiting  Cardiovascular status: blood pressure returned to baseline  Respiratory status: acceptable  Hydration status: euvolemic  Pain management: adequate    No notable events documented.

## 2025-01-23 NOTE — H&P
The patient's History and Physical within 30 days of today's date  was reviewed with the patient and I examined the patient. There was no change. The surgical site was confirmed by the patient and me.       Plan: The risks, benefits, expected outcome, and alternative to the recommended procedure have been discussed with the patient. Patient understands and wants to proceed with the procedure. Full paper H&P on the chart    Electronically signed by Nicolas Garcia MD on 1/23/2025 at 1:02 PM

## 2025-01-23 NOTE — BRIEF OP NOTE
Brief Postoperative Note      Patient: Jayme Ritchie  YOB: 1980  MRN: [id number]    Date of Procedure: 1/23/2025    Pre-op Diagnosis:  Closed RIGHT lateral malleolus fracture    Post-Op Diagnosis:   Closed RIGHT lateral malleolus fracture  RIGHT syndesmosis disruption       Procedure:  OPEN REDUCTION INTERNAL FIXATION RIGHT LATERAL MALLEOLUS  OPEN REDUCTION INTERNAL FIXATION RIGHT SYNDESMOSIS  INDEPENDENT INTERPRETATION OF INTRA-OPERATIVE FLUOROSCOPY      Surgeon(s):  Nicolas Garcia MD    Assistant:  Physician Assistant: Jean-Pierre Swain PA-C    During the procedure Jean-Pierre Swain PA-C performed the duties of positioning, retraction, assistance with limb implant management, closure and dressing application      Anesthesia: regional w/ general    Estimated Blood Loss (mL): less than 5cc     Complications: None    Specimens:   * No specimens in log *    Implants:  Implant Name Type Inv. Item Serial No.  Lot No. LRB No. Used Action   SYSTEM IMPL TI UHMWPE KNOTLESS FOR SYNDESMOSIS FIX - SNA  SYSTEM IMPL TI UHMWPE KNOTLESS FOR SYNDESMOSIS FIX NA [employer] [id number] Right 1 Implanted   SYSTEM IMPL TI UHMWPE KNOTLESS FOR SYNDESMOSIS FIX - SNA  SYSTEM IMPL TI UHMWPE KNOTLESS FOR SYNDESMOSIS FIX NA [employer] [id number] Right 1 Implanted       Steven lateral plate/screws  Arthrex tight rope x 2        Drains: * No LDAs found *    Findings:  Infection Present At Time Of Surgery (PATOS) (choose all levels that have infection present):  No infection present  Other Findings: RIGHT Bob C lateral malleolus fx with syndesmosis disruption    TT: Thigh 300mmHg x 35 min    Electronically signed by Nicolas Garcia MD on 1/23/2025 at 4:04 PM

## 2025-01-23 NOTE — ANESTHESIA PROCEDURE NOTES
Peripheral Block    Patient location during procedure: pre-op  Reason for block: procedure for pain, post-op pain management, primary anesthetic and at surgeon's request  Start time: 1/23/2025 2:14 PM  End time: 1/23/2025 2:24 PM  Staffing  Performed: anesthesiologist   Anesthesiologist: Raman Weaver MD  Performed by: Raman Weaver MD  Authorized by: Raman Weaver MD    Preanesthetic Checklist  Completed: patient identified, IV checked, site marked, risks and benefits discussed, surgical/procedural consents, timeout performed, anesthesia consent given, oxygen available and monitors applied/VS acknowledged  Peripheral Block   Patient position: supine  Prep: ChloraPrep  Provider prep: mask and sterile gloves  Patient monitoring: cardiac monitor, continuous pulse ox, continuous capnometry, frequent blood pressure checks, IV access, oxygen and responsive to questions  Block type: Sciatic and Femoral  Laterality: right  Injection technique: single-shot  Guidance: ultrasound guided    Needle   Needle type: Other   Needle gauge: 21 G  Needle localization: ultrasound guidance  Needle length: 10 cmOther needle type: STIMUPLEX  Assessment   Injection assessment: negative aspiration for heme, no paresthesia on injection, local visualized surrounding nerve on ultrasound and no intravascular symptoms  Hemodynamics: stable  Outcomes: patient tolerated procedure well    Additional Notes  Saphenous block performed with ultrasound guidance; 4\" stimuplex 21g needle used, 10cc 0.5% ropivacaine injected slowly with intermittent aspiration.    Natalia PETERSON witnessed timeout and block written on correct side.   Medications Administered  BUPivacaine liposome (EXPAREL) injection 1.3% - Perineural   10 mL - 1/23/2025 2:19:00 PM

## 2025-01-24 NOTE — OP NOTE
[facility]          [address] [place]  [address]                            OPERATIVE REPORT      PATIENT NAME: ANA LYNCH           : 1980  MED REC NO: [id number]                       ROOM: OR  ACCOUNT NO: [id number]                       ADMIT DATE: 2025  PROVIDER: Nicolas Garcia MD    DATE OF SERVICE:  2025    PREOPERATIVE DIAGNOSES:  Closed right lateral malleolus fracture.    POSTOPERATIVE DIAGNOSES:       1. Closed right lateral malleolus fracture.       2. Right syndesmosis disruption.    PROCEDURES PERFORMED:       1. Open reduction, internal fixation, right lateral malleolus fracture.     2. Open reduction, internal fixation, right syndesmosis.     3. Independent interpretation of intraoperative fluoroscopy.    SURGEON:  Nicolas Garcia MD    ASSISTANT:  Jean-Pierre Swain PA-C.    ANESTHESIA:  Regional with general.    ESTIMATED BLOOD LOSS:  Less than 5 mL.    SPECIMENS REMOVED:  None.    INTRAOPERATIVE FINDINGS:  Right Bob C lateral malleolus fracture with syndesmosis disruption.     COMPLICATIONS:  None.    IMPLANTS:  Ajo lateral plate and screws, Ar[employer]rex TightRope x2.    INDICATIONS:  This is a 44-year-old female who had a Bob C lateral malleolus fracture and shortening of the fibular on injury x-rays.  This is an unstable injury given the shortening of the fracture and has a high association with the syndesmosis injury.  I offered both conservative and surgical management options to her.  I recommended surgical fixation to promote healing, restore anatomy and function, and allow for early weightbearing and motion, and also to potentially prevent nonunion, malunion, and posttraumatic osteoarthritis of the ankle.    I discussed the risks of surgery with the patient, which include, but not limited to complications of anesthesia including death, pain, bleeding, infection, damage to surrounding structures, nonunion,

## 2025-04-07 ENCOUNTER — OFFICE VISIT (OUTPATIENT)
Age: 45
End: 2025-04-07
Payer: MEDICAID

## 2025-04-07 ENCOUNTER — HOSPITAL ENCOUNTER (OUTPATIENT)
Facility: HOSPITAL | Age: 45
Discharge: HOME | End: 2025-04-10
Attending: OBSTETRICS & GYNECOLOGY
Payer: MEDICAID

## 2025-04-07 ENCOUNTER — RESULTS FOLLOW-UP (OUTPATIENT)
Age: 45
End: 2025-04-07

## 2025-04-07 VITALS
BODY MASS INDEX: 33.38 KG/M2 | HEIGHT: 62 IN | OXYGEN SATURATION: 97 % | WEIGHT: 181.4 LBS | HEART RATE: 88 BPM | SYSTOLIC BLOOD PRESSURE: 115 MMHG | DIASTOLIC BLOOD PRESSURE: 74 MMHG | TEMPERATURE: 98.1 F | RESPIRATION RATE: 16 BRPM

## 2025-04-07 VITALS — BODY MASS INDEX: 33.31 KG/M2 | HEIGHT: 62 IN | WEIGHT: 181 LBS

## 2025-04-07 DIAGNOSIS — Z01.419 WELL WOMAN EXAM WITH ROUTINE GYNECOLOGICAL EXAM: Primary | ICD-10-CM

## 2025-04-07 DIAGNOSIS — Z31.9 PATIENT DESIRES PREGNANCY: ICD-10-CM

## 2025-04-07 DIAGNOSIS — D25.1 FIBROIDS, INTRAMURAL: ICD-10-CM

## 2025-04-07 DIAGNOSIS — Z12.31 OTHER SCREENING MAMMOGRAM: ICD-10-CM

## 2025-04-07 DIAGNOSIS — Z20.2 STD EXPOSURE: ICD-10-CM

## 2025-04-07 PROCEDURE — 77063 BREAST TOMOSYNTHESIS BI: CPT

## 2025-04-07 PROCEDURE — 99396 PREV VISIT EST AGE 40-64: CPT | Performed by: OBSTETRICS & GYNECOLOGY

## 2025-04-07 RX ORDER — ONDANSETRON 4 MG/1
4 TABLET, ORALLY DISINTEGRATING ORAL EVERY 8 HOURS PRN
COMMUNITY

## 2025-04-07 SDOH — ECONOMIC STABILITY: FOOD INSECURITY: WITHIN THE PAST 12 MONTHS, YOU WORRIED THAT YOUR FOOD WOULD RUN OUT BEFORE YOU GOT MONEY TO BUY MORE.: NEVER TRUE

## 2025-04-07 SDOH — ECONOMIC STABILITY: FOOD INSECURITY: WITHIN THE PAST 12 MONTHS, THE FOOD YOU BOUGHT JUST DIDN'T LAST AND YOU DIDN'T HAVE MONEY TO GET MORE.: NEVER TRUE

## 2025-04-07 ASSESSMENT — PATIENT HEALTH QUESTIONNAIRE - PHQ9
SUM OF ALL RESPONSES TO PHQ QUESTIONS 1-9: 2
2. FEELING DOWN, DEPRESSED OR HOPELESS: SEVERAL DAYS
1. LITTLE INTEREST OR PLEASURE IN DOING THINGS: SEVERAL DAYS
SUM OF ALL RESPONSES TO PHQ QUESTIONS 1-9: 2

## 2025-04-07 NOTE — PROGRESS NOTES
Jayme Ritchie is a 45 y.o. female returns for an annual exam     Chief Complaint   Patient presents with    Annual Exam       Patient's last menstrual period was 03/03/2025 (exact date).  Her periods are moderate in flow and often irregular with no apparent pattern.  She does not have dysmenorrhea.  Problems: Patient states having irregular cycles.    Birth Control: none.  Last Pap: normal obtained 12/18/2023.  She does not have a history of YUSRA 2, 3 or cervical cancer.   Last Mammogram:  mammogram today in the hospital .   Last Bone Density:  not obtained.  Last colonoscopy:  not obtained.      1. Have you been to the ER, urgent care clinic, or hospitalized since your last visit? Yes    2. Have you seen or consulted any other health care providers outside of the [facility] since your last visit? Yes    Examination chaperoned by MAKENNA GARCIA CMA.  
muscle loss in the affected leg.  - Appointment with orthopedic specialist tomorrow to evaluate the need for continued use of the boot.    Follow-up  - Appointment with orthopedic specialist tomorrow.       Plan:  Counseled re: diet, exercise, healthy lifestyle  Rec annual mammogram    Return in about 1 year (around 4/7/2026) for Annual.    Data Unavailable

## 2025-04-08 LAB
HCV AB SER IA-ACNC: 0.07 INDEX
HCV AB SERPL QL IA: NONREACTIVE
HIV 1+2 AB+HIV1 P24 AG SERPL QL IA: NONREACTIVE
HIV 1/2 RESULT COMMENT: NORMAL

## 2025-04-09 LAB — T PALLIDUM AB SER QL IA: NON REACTIVE

## 2025-04-10 ENCOUNTER — RESULTS FOLLOW-UP (OUTPATIENT)
Age: 45
End: 2025-04-10

## 2025-04-12 ENCOUNTER — RESULTS FOLLOW-UP (OUTPATIENT)
Age: 45
End: 2025-04-12

## (undated) DEVICE — GLOVE SURG SZ 85 L12IN FNGR THK79MIL GRN LTX FREE

## (undated) DEVICE — GLOVE ORTHO 8   MSG9480

## (undated) DEVICE — BANDAGE COMPR W6INXL12FT SMOOTH FOR LIMB EXSANG ESMARCH

## (undated) DEVICE — SPEEDGUIDE DRILL AO: Brand: VARIAX

## (undated) DEVICE — GLOVE ORANGE PI 7 1/2   MSG9075

## (undated) DEVICE — DRILL BIT, AO DIA2.6MM X 135MM, SCALED: Brand: VARIAX

## (undated) DEVICE — PADDING CAST W6INXL4YD ST COT RAYON MICROPLEATED HIGHLY

## (undated) DEVICE — BANDAGE COMPR W6INXL10YD ST M E WHITE/BEIGE

## (undated) DEVICE — UNDERPAD HOSP W30XL36IN WHT SUP ABSRB POLYMER AIR PERM DISP

## (undated) DEVICE — ZIMMER® STERILE DISPOSABLE TOURNIQUET CUFF WITH PROTECTIVE SLEEVE AND PLC, DUAL PORT, SINGLE BLADDER, 34 IN. (86 CM)

## (undated) DEVICE — KIT ARMOR C DRP COLLAPSIBLE AND SELF EXP TOP CVR FOR FLUOROSCOPIC

## (undated) DEVICE — OVERDRILL AO, DIA3.5MM X 122MM: Brand: VARIAX

## (undated) DEVICE — STRAP,POSITIONING,KNEE/BODY,FOAM,4X60": Brand: MEDLINE

## (undated) DEVICE — SUTURE NONABSORBABLE MONOFILAMENT 3-0 PS-1 18 IN BLK ETHILON 1663H

## (undated) DEVICE — BONE SCREW
Type: IMPLANTABLE DEVICE | Site: ANKLE | Status: NON-FUNCTIONAL
Brand: VARIAX
Removed: 2025-01-23

## (undated) DEVICE — SUTURE MONOCRYL SZ 3-0 L27IN ABSRB UD L19MM PS-2 3/8 CIR PRIM Y427H

## (undated) DEVICE — SOLUTION SCRB 4% CHG RED ANTIMIC SKIN CLN PREOPERATIVE DISP

## (undated) DEVICE — SPONGE GZ W4XL4IN COT 12 PLY TYP VII WVN C FLD DSGN STERILE

## (undated) DEVICE — TAPE,CLOTH/SILK,CURAD,3"X10YD,LF,40/CS: Brand: CURAD

## (undated) DEVICE — SPLINT ORTH W5XL30IN PLSTR OF PARIS LO EXOTHERM SMOOTH

## (undated) DEVICE — SUTURE VICRYL SZ 0 L36IN ABSRB UD L36MM CT-1 1/2 CIR J946H

## (undated) DEVICE — SUTURE MONOCRYL SZ 3-0 L27IN ABSRB UD PS-2 3/8 CIR REV CUT NDL MCP427H

## (undated) DEVICE — EXTREMITY-SFMCASU: Brand: MEDLINE INDUSTRIES, INC.

## (undated) DEVICE — CANISTER, RIGID, 3000CC: Brand: MEDLINE INDUSTRIES, INC.

## (undated) DEVICE — PAD,ABDOMINAL,5"X9",ST,LF,25/BX: Brand: MEDLINE INDUSTRIES, INC.

## (undated) DEVICE — DRESSING,GAUZE,XEROFORM,CURAD,5"X9",ST: Brand: CURAD

## (undated) DEVICE — SOLUTION IRRIG 1000ML STRL H2O USP PLAS POUR BTL